# Patient Record
Sex: FEMALE | Race: WHITE | NOT HISPANIC OR LATINO | Employment: OTHER | URBAN - METROPOLITAN AREA
[De-identification: names, ages, dates, MRNs, and addresses within clinical notes are randomized per-mention and may not be internally consistent; named-entity substitution may affect disease eponyms.]

---

## 2023-03-23 ENCOUNTER — NURSING HOME VISIT (OUTPATIENT)
Dept: POST ACUTE CARE | Facility: EXTERNAL LOCATION | Age: 78
End: 2023-03-23
Payer: MEDICARE

## 2023-03-23 DIAGNOSIS — K51.919 ULCERATIVE COLITIS WITH COMPLICATION, UNSPECIFIED LOCATION (MULTI): ICD-10-CM

## 2023-03-23 DIAGNOSIS — J44.9 CHRONIC OBSTRUCTIVE PULMONARY DISEASE, UNSPECIFIED COPD TYPE (MULTI): ICD-10-CM

## 2023-03-23 DIAGNOSIS — I48.91 ATRIAL FIBRILLATION, UNSPECIFIED TYPE (MULTI): Primary | ICD-10-CM

## 2023-03-23 DIAGNOSIS — I63.9 CEREBROVASCULAR ACCIDENT (CVA), UNSPECIFIED MECHANISM (MULTI): ICD-10-CM

## 2023-03-23 PROCEDURE — 99348 HOME/RES VST EST LOW MDM 30: CPT | Performed by: EMERGENCY MEDICINE

## 2023-03-23 NOTE — LETTER
Patient: Janice Ko  : 1945    Encounter Date: 2023    Provider Impression    77-year-old     [facility]    CVA-minimal deficits. On aspirin and Plavix    Ulcerative colitis-on a steroid taper now    Hypertension-controlled with 50 mg of losartan and 5 mg of amlodipine    GERD-continue Nexium at 40 mg    Anemia she is on oral iron. Her hemoglobin has recently come up    Insomnia-continue melatonin 5 mg    Patient will follow-up with her specialist at [facility]    Provide safe environment for the patient  Continue current medication regimen  Fall prevention  OT PT and speech therapy  Monitor and treat blood pressure  Skin care and aggressive decubitus ulcer prevention.  Bowel and bladder care  Optimal nutrition and supplementation  Monitor and treat blood glucose  GI and DVT prophylaxis  Symptom control with as needed medications  Periodic lab work  Will follow       History of Present Illness    77-year-old    [facility]      Patient is unable to give detailed history and therefore history is obtained from the chart    History from hospitalization-      Patient is unable to give detailed history and therefore history is obtained from the chart    No acute complaints or concerns raised by nursing    Patient had a stroke. Decline tPA. She was admitted to [facility] neurological consultation was obtained. Patient was treated with aspirin Plavix eventually stabilized and transferred to rehab.  Patient had some blood in the stool and was eventually seen by GI and was given a high-dose prednisone taper which helped her symptoms  She was discharged to [facility] on a steroid taper    Patient  Patient is a able to give some details but has complicated past history history taken from chart includes   venous thromboembolism is   hyperlipidemia   primary hypertension   cerebral infarction recently about 2 months ago for which he was treated at [facility] and was in  rehab  COPD and   ulcerative colitis      Review of Systems  Comprehensive review of symptoms was not positive for any symptoms other than HPI       Active Problems  Problems    · Acute bacterial sinusitis (461.9) (J01.90,B96.89)   · Acute deep vein thrombosis (DVT) of calf muscle vein of left lower extremity (453.42)  (I82.462)   · A-fib (427.31) (I48.91)   · Anemia (285.9) (D64.9)   · Benign essential hypertension (401.1) (I10)   · Cat bite, initial encounter (879.8,E906.3) (W55.01XA)   · Chest pain, atypical (786.59) (R07.89)   · Chronic obstructive pulmonary disease, unspecified COPD type (496) (J44.9)   · Colitis (558.9) (K52.9)   · COVID-19 (079.89) (U07.1)   · Cystocele with rectocele (618.01,618.04) (N81.10,N81.6)   · Hematuria (599.70) (R31.9)   · Hyperlipidemia, unspecified hyperlipidemia type (272.4) (E78.5)   · Mitral regurgitation (424.0) (I34.0)   · Palpitations (785.1) (R00.2)   · Pessary maintenance (V53.99) (Z46.89)   · Right wrist pain (719.43) (M25.531)   · Seasonal allergic rhinitis (477.9) (J30.2)   · Situational anxiety (300.09) (F41.8)   · Throat pain (784.1) (R07.0)   · Tick bite (919.4,E906.4) (W57.XXXA)   · Ulcerative colitis (556.9) (K51.90)   · Urinary frequency (788.41) (R35.0)   · Vaginal bleeding (623.8) (N93.9)   · Venous insufficiency, peripheral (459.81) (I87.2)    Past Medical History  Problems    · History of chest pain (V13.89) (Z87.898)   · History of mitral valve prolapse (V12.59) (Z86.79)   · History of Varicose veins of lower extremities with inflammation (454.1) (I83.10)    Surgical History  Problems    · History of Biopsy Breast Open   · History of Cataract Surgery    Family History  Mother    · Family history of congestive heart failure (V17.49) (Z82.49)   · Family history of hypertension (V17.49) (Z82.49)   · Family history of malignant neoplasm of breast (V16.3) (Z80.3)  Father    · Family history of hypertension (V17.49) (Z82.49)   · Family history of myocardial  infarction (V17.3) (Z82.49)   · Family history of stroke-in-evolution syndrome (V17.1) (Z82.3)  Brother    · Family history of hypertension (V17.49) (Z82.49)    Social History  Problems    · Caffeine use (V49.89) (Z78.9)   · Former smoker (V15.82) (Z87.891)   · Used from 7231-7091 1/4 per day   · Occasional alcohol use   · Glass of wine or larger beer occassionally; apporx 4 per week    Allergies  Medication    · TETANUS   Allergy; Swelling; Updated By: Araseli Shin; 7/9/2018 2:45:51 PM   · EPINEPHrine Base AERS   Recorded By: Roselia Lopez; 4/28/2017 11:19:49 AM    Physical Exam    Vital signs as per nursing/MA documentation were reviewd  General appearance: Alert and in no acute distress  HEENT: Normal Inspection  Neck - Normal Inspectiopn  Respiratory : No respiratory distress.   Lungs have reduced breath sounds   Cardiovascular: S1 S2 No gallop  Back - normal inspection  Skin inspection:Warm  Musculoskeletal : No deformities  Neuro : Limited exam. Baseline  Psychiatric : Cooperative      Electronically Signed By: Carlo Oh MD   4/13/23  1:42 PM

## 2023-04-03 PROBLEM — J30.2 SEASONAL ALLERGIC RHINITIS: Status: ACTIVE | Noted: 2023-04-03

## 2023-04-03 PROBLEM — N81.6 CYSTOCELE WITH RECTOCELE: Status: ACTIVE | Noted: 2023-04-03

## 2023-04-03 PROBLEM — I48.91 A-FIB (MULTI): Status: ACTIVE | Noted: 2023-04-03

## 2023-04-03 PROBLEM — R07.89 CHEST PAIN, ATYPICAL: Status: ACTIVE | Noted: 2023-04-03

## 2023-04-03 PROBLEM — R35.0 URINARY FREQUENCY: Status: ACTIVE | Noted: 2023-04-03
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
## 2023-04-03 PROBLEM — D64.9 ANEMIA: Status: ACTIVE | Noted: 2023-04-03

## 2023-04-03 PROBLEM — I10 BENIGN ESSENTIAL HYPERTENSION: Status: ACTIVE | Noted: 2023-04-03

## 2023-04-03 PROBLEM — I82.462 ACUTE DEEP VEIN THROMBOSIS (DVT) OF CALF MUSCLE VEIN OF LEFT LOWER EXTREMITY (MULTI): Status: ACTIVE | Noted: 2023-04-03

## 2023-04-03 PROBLEM — U07.1 COVID-19: Status: ACTIVE | Noted: 2023-04-03

## 2023-04-03 PROBLEM — K52.9 COLITIS: Status: ACTIVE | Noted: 2023-04-03

## 2023-04-03 PROBLEM — K51.90 ULCERATIVE COLITIS (MULTI): Status: ACTIVE | Noted: 2023-04-03

## 2023-04-03 PROBLEM — N89.8 DISCHARGE OF VAGINA: Status: ACTIVE | Noted: 2023-04-03

## 2023-04-03 PROBLEM — B96.89 ACUTE BACTERIAL SINUSITIS: Status: ACTIVE | Noted: 2023-04-03

## 2023-04-03 PROBLEM — R00.2 PALPITATIONS: Status: ACTIVE | Noted: 2023-04-03

## 2023-04-03 PROBLEM — M25.531 RIGHT WRIST PAIN: Status: ACTIVE | Noted: 2023-04-03
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
## 2023-04-03 PROBLEM — N93.9 VAGINAL BLEEDING: Status: ACTIVE | Noted: 2023-04-03

## 2023-04-03 PROBLEM — J01.90 ACUTE BACTERIAL SINUSITIS: Status: ACTIVE | Noted: 2023-04-03

## 2023-04-03 PROBLEM — R07.0 THROAT PAIN: Status: ACTIVE | Noted: 2023-04-03

## 2023-04-03 PROBLEM — I63.9 CEREBROVASCULAR ACCIDENT (MULTI): Status: ACTIVE | Noted: 2023-04-03

## 2023-04-03 PROBLEM — J44.9 CHRONIC OBSTRUCTIVE PULMONARY DISEASE (MULTI): Status: ACTIVE | Noted: 2023-04-03

## 2023-04-03 PROBLEM — F41.8 SITUATIONAL ANXIETY: Status: ACTIVE | Noted: 2023-04-03

## 2023-04-03 PROBLEM — N81.10 CYSTOCELE WITH RECTOCELE: Status: ACTIVE | Noted: 2023-04-03

## 2023-04-03 PROBLEM — R31.9 HEMATURIA: Status: ACTIVE | Noted: 2023-04-03

## 2023-04-03 PROBLEM — I34.0 MITRAL REGURGITATION: Status: ACTIVE | Noted: 2023-04-03

## 2023-04-03 PROBLEM — E78.5 HYPERLIPIDEMIA: Status: ACTIVE | Noted: 2023-04-03

## 2023-04-03 PROBLEM — I87.2 VENOUS INSUFFICIENCY, PERIPHERAL: Status: ACTIVE | Noted: 2023-04-03

## 2023-04-03 RX ORDER — MESALAMINE 500 MG/1
1000 CAPSULE, EXTENDED RELEASE ORAL 4 TIMES DAILY
COMMUNITY
Start: 2023-03-24 | End: 2024-01-16 | Stop reason: WASHOUT

## 2023-04-03 RX ORDER — CLOPIDOGREL BISULFATE 75 MG/1
TABLET ORAL
COMMUNITY
Start: 2022-12-30 | End: 2023-04-13 | Stop reason: ALTCHOICE

## 2023-04-03 RX ORDER — AMLODIPINE BESYLATE 2.5 MG/1
1 TABLET ORAL DAILY
COMMUNITY
Start: 2020-01-23 | End: 2023-04-13 | Stop reason: SDUPTHER

## 2023-04-03 RX ORDER — ASCORBIC ACID 500 MG
1 TABLET ORAL DAILY
COMMUNITY
End: 2024-01-16 | Stop reason: WASHOUT

## 2023-04-03 RX ORDER — MULTIVITAMIN
TABLET ORAL
COMMUNITY
Start: 2023-03-02 | End: 2023-10-16 | Stop reason: SDUPTHER

## 2023-04-03 RX ORDER — CHOLECALCIFEROL (VITAMIN D3) 25 MCG
1 TABLET ORAL DAILY
COMMUNITY
End: 2023-10-16 | Stop reason: SDUPTHER

## 2023-04-03 RX ORDER — MULTIVIT-MIN/IRON FUM/FOLIC AC 7.5 MG-4
1 TABLET ORAL DAILY
COMMUNITY
End: 2023-10-16 | Stop reason: SDUPTHER

## 2023-04-03 RX ORDER — ESOMEPRAZOLE STRONTIUM 49.3 MG/1
CAPSULE, DELAYED RELEASE ORAL 2 TIMES DAILY
COMMUNITY
End: 2023-04-13 | Stop reason: ALTCHOICE

## 2023-04-03 RX ORDER — AMLODIPINE BESYLATE 5 MG/1
TABLET ORAL
COMMUNITY
Start: 2023-03-30 | End: 2023-04-13 | Stop reason: SDUPTHER

## 2023-04-03 RX ORDER — ZINC SULFATE 50(220)MG
CAPSULE ORAL
COMMUNITY
Start: 2023-03-15 | End: 2023-10-16 | Stop reason: SDUPTHER

## 2023-04-03 RX ORDER — ACETAMINOPHEN 500 MG
1 TABLET ORAL NIGHTLY
COMMUNITY
End: 2024-01-16 | Stop reason: WASHOUT

## 2023-04-03 RX ORDER — OMEPRAZOLE 20 MG/1
CAPSULE, DELAYED RELEASE ORAL
COMMUNITY
Start: 2023-03-30 | End: 2023-04-13 | Stop reason: WASHOUT

## 2023-04-03 RX ORDER — NICOTINE POLACRILEX 4 MG
1 GUM BUCCAL DAILY
COMMUNITY
End: 2024-01-16 | Stop reason: WASHOUT

## 2023-04-03 RX ORDER — FERROUS SULFATE 325(65) MG
1 TABLET ORAL EVERY OTHER DAY
COMMUNITY
End: 2024-01-16 | Stop reason: WASHOUT

## 2023-04-03 RX ORDER — LOSARTAN POTASSIUM 50 MG/1
TABLET ORAL
COMMUNITY
End: 2024-04-23 | Stop reason: SDUPTHER

## 2023-04-03 RX ORDER — ASPIRIN 81 MG/1
1 TABLET ORAL DAILY
COMMUNITY
Start: 2023-01-19 | End: 2024-02-27 | Stop reason: HOSPADM

## 2023-04-03 RX ORDER — ATORVASTATIN CALCIUM 40 MG/1
1 TABLET, FILM COATED ORAL DAILY
COMMUNITY
Start: 2022-12-30 | End: 2023-04-13 | Stop reason: ALTCHOICE

## 2023-04-03 RX ORDER — CHOLECALCIFEROL (VITAMIN D3) 50 MCG
1 TABLET ORAL DAILY
COMMUNITY
End: 2023-10-16 | Stop reason: SDUPTHER

## 2023-04-03 RX ORDER — L. ACIDOPHILUS/L.BULGARICUS 100MM CELL
GRANULES IN PACKET (EA) ORAL
COMMUNITY
End: 2024-01-16 | Stop reason: WASHOUT

## 2023-04-03 RX ORDER — CALCIUM CARBONATE/VITAMIN D3 600MG-5MCG
1 TABLET ORAL 3 TIMES DAILY
COMMUNITY
End: 2023-10-16 | Stop reason: SDUPTHER

## 2023-04-03 RX ORDER — NAPROXEN SODIUM 220 MG/1
1 TABLET, FILM COATED ORAL DAILY
COMMUNITY
Start: 2022-12-30 | End: 2023-04-13 | Stop reason: SDUPTHER

## 2023-04-03 RX ORDER — MESALAMINE 0.38 G/1
CAPSULE, EXTENDED RELEASE ORAL
COMMUNITY
Start: 2023-03-16 | End: 2023-10-16 | Stop reason: SDUPTHER

## 2023-04-03 RX ORDER — LOSARTAN POTASSIUM 25 MG/1
TABLET ORAL
COMMUNITY
End: 2023-04-13 | Stop reason: SDUPTHER

## 2023-04-03 RX ORDER — AMLODIPINE BESYLATE 10 MG/1
0.5 TABLET ORAL DAILY
COMMUNITY
End: 2024-01-16 | Stop reason: WASHOUT

## 2023-04-03 RX ORDER — FAMOTIDINE 20 MG/1
TABLET, FILM COATED ORAL
COMMUNITY
Start: 2023-03-16 | End: 2023-07-13 | Stop reason: ALTCHOICE

## 2023-04-03 RX ORDER — EPINEPHRINE 0.22MG
AEROSOL WITH ADAPTER (ML) INHALATION
COMMUNITY
End: 2024-01-16 | Stop reason: WASHOUT

## 2023-04-03 RX ORDER — ESOMEPRAZOLE MAGNESIUM 40 MG/1
1 CAPSULE, DELAYED RELEASE ORAL DAILY PRN
COMMUNITY
End: 2023-04-13 | Stop reason: ALTCHOICE

## 2023-04-03 RX ORDER — ACETAMINOPHEN 160 MG/5ML
SUSPENSION, ORAL (FINAL DOSE FORM) ORAL
COMMUNITY
End: 2023-10-16 | Stop reason: SDUPTHER

## 2023-04-03 RX ORDER — ACETAMINOPHEN 500 MG
1 TABLET ORAL DAILY
COMMUNITY
End: 2023-10-16 | Stop reason: SDUPTHER

## 2023-04-13 ENCOUNTER — OFFICE VISIT (OUTPATIENT)
Dept: PRIMARY CARE | Facility: CLINIC | Age: 78
End: 2023-04-13
Payer: MEDICARE

## 2023-04-13 VITALS
DIASTOLIC BLOOD PRESSURE: 62 MMHG | BODY MASS INDEX: 19.08 KG/M2 | SYSTOLIC BLOOD PRESSURE: 126 MMHG | HEART RATE: 97 BPM | HEIGHT: 60 IN | TEMPERATURE: 97.4 F | OXYGEN SATURATION: 97 % | WEIGHT: 97.2 LBS

## 2023-04-13 DIAGNOSIS — I10 BENIGN ESSENTIAL HYPERTENSION: ICD-10-CM

## 2023-04-13 DIAGNOSIS — I63.139 CEREBROVASCULAR ACCIDENT (CVA) DUE TO EMBOLISM OF CAROTID ARTERY, UNSPECIFIED BLOOD VESSEL LATERALITY (MULTI): Primary | ICD-10-CM

## 2023-04-13 DIAGNOSIS — K51.919 ULCERATIVE COLITIS WITH COMPLICATION, UNSPECIFIED LOCATION (MULTI): ICD-10-CM

## 2023-04-13 DIAGNOSIS — G81.94 LEFT HEMIPARESIS (MULTI): ICD-10-CM

## 2023-04-13 DIAGNOSIS — E78.5 HYPERLIPIDEMIA, UNSPECIFIED HYPERLIPIDEMIA TYPE: ICD-10-CM

## 2023-04-13 PROBLEM — I48.91 A-FIB (MULTI): Status: RESOLVED | Noted: 2023-04-03 | Resolved: 2023-04-13

## 2023-04-13 PROCEDURE — 1159F MED LIST DOCD IN RCRD: CPT | Performed by: FAMILY MEDICINE

## 2023-04-13 PROCEDURE — 3074F SYST BP LT 130 MM HG: CPT | Performed by: FAMILY MEDICINE

## 2023-04-13 PROCEDURE — 1036F TOBACCO NON-USER: CPT | Performed by: FAMILY MEDICINE

## 2023-04-13 PROCEDURE — 99214 OFFICE O/P EST MOD 30 MIN: CPT | Performed by: FAMILY MEDICINE

## 2023-04-13 PROCEDURE — 3078F DIAST BP <80 MM HG: CPT | Performed by: FAMILY MEDICINE

## 2023-04-13 RX ORDER — VALSARTAN AND HYDROCHLOROTHIAZIDE 160; 12.5 MG/1; MG/1
TABLET, FILM COATED ORAL
COMMUNITY
Start: 2014-04-29 | End: 2023-04-13 | Stop reason: ALTCHOICE

## 2023-04-13 RX ORDER — CHOLECALCIFEROL (VITAMIN D3) 25 MCG
1000 TABLET ORAL
COMMUNITY
Start: 2023-02-17 | End: 2024-01-16 | Stop reason: WASHOUT

## 2023-04-13 RX ORDER — ASCORBIC ACID 500 MG
500 TABLET,CHEWABLE ORAL
COMMUNITY
Start: 2023-02-17 | End: 2023-10-16 | Stop reason: SDUPTHER

## 2023-04-13 RX ORDER — ROSUVASTATIN CALCIUM 20 MG/1
1 TABLET, COATED ORAL DAILY
COMMUNITY
Start: 2023-04-10 | End: 2024-01-16 | Stop reason: WASHOUT

## 2023-04-13 SDOH — ECONOMIC STABILITY: INCOME INSECURITY: IN THE LAST 12 MONTHS, WAS THERE A TIME WHEN YOU WERE NOT ABLE TO PAY THE MORTGAGE OR RENT ON TIME?: NO

## 2023-04-13 SDOH — ECONOMIC STABILITY: TRANSPORTATION INSECURITY
IN THE PAST 12 MONTHS, HAS LACK OF TRANSPORTATION KEPT YOU FROM MEETINGS, WORK, OR FROM GETTING THINGS NEEDED FOR DAILY LIVING?: NO

## 2023-04-13 SDOH — ECONOMIC STABILITY: FOOD INSECURITY: WITHIN THE PAST 12 MONTHS, THE FOOD YOU BOUGHT JUST DIDN'T LAST AND YOU DIDN'T HAVE MONEY TO GET MORE.: NEVER TRUE

## 2023-04-13 SDOH — ECONOMIC STABILITY: TRANSPORTATION INSECURITY
IN THE PAST 12 MONTHS, HAS THE LACK OF TRANSPORTATION KEPT YOU FROM MEDICAL APPOINTMENTS OR FROM GETTING MEDICATIONS?: NO

## 2023-04-13 SDOH — ECONOMIC STABILITY: FOOD INSECURITY: WITHIN THE PAST 12 MONTHS, YOU WORRIED THAT YOUR FOOD WOULD RUN OUT BEFORE YOU GOT MONEY TO BUY MORE.: NEVER TRUE

## 2023-04-13 SDOH — ECONOMIC STABILITY: HOUSING INSECURITY
IN THE LAST 12 MONTHS, WAS THERE A TIME WHEN YOU DID NOT HAVE A STEADY PLACE TO SLEEP OR SLEPT IN A SHELTER (INCLUDING NOW)?: NO

## 2023-04-13 ASSESSMENT — SOCIAL DETERMINANTS OF HEALTH (SDOH)
HOW HARD IS IT FOR YOU TO PAY FOR THE VERY BASICS LIKE FOOD, HOUSING, MEDICAL CARE, AND HEATING?: NOT HARD AT ALL
WITHIN THE LAST YEAR, HAVE TO BEEN RAPED OR FORCED TO HAVE ANY KIND OF SEXUAL ACTIVITY BY YOUR PARTNER OR EX-PARTNER?: NO
WITHIN THE LAST YEAR, HAVE YOU BEEN KICKED, HIT, SLAPPED, OR OTHERWISE PHYSICALLY HURT BY YOUR PARTNER OR EX-PARTNER?: NO
WITHIN THE LAST YEAR, HAVE YOU BEEN HUMILIATED OR EMOTIONALLY ABUSED IN OTHER WAYS BY YOUR PARTNER OR EX-PARTNER?: NO
WITHIN THE LAST YEAR, HAVE YOU BEEN AFRAID OF YOUR PARTNER OR EX-PARTNER?: NO

## 2023-04-13 ASSESSMENT — ENCOUNTER SYMPTOMS
PSYCHIATRIC NEGATIVE: 1
LOSS OF SENSATION IN FEET: 0
OCCASIONAL FEELINGS OF UNSTEADINESS: 0
FATIGUE: 1
DEPRESSION: 0

## 2023-04-13 ASSESSMENT — LIFESTYLE VARIABLES
AUDIT-C TOTAL SCORE: 0
HOW OFTEN DO YOU HAVE A DRINK CONTAINING ALCOHOL: NEVER
HOW OFTEN DO YOU HAVE SIX OR MORE DRINKS ON ONE OCCASION: NEVER
SKIP TO QUESTIONS 9-10: 1
HOW MANY STANDARD DRINKS CONTAINING ALCOHOL DO YOU HAVE ON A TYPICAL DAY: PATIENT DOES NOT DRINK

## 2023-04-13 ASSESSMENT — PATIENT HEALTH QUESTIONNAIRE - PHQ9
SUM OF ALL RESPONSES TO PHQ9 QUESTIONS 1 & 2: 0
2. FEELING DOWN, DEPRESSED OR HOPELESS: NOT AT ALL
1. LITTLE INTEREST OR PLEASURE IN DOING THINGS: NOT AT ALL

## 2023-04-13 ASSESSMENT — PAIN SCALES - GENERAL: PAINLEVEL: 0-NO PAIN

## 2023-04-13 NOTE — PROGRESS NOTES
Subjective   Patient ID: Janice Ko is a 77 y.o. female who presents for Follow-up ([facility]- stroke ).    HPI     Review of Systems   Constitutional:  Positive for fatigue.   Cardiovascular:         Dr Perez cardiac   Gastrointestinal:         History of UC she is getting an iron transfusion   Neurological:         CVA 12/2022 [facility] left hemiparesis   Hematological:         Anemia   Psychiatric/Behavioral: Negative.         Objective   /62 (BP Location: Left arm)   Pulse 97   Temp 36.3 °C (97.4 °F) (Temporal)   Ht 1.524 m (5')   Wt (!) 44.1 kg (97 lb 3.2 oz)   SpO2 97%   BMI 18.98 kg/m²     Physical Exam  Vitals and nursing note reviewed.   Cardiovascular:      Rate and Rhythm: Normal rate and regular rhythm.      Pulses: Normal pulses.      Heart sounds: Normal heart sounds.   Pulmonary:      Breath sounds: Normal breath sounds.   Abdominal:      Palpations: Abdomen is soft.   Neurological:      Mental Status: She is alert.      Comments: Left hemiparesis with an AFO brace   Psychiatric:         Mood and Affect: Mood normal.         Assessment/Plan   Problem List Items Addressed This Visit          Nervous    Cerebrovascular accident (CMS/HCC) - Primary       Circulatory    Benign essential hypertension       Digestive    Ulcerative colitis (CMS/HCC)       Other    Hyperlipidemia     Other Visit Diagnoses       Left hemiparesis (CMS/HCC)

## 2023-04-13 NOTE — PROGRESS NOTES
Provider Impression    77-year-old     [facility]    CVA-minimal deficits. On aspirin and Plavix    Ulcerative colitis-on a steroid taper now    Hypertension-controlled with 50 mg of losartan and 5 mg of amlodipine    GERD-continue Nexium at 40 mg    Anemia she is on oral iron. Her hemoglobin has recently come up    Insomnia-continue melatonin 5 mg    Patient will follow-up with her specialist at [facility]    Provide safe environment for the patient  Continue current medication regimen  Fall prevention  OT PT and speech therapy  Monitor and treat blood pressure  Skin care and aggressive decubitus ulcer prevention.  Bowel and bladder care  Optimal nutrition and supplementation  Monitor and treat blood glucose  GI and DVT prophylaxis  Symptom control with as needed medications  Periodic lab work  Will follow       History of Present Illness    77-year-old    [facility]      Patient is unable to give detailed history and therefore history is obtained from the chart    History from hospitalization-      Patient is unable to give detailed history and therefore history is obtained from the chart    No acute complaints or concerns raised by nursing    Patient had a stroke. Decline tPA. She was admitted to [facility] neurological consultation was obtained. Patient was treated with aspirin Plavix eventually stabilized and transferred to rehab.  Patient had some blood in the stool and was eventually seen by GI and was given a high-dose prednisone taper which helped her symptoms  She was discharged to [facility] on a steroid taper    Patient  Patient is a able to give some details but has complicated past history history taken from chart includes   venous thromboembolism is   hyperlipidemia   primary hypertension   cerebral infarction recently about 2 months ago for which he was treated at [facility] and was in rehab  COPD and   ulcerative colitis      Review of Systems  Comprehensive  review of symptoms was not positive for any symptoms other than HPI       Active Problems  Problems    · Acute bacterial sinusitis (461.9) (J01.90,B96.89)   · Acute deep vein thrombosis (DVT) of calf muscle vein of left lower extremity (453.42)  (I82.462)   · A-fib (427.31) (I48.91)   · Anemia (285.9) (D64.9)   · Benign essential hypertension (401.1) (I10)   · Cat bite, initial encounter (879.8,E906.3) (W55.01XA)   · Chest pain, atypical (786.59) (R07.89)   · Chronic obstructive pulmonary disease, unspecified COPD type (496) (J44.9)   · Colitis (558.9) (K52.9)   · COVID-19 (079.89) (U07.1)   · Cystocele with rectocele (618.01,618.04) (N81.10,N81.6)   · Hematuria (599.70) (R31.9)   · Hyperlipidemia, unspecified hyperlipidemia type (272.4) (E78.5)   · Mitral regurgitation (424.0) (I34.0)   · Palpitations (785.1) (R00.2)   · Pessary maintenance (V53.99) (Z46.89)   · Right wrist pain (719.43) (M25.531)   · Seasonal allergic rhinitis (477.9) (J30.2)   · Situational anxiety (300.09) (F41.8)   · Throat pain (784.1) (R07.0)   · Tick bite (919.4,E906.4) (W57.XXXA)   · Ulcerative colitis (556.9) (K51.90)   · Urinary frequency (788.41) (R35.0)   · Vaginal bleeding (623.8) (N93.9)   · Venous insufficiency, peripheral (459.81) (I87.2)    Past Medical History  Problems    · History of chest pain (V13.89) (Z87.898)   · History of mitral valve prolapse (V12.59) (Z86.79)   · History of Varicose veins of lower extremities with inflammation (454.1) (I83.10)    Surgical History  Problems    · History of Biopsy Breast Open   · History of Cataract Surgery    Family History  Mother    · Family history of congestive heart failure (V17.49) (Z82.49)   · Family history of hypertension (V17.49) (Z82.49)   · Family history of malignant neoplasm of breast (V16.3) (Z80.3)  Father    · Family history of hypertension (V17.49) (Z82.49)   · Family history of myocardial infarction (V17.3) (Z82.49)   · Family history of stroke-in-evolution syndrome  (V17.1) (Z82.3)  Brother    · Family history of hypertension (V17.49) (Z82.49)    Social History  Problems    · Caffeine use (V49.89) (Z78.9)   · Former smoker (V15.82) (Z87.891)   · Used from 9783-9391 1/4 per day   · Occasional alcohol use   · Glass of wine or larger beer occassionally; apporx 4 per week    Allergies  Medication    · TETANUS   Allergy; Swelling; Updated By: Araseli Shin; 7/9/2018 2:45:51 PM   · EPINEPHrine Base AERS   Recorded By: Roselia Lopez; 4/28/2017 11:19:49 AM    Physical Exam    Vital signs as per nursing/MA documentation were reviewd  General appearance: Alert and in no acute distress  HEENT: Normal Inspection  Neck - Normal Inspectiopn  Respiratory : No respiratory distress.   Lungs have reduced breath sounds   Cardiovascular: S1 S2 No gallop  Back - normal inspection  Skin inspection:Warm  Musculoskeletal : No deformities  Neuro : Limited exam. Baseline  Psychiatric : Cooperative

## 2023-04-20 ENCOUNTER — HOME VISIT (OUTPATIENT)
Dept: POST ACUTE CARE | Facility: EXTERNAL LOCATION | Age: 78
End: 2023-04-20
Payer: MEDICARE

## 2023-04-20 DIAGNOSIS — I82.462 ACUTE DEEP VEIN THROMBOSIS (DVT) OF CALF MUSCLE VEIN OF LEFT LOWER EXTREMITY (MULTI): Primary | ICD-10-CM

## 2023-04-20 DIAGNOSIS — K51.919 ULCERATIVE COLITIS WITH COMPLICATION, UNSPECIFIED LOCATION (MULTI): ICD-10-CM

## 2023-04-20 DIAGNOSIS — J44.9 CHRONIC OBSTRUCTIVE PULMONARY DISEASE, UNSPECIFIED COPD TYPE (MULTI): ICD-10-CM

## 2023-04-20 PROCEDURE — 99349 HOME/RES VST EST MOD MDM 40: CPT | Performed by: EMERGENCY MEDICINE

## 2023-04-25 NOTE — PROGRESS NOTES
Provider Impression    77-year-old     [facility]    CVA-minimal deficits. On aspirin and Plavix    Ulcerative colitis-on a steroid taper now    Hypertension-controlled with 50 mg of losartan and 5 mg of amlodipine    GERD-continue Nexium at 40 mg    Anemia she is on oral iron. Her hemoglobin has recently come up    Insomnia-continue melatonin 5 mg    Patient will follow-up with her specialist at [facility]    -Fall prevention    -Cognitive engagement     -Monitor and treat blood pressure     -Aggressive decubitus ulcer prevention.     -Bowel and bladder care     -Optimal nutrition and supplementation as needed     -GI  and DVT prophylaxis     -Symptom control     -Ambulation as tolerated     -Will follow         History of Present Illness    77-year-old    [facility]      Patient is unable to give detailed history and therefore history is obtained from the chart    History from hospitalization-      Patient is unable to give detailed history and therefore history is obtained from the chart    No acute complaints or concerns raised by nursing    Patient had a stroke. Decline tPA. She was admitted to [facility] neurological consultation was obtained. Patient was treated with aspirin Plavix eventually stabilized and transferred to rehab.  Patient had some blood in the stool and was eventually seen by GI and was given a high-dose prednisone taper which helped her symptoms  She was discharged to [facility] assisted living on a steroid taper    Patient  Patient is a able to give some details but has complicated past history history taken from chart includes   venous thromboembolism is   hyperlipidemia   primary hypertension   cerebral infarction recently about 2 months ago for which he was treated at [facility] and was in rehab  COPD and   ulcerative colitis      Review of Systems  Comprehensive review of symptoms was not positive for any symptoms other than HPI       Active Problems  Problems     · Acute bacterial sinusitis (461.9) (J01.90,B96.89)   · Acute deep vein thrombosis (DVT) of calf muscle vein of left lower extremity (453.42)  (I82.462)   · A-fib (427.31) (I48.91)   · Anemia (285.9) (D64.9)   · Benign essential hypertension (401.1) (I10)   · Cat bite, initial encounter (879.8,E906.3) (W55.01XA)   · Chest pain, atypical (786.59) (R07.89)   · Chronic obstructive pulmonary disease, unspecified COPD type (496) (J44.9)   · Colitis (558.9) (K52.9)   · COVID-19 (079.89) (U07.1)   · Cystocele with rectocele (618.01,618.04) (N81.10,N81.6)   · Hematuria (599.70) (R31.9)   · Hyperlipidemia, unspecified hyperlipidemia type (272.4) (E78.5)   · Mitral regurgitation (424.0) (I34.0)   · Palpitations (785.1) (R00.2)   · Pessary maintenance (V53.99) (Z46.89)   · Right wrist pain (719.43) (M25.531)   · Seasonal allergic rhinitis (477.9) (J30.2)   · Situational anxiety (300.09) (F41.8)   · Throat pain (784.1) (R07.0)   · Tick bite (919.4,E906.4) (W57.XXXA)   · Ulcerative colitis (556.9) (K51.90)   · Urinary frequency (788.41) (R35.0)   · Vaginal bleeding (623.8) (N93.9)   · Venous insufficiency, peripheral (459.81) (I87.2)    Past Medical History  Problems    · History of chest pain (V13.89) (Z87.898)   · History of mitral valve prolapse (V12.59) (Z86.79)   · History of Varicose veins of lower extremities with inflammation (454.1) (I83.10)    Surgical History  Problems    · History of Biopsy Breast Open   · History of Cataract Surgery    Family History  Mother    · Family history of congestive heart failure (V17.49) (Z82.49)   · Family history of hypertension (V17.49) (Z82.49)   · Family history of malignant neoplasm of breast (V16.3) (Z80.3)  Father    · Family history of hypertension (V17.49) (Z82.49)   · Family history of myocardial infarction (V17.3) (Z82.49)   · Family history of stroke-in-evolution syndrome (V17.1) (Z82.3)  Brother    · Family history of hypertension (V17.49) (Z82.49)    Social History  Problems     · Caffeine use (V49.89) (Z78.9)   · Former smoker (V15.82) (Z87.891)   · Used from 7431-6185 1/4 per day   · Occasional alcohol use   · Glass of wine or larger beer occassionally; apporx 4 per week    Allergies  Medication    · TETANUS   Allergy; Swelling; Updated By: Araseli Shin; 7/9/2018 2:45:51 PM   · EPINEPHrine Base AERS   Recorded By: Roselia Lopez; 4/28/2017 11:19:49 AM    Physical Exam    Vital signs as per nursing/MA documentation were reviewd  General appearance: Alert and in no acute distress  HEENT: Normal Inspection  Neck - Normal Inspectiopn  Respiratory : No respiratory distress.   Lungs have reduced breath sounds   Cardiovascular: S1 S2 No gallop  Back - normal inspection  Skin inspection:Warm  Musculoskeletal : No deformities  Neuro : Limited exam. Baseline  Psychiatric : Cooperative

## 2023-04-26 ENCOUNTER — TELEPHONE (OUTPATIENT)
Dept: PRIMARY CARE | Facility: CLINIC | Age: 78
End: 2023-04-26
Payer: MEDICARE

## 2023-04-26 DIAGNOSIS — K51.919 ULCERATIVE COLITIS WITH COMPLICATION, UNSPECIFIED LOCATION (MULTI): Primary | ICD-10-CM

## 2023-04-26 NOTE — TELEPHONE ENCOUNTER
Pt called and would like to know if you could order a CBC due to getting an injection done in may. Pt wants to see what her levels are. Pt states that her cardio doctor cut here crestor down and she wants to know if she is making the right decision.

## 2023-05-18 ENCOUNTER — HOME VISIT (OUTPATIENT)
Dept: POST ACUTE CARE | Facility: EXTERNAL LOCATION | Age: 78
End: 2023-05-18
Payer: MEDICARE

## 2023-05-18 DIAGNOSIS — I82.462 ACUTE DEEP VEIN THROMBOSIS (DVT) OF CALF MUSCLE VEIN OF LEFT LOWER EXTREMITY (MULTI): ICD-10-CM

## 2023-05-18 DIAGNOSIS — J44.9 CHRONIC OBSTRUCTIVE PULMONARY DISEASE, UNSPECIFIED COPD TYPE (MULTI): Primary | ICD-10-CM

## 2023-05-18 DIAGNOSIS — I63.139 CEREBROVASCULAR ACCIDENT (CVA) DUE TO EMBOLISM OF CAROTID ARTERY, UNSPECIFIED BLOOD VESSEL LATERALITY (MULTI): ICD-10-CM

## 2023-05-18 DIAGNOSIS — K51.919 ULCERATIVE COLITIS WITH COMPLICATION, UNSPECIFIED LOCATION (MULTI): ICD-10-CM

## 2023-05-18 PROCEDURE — 99349 HOME/RES VST EST MOD MDM 40: CPT | Performed by: EMERGENCY MEDICINE

## 2023-05-25 NOTE — PROGRESS NOTES
Provider Impression    77-year-old     [facility]    CVA-minimal deficits. On aspirin and Plavix    Ulcerative colitis-on a steroid taper now    Hypertension-controlled with 50 mg of losartan and 5 mg of amlodipine    GERD-continue Nexium at 40 mg    Anemia she is on oral iron. Her hemoglobin has recently come up    Insomnia-continue melatonin 5 mg    Patient will follow-up with her specialist at [facility]    Rx list reviewed.   PT and OT evaluation is in the process.   Routine safety measures, fall precautions, risk modification and alarm placement if needed for prevention of falls.   Skin care precautions, prevention of pressures sores at pressure points assessed.   Pt needs to be monitored frequently by nursing staff particularly at night time.   Any confusion, agitation or behavioural disturbance needs to be attended, as per home policy   rapid covid Ag assay need to be done, notify if positive.   If needed appropriate measures to be taken for alarm placements and assisted devices, pt was told not to get up and ambulate at night unless help and assist available at bedside,   labs will be done as per our routine protocol.   PO intake need to be monitored if consuming po.       Charting is done using voice recognition software and may contain errors which have not been completely corrected           History of Present Illness    77-year-old    [facility]      Patient is unable to give detailed history and therefore history is obtained from the chart    History from hospitalization-      Patient is unable to give detailed history and therefore history is obtained from the chart    No acute complaints or concerns raised by nursing    Patient had a stroke. Decline tPA. She was admitted to [facility] neurological consultation was obtained. Patient was treated with aspirin Plavix eventually stabilized and transferred to rehab.  Patient had some blood in the stool and was eventually  seen by GI and was given a high-dose prednisone taper which helped her symptoms  She was discharged to [facility] assisted living on a steroid taper    Patient  Patient is a able to give some details but has complicated past history history taken from chart includes   venous thromboembolism is   hyperlipidemia   primary hypertension   cerebral infarction recently about 2 months ago for which he was treated at [facility] and was in rehab  COPD and   ulcerative colitis      Review of Systems  Comprehensive review of symptoms was not positive for any symptoms other than HPI       Active Problems  Problems    · Acute bacterial sinusitis (461.9) (J01.90,B96.89)   · Acute deep vein thrombosis (DVT) of calf muscle vein of left lower extremity (453.42)  (I82.462)   · A-fib (427.31) (I48.91)   · Anemia (285.9) (D64.9)   · Benign essential hypertension (401.1) (I10)   · Cat bite, initial encounter (879.8,E906.3) (W55.01XA)   · Chest pain, atypical (786.59) (R07.89)   · Chronic obstructive pulmonary disease, unspecified COPD type (496) (J44.9)   · Colitis (558.9) (K52.9)   · COVID-19 (079.89) (U07.1)   · Cystocele with rectocele (618.01,618.04) (N81.10,N81.6)   · Hematuria (599.70) (R31.9)   · Hyperlipidemia, unspecified hyperlipidemia type (272.4) (E78.5)   · Mitral regurgitation (424.0) (I34.0)   · Palpitations (785.1) (R00.2)   · Pessary maintenance (V53.99) (Z46.89)   · Right wrist pain (719.43) (M25.531)   · Seasonal allergic rhinitis (477.9) (J30.2)   · Situational anxiety (300.09) (F41.8)   · Throat pain (784.1) (R07.0)   · Tick bite (919.4,E906.4) (W57.XXXA)   · Ulcerative colitis (556.9) (K51.90)   · Urinary frequency (788.41) (R35.0)   · Vaginal bleeding (623.8) (N93.9)   · Venous insufficiency, peripheral (459.81) (I87.2)    Past Medical History  Problems    · History of chest pain (V13.89) (Z87.898)   · History of mitral valve prolapse (V12.59) (Z86.79)   · History of Varicose veins of lower extremities with inflammation  (454.1) (I83.10)    Surgical History  Problems    · History of Biopsy Breast Open   · History of Cataract Surgery    Family History  Mother    · Family history of congestive heart failure (V17.49) (Z82.49)   · Family history of hypertension (V17.49) (Z82.49)   · Family history of malignant neoplasm of breast (V16.3) (Z80.3)  Father    · Family history of hypertension (V17.49) (Z82.49)   · Family history of myocardial infarction (V17.3) (Z82.49)   · Family history of stroke-in-evolution syndrome (V17.1) (Z82.3)  Brother    · Family history of hypertension (V17.49) (Z82.49)    Social History  Problems    · Caffeine use (V49.89) (Z78.9)   · Former smoker (V15.82) (Z87.891)   · Used from 7565-8198 1/4 per day   · Occasional alcohol use   · Glass of wine or larger beer occassionally; apporx 4 per week    Allergies  Medication    · TETANUS   Allergy; Swelling; Updated By: Araseli Shin; 7/9/2018 2:45:51 PM   · EPINEPHrine Base AERS   Recorded By: Roselia Lopez; 4/28/2017 11:19:49 AM    Physical Exam    Vital signs as per nursing/MA documentation were reviewd  General appearance: Alert and in no acute distress  HEENT: Normal Inspection  Neck - Normal Inspectiopn  Respiratory : No respiratory distress.   Lungs have reduced breath sounds   Cardiovascular: S1 S2 No gallop  Back - normal inspection  Skin inspection:Warm  Musculoskeletal : No deformities  Neuro : Limited exam. Baseline  Psychiatric : Cooperative

## 2023-06-27 ENCOUNTER — HOME VISIT (OUTPATIENT)
Dept: POST ACUTE CARE | Facility: EXTERNAL LOCATION | Age: 78
End: 2023-06-27
Payer: MEDICARE

## 2023-06-27 DIAGNOSIS — I63.139 CEREBROVASCULAR ACCIDENT (CVA) DUE TO EMBOLISM OF CAROTID ARTERY, UNSPECIFIED BLOOD VESSEL LATERALITY (MULTI): ICD-10-CM

## 2023-06-27 DIAGNOSIS — I82.462 ACUTE DEEP VEIN THROMBOSIS (DVT) OF CALF MUSCLE VEIN OF LEFT LOWER EXTREMITY (MULTI): ICD-10-CM

## 2023-06-27 DIAGNOSIS — J44.9 CHRONIC OBSTRUCTIVE PULMONARY DISEASE, UNSPECIFIED COPD TYPE (MULTI): ICD-10-CM

## 2023-06-27 DIAGNOSIS — K51.919 ULCERATIVE COLITIS WITH COMPLICATION, UNSPECIFIED LOCATION (MULTI): Primary | ICD-10-CM

## 2023-06-27 PROCEDURE — 99348 HOME/RES VST EST LOW MDM 30: CPT | Performed by: EMERGENCY MEDICINE

## 2023-07-01 NOTE — PROGRESS NOTES
Provider Impression      [facility]    CVA-minimal deficits. On aspirin and Plavix    Ulcerative colitis-on a steroid taper now    Hypertension-controlled with 50 mg of losartan and 5 mg of amlodipine    GERD-continue Nexium at 40 mg    Anemia she is on oral iron. Her hemoglobin has recently come up    Insomnia-continue melatonin 5 mg    Patient will follow-up with her specialist at [facility]    1. medications are reviewed      2. Continue with rehabilitative, supportive, and or restorative care as ordered and as the patient tolerates     3. Laboratory evaluations will be monitored on an ongoing as needed basis     4. Medications have been cross-referenced with the patient's diagnoses list, and medications reductions have been considered and/or implemented.     5. Pharmacy recommendations are addressed on an ongoing as needed basis.     6. Controlled substances have been electronically scripted every 60 days for opiates and others of similar schedule, and every 6 months for sedative/hypnotics and others of similar schedule.     7. Nursing has been queried about any potential adverse events that need to be reported to me.    Salient information and adjustment of care plan pertaining to this individual patient interaction today are the following:      A. We will continue with restorative and supportive care as the patient tolerates    B. Laboratory examinations will continue to be drawn on an ongoing as-needed basis. The patient's weight needs to be monitored and if needed we may need to institute appetite stimulating medication    C. The patient's long term prognosis is guarded    Charting is done using voice recognition software and may contain errors which may not have been completely corrected             History of Present Illness      [facility]      Patient is unable to give detailed history and therefore history is obtained from the chart    History from  hospitalization-      Patient is unable to give detailed history and therefore history is obtained from the chart    No acute complaints or concerns raised by nursing    Patient had a stroke. Decline tPA. She was admitted to [facility] neurological consultation was obtained. Patient was treated with aspirin Plavix eventually stabilized and transferred to rehab.  Patient had some blood in the stool and was eventually seen by GI and was given a high-dose prednisone taper which helped her symptoms  She was discharged to [facility] assisted living on a steroid taper    Patient  Patient is a able to give some details but has complicated past history history taken from chart includes   venous thromboembolism is   hyperlipidemia   primary hypertension   cerebral infarction recently about 2 months ago for which he was treated at [facility] and was in rehab  COPD and   ulcerative colitis      Review of Systems  Comprehensive review of symptoms was not positive for any symptoms other than HPI       Active Problems  Problems    · Acute bacterial sinusitis (461.9) (J01.90,B96.89)   · Acute deep vein thrombosis (DVT) of calf muscle vein of left lower extremity (453.42)  (I82.462)   · A-fib (427.31) (I48.91)   · Anemia (285.9) (D64.9)   · Benign essential hypertension (401.1) (I10)   · Cat bite, initial encounter (879.8,E906.3) (W55.01XA)   · Chest pain, atypical (786.59) (R07.89)   · Chronic obstructive pulmonary disease, unspecified COPD type (496) (J44.9)   · Colitis (558.9) (K52.9)   · COVID-19 (079.89) (U07.1)   · Cystocele with rectocele (618.01,618.04) (N81.10,N81.6)   · Hematuria (599.70) (R31.9)   · Hyperlipidemia, unspecified hyperlipidemia type (272.4) (E78.5)   · Mitral regurgitation (424.0) (I34.0)   · Palpitations (785.1) (R00.2)   · Pessary maintenance (V53.99) (Z46.89)   · Right wrist pain (719.43) (M25.531)   · Seasonal allergic rhinitis (477.9) (J30.2)   · Situational anxiety (300.09) (F41.8)   · Throat pain (784.1)  (R07.0)   · Tick bite (919.4,E906.4) (W57.XXXA)   · Ulcerative colitis (556.9) (K51.90)   · Urinary frequency (788.41) (R35.0)   · Vaginal bleeding (623.8) (N93.9)   · Venous insufficiency, peripheral (459.81) (I87.2)    Past Medical History  Problems    · History of chest pain (V13.89) (Z87.898)   · History of mitral valve prolapse (V12.59) (Z86.79)   · History of Varicose veins of lower extremities with inflammation (454.1) (I83.10)    Surgical History  Problems    · History of Biopsy Breast Open   · History of Cataract Surgery    Family History  Mother    · Family history of congestive heart failure (V17.49) (Z82.49)   · Family history of hypertension (V17.49) (Z82.49)   · Family history of malignant neoplasm of breast (V16.3) (Z80.3)  Father    · Family history of hypertension (V17.49) (Z82.49)   · Family history of myocardial infarction (V17.3) (Z82.49)   · Family history of stroke-in-evolution syndrome (V17.1) (Z82.3)  Brother    · Family history of hypertension (V17.49) (Z82.49)    Social History  Problems    · Caffeine use (V49.89) (Z78.9)   · Former smoker (V15.82) (Z87.891)   · Used from 5875-4174 1/4 per day   · Occasional alcohol use   · Glass of wine or larger beer occassionally; apporx 4 per week    Allergies  Medication    · TETANUS   Allergy; Swelling; Updated By: Araseli Shin; 7/9/2018 2:45:51 PM   · EPINEPHrine Base AERS   Recorded By: Roselia Lopez; 4/28/2017 11:19:49 AM    Physical Exam    Vital signs as per nursing/MA documentation were reviewd  General appearance: Alert and in no acute distress  HEENT: Normal Inspection  Neck - Normal Inspectiopn  Respiratory : No respiratory distress.   Lungs have reduced breath sounds   Cardiovascular: S1 S2 No gallop  Back - normal inspection  Skin inspection:Warm  Musculoskeletal : No deformities  Neuro : Limited exam. Baseline  Psychiatric : Cooperative

## 2023-07-11 PROBLEM — M19.90 ARTHRITIS: Status: ACTIVE | Noted: 2022-09-22

## 2023-07-11 PROBLEM — L65.9 HAIR LOSS: Status: ACTIVE | Noted: 2018-10-09

## 2023-07-11 PROBLEM — R26.2 DIFFICULTY WALKING: Status: ACTIVE | Noted: 2023-07-11

## 2023-07-11 PROBLEM — R10.32 LLQ PAIN: Status: ACTIVE | Noted: 2020-09-17

## 2023-07-11 PROBLEM — R39.11 URINARY HESITANCY: Status: ACTIVE | Noted: 2020-10-09

## 2023-07-11 PROBLEM — Z86.2 HISTORY OF ANEMIA: Status: ACTIVE | Noted: 2023-07-11

## 2023-07-11 PROBLEM — I69.359 HEMIPARESIS AS LATE EFFECT OF CEREBROVASCULAR ACCIDENT (CVA) (MULTI): Status: ACTIVE | Noted: 2023-03-01

## 2023-07-11 PROBLEM — E55.9 VITAMIN D DEFICIENCY: Status: ACTIVE | Noted: 2023-07-11

## 2023-07-11 PROBLEM — K21.9 GASTROESOPHAGEAL REFLUX DISEASE: Status: ACTIVE | Noted: 2023-03-16

## 2023-07-11 PROBLEM — I10 HYPERTENSION: Status: ACTIVE | Noted: 2022-09-22

## 2023-07-11 PROBLEM — R94.31 ELECTROCARDIOGRAM ABNORMAL: Status: ACTIVE | Noted: 2023-07-11

## 2023-07-11 PROBLEM — D50.9 IRON DEFICIENCY ANEMIA: Status: ACTIVE | Noted: 2023-07-11

## 2023-07-11 PROBLEM — R10.32 LEFT LOWER QUADRANT PAIN: Status: ACTIVE | Noted: 2020-09-17

## 2023-07-11 PROBLEM — D72.829 LEUKOCYTOSIS: Status: ACTIVE | Noted: 2023-07-11

## 2023-07-11 RX ORDER — POLYETHYLENE GLYCOL 3350 17 G/17G
17 POWDER, FOR SOLUTION ORAL AS NEEDED
COMMUNITY
Start: 2023-04-20 | End: 2024-01-16 | Stop reason: WASHOUT

## 2023-07-11 RX ORDER — ATORVASTATIN CALCIUM 10 MG/1
1 TABLET, FILM COATED ORAL DAILY
COMMUNITY
End: 2023-07-13

## 2023-07-13 ENCOUNTER — OFFICE VISIT (OUTPATIENT)
Dept: PRIMARY CARE | Facility: CLINIC | Age: 78
End: 2023-07-13
Payer: MEDICARE

## 2023-07-13 VITALS
SYSTOLIC BLOOD PRESSURE: 116 MMHG | TEMPERATURE: 97.7 F | DIASTOLIC BLOOD PRESSURE: 74 MMHG | BODY MASS INDEX: 17.15 KG/M2 | WEIGHT: 96.8 LBS | HEART RATE: 96 BPM | OXYGEN SATURATION: 96 % | HEIGHT: 63 IN

## 2023-07-13 DIAGNOSIS — E78.5 HYPERLIPIDEMIA, UNSPECIFIED HYPERLIPIDEMIA TYPE: ICD-10-CM

## 2023-07-13 DIAGNOSIS — I63.139 CEREBROVASCULAR ACCIDENT (CVA) DUE TO EMBOLISM OF CAROTID ARTERY, UNSPECIFIED BLOOD VESSEL LATERALITY (MULTI): ICD-10-CM

## 2023-07-13 DIAGNOSIS — I10 BENIGN ESSENTIAL HYPERTENSION: Primary | ICD-10-CM

## 2023-07-13 DIAGNOSIS — I69.359 HEMIPARESIS AS LATE EFFECT OF CEREBROVASCULAR ACCIDENT (CVA) (MULTI): ICD-10-CM

## 2023-07-13 PROCEDURE — 99214 OFFICE O/P EST MOD 30 MIN: CPT | Performed by: FAMILY MEDICINE

## 2023-07-13 PROCEDURE — 1126F AMNT PAIN NOTED NONE PRSNT: CPT | Performed by: FAMILY MEDICINE

## 2023-07-13 PROCEDURE — 1159F MED LIST DOCD IN RCRD: CPT | Performed by: FAMILY MEDICINE

## 2023-07-13 PROCEDURE — 3078F DIAST BP <80 MM HG: CPT | Performed by: FAMILY MEDICINE

## 2023-07-13 PROCEDURE — 1036F TOBACCO NON-USER: CPT | Performed by: FAMILY MEDICINE

## 2023-07-13 PROCEDURE — 3074F SYST BP LT 130 MM HG: CPT | Performed by: FAMILY MEDICINE

## 2023-07-13 ASSESSMENT — PAIN SCALES - GENERAL: PAINLEVEL: 0-NO PAIN

## 2023-07-13 ASSESSMENT — ENCOUNTER SYMPTOMS
ARTHRALGIAS: 1
CONSTITUTIONAL NEGATIVE: 1

## 2023-07-13 NOTE — PROGRESS NOTES
"Subjective   Patient ID: Janice Ko is a 77 y.o. female who presents for misc (Check left hand).    HPI     Review of Systems   Constitutional: Negative.    Cardiovascular:         Dr Perez   Musculoskeletal:  Positive for arthralgias.   Neurological:         CVA right arm hand       Objective   /74 (BP Location: Right arm)   Pulse 96   Temp 36.5 °C (97.7 °F) (Temporal)   Ht 1.6 m (5' 3\")   Wt (!) 43.9 kg (96 lb 12.8 oz)   SpO2 96%   BMI 17.15 kg/m²     Physical Exam  Neurological:      Comments: Left upper extremity hemiparesis   Psychiatric:         Mood and Affect: Mood normal.         Assessment/Plan   Problem List Items Addressed This Visit       Benign essential hypertension - Primary    Cerebrovascular accident (CMS/HCC)    Hyperlipidemia    Hemiparesis as late effect of cerebrovascular accident (CVA) (CMS/HCC)          "

## 2023-07-20 ENCOUNTER — HOME VISIT (OUTPATIENT)
Dept: POST ACUTE CARE | Facility: EXTERNAL LOCATION | Age: 78
End: 2023-07-20
Payer: MEDICARE

## 2023-07-20 DIAGNOSIS — K51.919 ULCERATIVE COLITIS WITH COMPLICATION, UNSPECIFIED LOCATION (MULTI): ICD-10-CM

## 2023-07-20 DIAGNOSIS — Z86.2 HISTORY OF ANEMIA: ICD-10-CM

## 2023-07-20 DIAGNOSIS — Z00.00 ENCOUNTER FOR MEDICARE ANNUAL WELLNESS EXAM: Primary | ICD-10-CM

## 2023-07-20 DIAGNOSIS — J44.9 CHRONIC OBSTRUCTIVE PULMONARY DISEASE, UNSPECIFIED COPD TYPE (MULTI): ICD-10-CM

## 2023-07-20 DIAGNOSIS — I69.359 HEMIPARESIS AS LATE EFFECT OF CEREBROVASCULAR ACCIDENT (CVA) (MULTI): ICD-10-CM

## 2023-07-20 PROCEDURE — 99397 PER PM REEVAL EST PAT 65+ YR: CPT | Performed by: EMERGENCY MEDICINE

## 2023-07-20 PROCEDURE — 99497 ADVNCD CARE PLAN 30 MIN: CPT | Performed by: EMERGENCY MEDICINE

## 2023-07-20 PROCEDURE — G0439 PPPS, SUBSEQ VISIT: HCPCS | Performed by: EMERGENCY MEDICINE

## 2023-07-20 PROCEDURE — 99348 HOME/RES VST EST LOW MDM 30: CPT | Performed by: EMERGENCY MEDICINE

## 2023-08-08 ENCOUNTER — TELEPHONE (OUTPATIENT)
Dept: PRIMARY CARE | Facility: CLINIC | Age: 78
End: 2023-08-08
Payer: MEDICARE

## 2023-08-08 DIAGNOSIS — I69.359 HEMIPARESIS AS LATE EFFECT OF CEREBROVASCULAR ACCIDENT (CVA) (MULTI): Primary | ICD-10-CM

## 2023-08-08 NOTE — TELEPHONE ENCOUNTER
Pt. Was here few weeks ago & is moving to senior living apts.  She was wondering  If she could get a handicaped placard.  She will be parking farther away from building  Also has a brace on leg.  Please advise  Ty [phone]

## 2023-08-23 ASSESSMENT — ACTIVITIES OF DAILY LIVING (ADL)
GROCERY_SHOPPING: NEEDS ASSISTANCE
MANAGING_FINANCES: NEEDS ASSISTANCE
BATHING: NEEDS ASSISTANCE
DRESSING: NEEDS ASSISTANCE
DOING_HOUSEWORK: NEEDS ASSISTANCE
TAKING_MEDICATION: NEEDS ASSISTANCE

## 2023-08-23 ASSESSMENT — PATIENT HEALTH QUESTIONNAIRE - PHQ9
1. LITTLE INTEREST OR PLEASURE IN DOING THINGS: SEVERAL DAYS
10. IF YOU CHECKED OFF ANY PROBLEMS, HOW DIFFICULT HAVE THESE PROBLEMS MADE IT FOR YOU TO DO YOUR WORK, TAKE CARE OF THINGS AT HOME, OR GET ALONG WITH OTHER PEOPLE: NOT DIFFICULT AT ALL
SUM OF ALL RESPONSES TO PHQ9 QUESTIONS 1 AND 2: 1
2. FEELING DOWN, DEPRESSED OR HOPELESS: NOT AT ALL

## 2023-08-23 NOTE — PROGRESS NOTES
Provider Impression    MEDICARE WELLNESS      [facility]    CVA-minimal deficits. On aspirin and Plavix    Ulcerative colitis-on a steroid taper now    Hypertension-controlled with 50 mg of losartan and 5 mg of amlodipine    GERD-continue Nexium at 40 mg    Anemia she is on oral iron. Her hemoglobin has recently come up    Insomnia-continue melatonin 5 mg    Patient will follow-up with her specialist at [facility]    -Fall prevention    -Cognitive engagement     -Monitor and treat blood pressure     -Aggressive decubitus ulcer prevention.     -Bowel and bladder care     -Optimal nutrition and supplementation as needed     -GI  and DVT prophylaxis     -Symptom control     -Ambulation as tolerated     -Will follow    PH Q-9 depression screening was completed by authorized employee of the practice and answer of the questionnaire were explained and discussed with the patient.    Face-to-face with discussion completed with this individual regarding their cardiovascular risk and behavioral therapies of nutritional choices, exercise and elimination of habits contradicting to the risk. We agreed on how they may be able to reduce their current cardiovascular risk.     Screening for alcohol use completed.      I discussed advanced care planning including the explanation and discussion of advanced directives. If patient does not have current up to date documents, examples and information provided on how to create both living will and power of . Patient was encouraged to work on completing these documents.  Information and advise was also provided on DO NOT RESUSCITATE and patient encouraged to consider this  Patient code status is filed with facility     Charting is done using voice recognition software and may contain errors which have not been completely corrected            History of Present Illness    MEDICARE Infratel      [facility]      Patient is unable to give detailed history and  therefore history is obtained from the chart    History from hospitalization-      Patient is unable to give detailed history and therefore history is obtained from the chart    No acute complaints or concerns raised by nursing    Patient had a stroke. Decline tPA. She was admitted to [facility] neurological consultation was obtained. Patient was treated with aspirin Plavix eventually stabilized and transferred to rehab.  Patient had some blood in the stool and was eventually seen by GI and was given a high-dose prednisone taper which helped her symptoms  She was discharged to [facility] assisted living on a steroid taper    Patient  Patient is a able to give some details but has complicated past history history taken from chart includes   venous thromboembolism is   hyperlipidemia   primary hypertension   cerebral infarction recently about 2 months ago for which he was treated at [facility] and was in rehab  COPD and   ulcerative colitis      Review of Systems  Comprehensive review of symptoms was not positive for any symptoms other than HPI       Active Problems  Problems    · Acute bacterial sinusitis (461.9) (J01.90,B96.89)   · Acute deep vein thrombosis (DVT) of calf muscle vein of left lower extremity (453.42)  (I82.462)   · A-fib (427.31) (I48.91)   · Anemia (285.9) (D64.9)   · Benign essential hypertension (401.1) (I10)   · Cat bite, initial encounter (879.8,E906.3) (W55.01XA)   · Chest pain, atypical (786.59) (R07.89)   · Chronic obstructive pulmonary disease, unspecified COPD type (496) (J44.9)   · Colitis (558.9) (K52.9)   · COVID-19 (079.89) (U07.1)   · Cystocele with rectocele (618.01,618.04) (N81.10,N81.6)   · Hematuria (599.70) (R31.9)   · Hyperlipidemia, unspecified hyperlipidemia type (272.4) (E78.5)   · Mitral regurgitation (424.0) (I34.0)   · Palpitations (785.1) (R00.2)   · Pessary maintenance (V53.99) (Z46.89)   · Right wrist pain (719.43) (M25.531)   · Seasonal allergic rhinitis (477.9) (J30.2)   ·  Situational anxiety (300.09) (F41.8)   · Throat pain (784.1) (R07.0)   · Tick bite (919.4,E906.4) (W57.XXXA)   · Ulcerative colitis (556.9) (K51.90)   · Urinary frequency (788.41) (R35.0)   · Vaginal bleeding (623.8) (N93.9)   · Venous insufficiency, peripheral (459.81) (I87.2)    Past Medical History  Problems    · History of chest pain (V13.89) (Z87.898)   · History of mitral valve prolapse (V12.59) (Z86.79)   · History of Varicose veins of lower extremities with inflammation (454.1) (I83.10)    Surgical History  Problems    · History of Biopsy Breast Open   · History of Cataract Surgery    Family History  Mother    · Family history of congestive heart failure (V17.49) (Z82.49)   · Family history of hypertension (V17.49) (Z82.49)   · Family history of malignant neoplasm of breast (V16.3) (Z80.3)  Father    · Family history of hypertension (V17.49) (Z82.49)   · Family history of myocardial infarction (V17.3) (Z82.49)   · Family history of stroke-in-evolution syndrome (V17.1) (Z82.3)  Brother    · Family history of hypertension (V17.49) (Z82.49)    Social History  Problems    · Caffeine use (V49.89) (Z78.9)   · Former smoker (V15.82) (Z87.891)   · Used from 1827-4575 1/4 per day   · Occasional alcohol use   · Glass of wine or larger beer occassionally; apporx 4 per week    Allergies  Medication    · TETANUS   Allergy; Swelling; Updated By: Araseli Shin; 7/9/2018 2:45:51 PM   · EPINEPHrine Base AERS   Recorded By: Roselia Lopez; 4/28/2017 11:19:49 AM    Physical Exam    Vital signs as per nursing/MA documentation were reviewd  General appearance: Alert and in no acute distress  HEENT: Normal Inspection  Neck - Normal Inspectiopn  Respiratory : No respiratory distress.   Lungs have reduced breath sounds   Cardiovascular: S1 S2 No gallop  Back - normal inspection  Skin inspection:Warm  Musculoskeletal : No deformities  Neuro : Limited exam. Baseline  Psychiatric : Cooperative

## 2023-08-29 ENCOUNTER — NURSING HOME VISIT (OUTPATIENT)
Dept: POST ACUTE CARE | Facility: EXTERNAL LOCATION | Age: 78
End: 2023-08-29
Payer: MEDICARE

## 2023-08-29 DIAGNOSIS — S72.002D CLOSED FRACTURE OF LEFT HIP WITH ROUTINE HEALING, SUBSEQUENT ENCOUNTER: Primary | ICD-10-CM

## 2023-08-29 DIAGNOSIS — K51.919 ULCERATIVE COLITIS WITH COMPLICATION, UNSPECIFIED LOCATION (MULTI): ICD-10-CM

## 2023-08-29 DIAGNOSIS — I69.359 HEMIPARESIS AS LATE EFFECT OF CEREBROVASCULAR ACCIDENT (CVA) (MULTI): ICD-10-CM

## 2023-08-29 DIAGNOSIS — J44.9 CHRONIC OBSTRUCTIVE PULMONARY DISEASE, UNSPECIFIED COPD TYPE (MULTI): ICD-10-CM

## 2023-08-29 DIAGNOSIS — S52.509D CLOSED FRACTURE OF DISTAL END OF RADIUS WITH ROUTINE HEALING, UNSPECIFIED FRACTURE MORPHOLOGY, UNSPECIFIED LATERALITY, SUBSEQUENT ENCOUNTER: ICD-10-CM

## 2023-08-29 PROCEDURE — 99306 1ST NF CARE HIGH MDM 50: CPT | Performed by: EMERGENCY MEDICINE

## 2023-08-29 PROCEDURE — 99497 ADVNCD CARE PLAN 30 MIN: CPT | Performed by: EMERGENCY MEDICINE

## 2023-08-29 NOTE — LETTER
Patient: Janice Ko  : 1945    Encounter Date: 2023    Janice Ko   77 y.o.  female  @[email or web address]@            Assessment and Plan:  History and physical  77-year-old    Accidental fall  Communicated fracture of distal radius  Ulnar styloid fracture  Left hip intertrochanteric fracture  S/p CVA with baseline weakness  Hypertension  Ulcerative colitis      s/p L hip Cephallomedullary nail for intertrochanteric femur fracture  Pain control  Patient was transfused for low hemoglobin-  H&H is stable since then  Blood pressure is low-hold antihypertensives  Wrist has been splinted  Continue baseline meds  GI due to prophylaxis  OT PT eval after likely surgery  Patient's acute rehabilitation was declined  Patient to be discharged to [facility] for [facility]    ascorbic acid 500 mg oral tablet 500 mg = 1 tabs, ORAL, DAILY  aspirin 81 mg oral delayed release tablet 81 mg = 1 tabs, ORAL, DAILY  CoQ10 100 mg oral capsule 200 mg = 2 caps, ORAL, DAILY  Crestor 20 mg oral tablet 20 mg = 1 tabs, ORAL, DAILY  ferrous sulfate 325 mg (65 mg elemental iron) oral tablet 325 mg = 1 tabs, ORAL, EVERY OTHER DAY  lactobacillus acidophilus = Floranex, Florajen, Lactinex Granules 1 packets, ORAL, DAILY  losartan 50 mg oral tablet 50 mg = 1 tabs, ORAL, BID  multivitamin 1 tabs, ORAL, DAILY  Norvasc 10 mg oral tablet 5 mg = 0.5 tabs, ORAL, DAILY  Pentasa 500 mg oral capsule, extended release 1,000 mg = 2 caps, ORAL, TID  Ultram 50 mg oral tablet 100 mg = 2 tabs, PRN, ORAL, [id number]  Vitamin D3 25 mcg (1000 intl units) oral capsule 25 mcg = 1 caps, ORAL, DAILY      I discussed advanced care planning including the explanation and discussion of advanced directives. If patient does not have current up to date documents, examples and information provided on how to create both living will and power of . Patient was encouraged to work on completing these documents.  Information and  advise was also provided on DO NOT RESUSCITATE and patient encouraged to consider this  Patient is not sure about DNR at this time    Source of history: Nurse, Medical personnel, Medical record, Patient.  History limitation: None.    HPI:  History and physical    Patient is unable to give any detailed history and therefore history is obtained from the chart  No acute complaints voiced by the patient or acute concerns raised by nursing    History of hospitalization-    77-year-old who resides at a assisted living facility with past history of hypertension, s/p CVA, ulcerative colitis had accidental fall at the facility patient complained of left wrist pain and left hip pain  Denied any loss of consciousness or neck pain  She is baseline left-sided weakness secondary to CVA  In the ER she was found to have left hip fracture, left distal radial fracture and ulnar styloid process fracture was admitted for further care      Histories   Past Medical History: Past Medical HistoryNo qualifying data available.      Family History: Son: Ulcerative colitis..      Procedure history: Colonoscopy.: 2018  Cataract: 2014  History of Biopsy Breast Open  x 2      Social History        Social & Psychosocial History  Social History  Alcohol    Current, Beer, Wine, 3-5 times per week    Exercise  Regular exercise    Other      Comment: G4 SVDx4 (09/23/2020 11:34 - Diane Mullins)    Sexual  No Sexual Activity    Substance Abuse  Denies Substance Abuse    Tobacco  Denies Tobacco Use  Never (less than 100 in lifetime) Tobacco Use:.    Psychosocial History   No active psychosocial history has been recorded  .        Health Status      Allergies (4) Active Reaction  EPINEPHrine None Documented  erythromycin None Documented  sulfa drugs None Documented  tetanus toxoid temperature, swelling    Active Problems (46)  Acute deep venous thrombosis of calf..   Anemia   Anemia   Anxiety   Anxiety   Arthritis   Arthritis   At risk for falls    Atypical chest pain   Benign essential hypertension   Blood Products Refused   Blood Products Refused   Cerebrovascular accident   COVID-19   Cystocele   Decreased body mass index   Delay when starting to pass urine   Difficulty walking   Electrocardiogram abnormal   Finding of functional performance and activity   Gastroesophageal reflux disease   Hemiparesis as late effect of cerebrovascular accident   History of anemia   History of chest pain   Hyperlipidemia   Hypertension   Hypertensive disorder   Increased frequency of urination   Iron deficiency anemia   Left lower quadrant pain   Leukocytosis   Loss of hair   Mitral valve regurgitation   Palpitations   Paroxysmal supraventricular tachycardia   Patient encounter status   Seasonal allergic rhinitis   Tick bite   Ulcerative colitis   Ulcerative colitis   Urinary hesitancy   Uterine prolapse   Uterine prolapse   Vaginal bleeding.   Varicose vein of lower limb with phlebitis   Vitamin D deficiency          Current Outpatient Medications   Medication Sig Dispense Refill   • amLODIPine (Norvasc) 10 mg tablet Take 0.5 tablets (5 mg) by mouth once daily.     • ascorbic acid (Vitamin C) 500 mg chewable tablet Chew 1 tablet (500 mg) once daily.     • ascorbic acid (Vitamin C) 500 mg tablet Take 1 tablet (500 mg) by mouth once daily.     • aspirin 81 mg EC tablet Take 1 tablet (81 mg) by mouth once daily.     • CALCIUM 26-VIT D3-MAGNESIUM 15 ORAL      • calcium carbonate-vitamin D3 (Calcium 600 + D,3,) 600 mg-5 mcg (200 unit) tablet Take 1 tablet by mouth in the morning and 1 tablet in the evening and 1 tablet before bedtime.     • calcium carbonate-vitamin D3 600 mg-20 mcg (800 unit) tablet Take 1 tablet by mouth once daily.     • cholecalciferol (Thera-D) 50 MCG (2000 UT) tablet Take 1 tablet (50 mcg) by mouth once daily.     • cholecalciferol (Vitamin D-3) 25 MCG (1000 UT) tablet Take 1 tablet (25 mcg) by mouth once daily.     • cholecalciferol (Vitamin D-3) 25 MCG  (1000 UT) tablet Take 1 tablet (1,000 Units) by mouth once daily.     • coenzyme Q-10 100 mg capsule Take by mouth.     • coenzyme Q-10 200 mg capsule Take by mouth.     • COQ10, LIPOSOMAL UBIQUINOL, ORAL      • ferrous sulfate 325 (65 Fe) MG tablet Take 1 tablet (325 mg) by mouth every other day.     • lactobacillus (Lactinex) 25 million cell granule partial packet Take by mouth.     • lactobacillus acidophilus (Lactinex) 100 million cell packet Take by mouth.     • losartan (Cozaar) 50 mg tablet Take by mouth.     • melatonin 5 mg tablet Take 1 tablet (5 mg) by mouth once daily at bedtime.     • mesalamine (Pentasa) 500 mg ER capsule Take 2 capsules (1,000 mg) by mouth in the morning and 2 capsules (1,000 mg) at noon and 2 capsules (1,000 mg) in the evening and 2 capsules (1,000 mg) before bedtime.     • mesalamine ER (Apriso) 0.375 gram 24 hr capsule      • multivit with minerals/lutein (MULTIVITAMIN 50 PLUS ORAL) 1 tabs, ORAL, DAILY, # 100 tabs, Refill(s) 0, Date: 10/09/2018 16:20:00 EDT     • multivitamin tablet      • multivitamin with iron (pediatric multivitamin-iron) tablet chewable split tablet Take 1 half tablet by mouth once daily.     • multivitamin with minerals (multivit-min-iron fum-folic ac) tablet Take 1 tablet by mouth once daily.     • pedi multivit no.17 w-fluoride (Poly-Vi-Mariel) 0.5 mg tablet,chewable Chew 1 tablet once daily.     • polyethylene glycol (Glycolax, Miralax) 17 gram/dose powder Take 17 g by mouth if needed.     • rosuvastatin (Crestor) 20 mg tablet Take 1 tablet (20 mg) by mouth once daily.     • zinc amino acid chelate 50 mg tablet Take 1 tablet (50 mg) by mouth once daily.     • zinc sulfate (Zincate) 220 (50 Zn) MG capsule        No current facility-administered medications for this visit.       Physical Exam:  Vital signs as per nursing/MA documentation were reviewed  General appearance: Alert and in no acute distress  HEENT: Normal Inspection  Neck - Normal  Inspection  Respiratory : No respiratory distress. Lungs are clear   Cardiovascular: heart rate normal. No gallop  Back - normal inspection  Skin inspection:Warm  Musculoskeletal : No deformities  Neuro : Limited exam. Baseline    ROS: Review of symptoms is negative except for what is mentioned in HPI    Results/Data  Records including but not limited to electronic medical records, relevant lab work and imaging from patient's health care facility encounter were reviewed and independently verified      Charting was completed using voice recognition technology and may include unintended errors.    Discussed with patient/family, RN, and NP.      Electronically Signed By: Carlo Oh MD   8/31/23  1:32 PM

## 2023-08-31 PROBLEM — S52.509D: Status: ACTIVE | Noted: 2023-08-31

## 2023-08-31 PROBLEM — S72.002D CLOSED FRACTURE OF LEFT HIP WITH ROUTINE HEALING: Status: ACTIVE | Noted: 2023-08-31

## 2023-08-31 NOTE — PROGRESS NOTES
Janice Ko   77 y.o.  female  @location@            Assessment and Plan:  History and physical  77-year-old    Accidental fall  Communicated fracture of distal radius  Ulnar styloid fracture  Left hip intertrochanteric fracture  S/p CVA with baseline weakness  Hypertension  Ulcerative colitis      s/p L hip Cephallomedullary nail for intertrochanteric femur fracture  Pain control  Patient was transfused for low hemoglobin-  H&H is stable since then  Blood pressure is low-hold antihypertensives  Wrist has been splinted  Continue baseline meds  GI due to prophylaxis  OT PT eval after likely surgery  Patient's acute rehabilitation was declined  Patient to be discharged to [facility] for rehabilitation    ascorbic acid 500 mg oral tablet 500 mg = 1 tabs, ORAL, DAILY  aspirin 81 mg oral delayed release tablet 81 mg = 1 tabs, ORAL, DAILY  CoQ10 100 mg oral capsule 200 mg = 2 caps, ORAL, DAILY  Crestor 20 mg oral tablet 20 mg = 1 tabs, ORAL, DAILY  ferrous sulfate 325 mg (65 mg elemental iron) oral tablet 325 mg = 1 tabs, ORAL, EVERY OTHER DAY  lactobacillus acidophilus = Floranex, Florajen, Lactinex Granules 1 packets, ORAL, DAILY  losartan 50 mg oral tablet 50 mg = 1 tabs, ORAL, BID  multivitamin 1 tabs, ORAL, DAILY  Norvasc 10 mg oral tablet 5 mg = 0.5 tabs, ORAL, DAILY  Pentasa 500 mg oral capsule, extended release 1,000 mg = 2 caps, ORAL, TID  Ultram 50 mg oral tablet 100 mg = 2 tabs, PRN, ORAL, [id number]  Vitamin D3 25 mcg (1000 intl units) oral capsule 25 mcg = 1 caps, ORAL, DAILY      I discussed advanced care planning including the explanation and discussion of advanced directives. If patient does not have current up to date documents, examples and information provided on how to create both living will and power of . Patient was encouraged to work on completing these documents.  Information and advise was also provided on DO NOT RESUSCITATE and patient encouraged to  consider this  Patient is not sure about DNR at this time    Source of history: Nurse, Medical personnel, Medical record, Patient.  History limitation: None.    HPI:  History and physical    Patient is unable to give any detailed history and therefore history is obtained from the chart  No acute complaints voiced by the patient or acute concerns raised by nursing    History of hospitalization-    77-year-old who resides at a assisted living facility with past history of hypertension, s/p CVA, ulcerative colitis had accidental fall at the facility patient complained of left wrist pain and left hip pain  Denied any loss of consciousness or neck pain  She is baseline left-sided weakness secondary to CVA  In the ER she was found to have left hip fracture, left distal radial fracture and ulnar styloid process fracture was admitted for further care      Histories   Past Medical History: Past Medical HistoryNo qualifying data available.      Family History: Son: Ulcerative colitis..      Procedure history: Colonoscopy.: 2018  Cataract: 2014  History of Biopsy Breast Open  x 2      Social History        Social & Psychosocial History  Social History  Alcohol    Current, Beer, Wine, 3-5 times per week    Exercise  Regular exercise    Other      Comment: G4 SVDx4 (09/23/2020 11:34 - Diane Mullins)    Sexual  No Sexual Activity    Substance Abuse  Denies Substance Abuse    Tobacco  Denies Tobacco Use  Never (less than 100 in lifetime) Tobacco Use:.    Psychosocial History   No active psychosocial history has been recorded  .        Health Status      Allergies (4) Active Reaction  EPINEPHrine None Documented  erythromycin None Documented  sulfa drugs None Documented  tetanus toxoid temperature, swelling    Active Problems (46)  Acute deep venous thrombosis of calf..   Anemia   Anemia   Anxiety   Anxiety   Arthritis   Arthritis   At risk for falls   Atypical chest pain   Benign essential hypertension   Blood Products  Refused   Blood Products Refused   Cerebrovascular accident   COVID-19   Cystocele   Decreased body mass index   Delay when starting to pass urine   Difficulty walking   Electrocardiogram abnormal   Finding of functional performance and activity   Gastroesophageal reflux disease   Hemiparesis as late effect of cerebrovascular accident   History of anemia   History of chest pain   Hyperlipidemia   Hypertension   Hypertensive disorder   Increased frequency of urination   Iron deficiency anemia   Left lower quadrant pain   Leukocytosis   Loss of hair   Mitral valve regurgitation   Palpitations   Paroxysmal supraventricular tachycardia   Patient encounter status   Seasonal allergic rhinitis   Tick bite   Ulcerative colitis   Ulcerative colitis   Urinary hesitancy   Uterine prolapse   Uterine prolapse   Vaginal bleeding.   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      ascorbic acid (Vitamin C) 500 mg chewable tablet Chew 1 tablet (500 mg) once daily.      ascorbic acid (Vitamin C) 500 mg tablet Take 1 tablet (500 mg) by mouth once daily.      aspirin 81 mg EC tablet Take 1 tablet (81 mg) by mouth once daily.      CALCIUM 26-VIT D3-MAGNESIUM 15 ORAL       calcium carbonate-vitamin D3 (Calcium 600 + D,3,) 600 mg-5 mcg (200 unit) tablet Take 1 tablet by mouth in the morning and 1 tablet in the evening and 1 tablet before bedtime.      calcium carbonate-vitamin D3 600 mg-20 mcg (800 unit) tablet Take 1 tablet by mouth once daily.      cholecalciferol (Thera-D) 50 MCG (2000 UT) tablet Take 1 tablet (50 mcg) by mouth once daily.      cholecalciferol (Vitamin D-3) 25 MCG (1000 UT) tablet Take 1 tablet (25 mcg) by mouth once daily.      cholecalciferol (Vitamin D-3) 25 MCG (1000 UT) tablet Take 1 tablet (1,000 Units) by mouth once daily.      coenzyme  Q-10 100 mg capsule Take by mouth.      coenzyme Q-10 200 mg capsule Take by mouth.      COQ10, LIPOSOMAL UBIQUINOL, ORAL       ferrous sulfate 325 (65 Fe) MG tablet Take 1 tablet (325 mg) by mouth every other day.      lactobacillus (Lactinex) 25 million cell granule partial packet Take by mouth.      lactobacillus acidophilus (Lactinex) 100 million cell packet Take by mouth.      losartan (Cozaar) 50 mg tablet Take by mouth.      melatonin 5 mg tablet Take 1 tablet (5 mg) by mouth once daily at bedtime.      mesalamine (Pentasa) 500 mg ER capsule Take 2 capsules (1,000 mg) by mouth in the morning and 2 capsules (1,000 mg) at noon and 2 capsules (1,000 mg) in the evening and 2 capsules (1,000 mg) before bedtime.      mesalamine ER (Apriso) 0.375 gram 24 hr capsule       multivit with minerals/lutein (MULTIVITAMIN 50 PLUS ORAL) 1 tabs, ORAL, DAILY, # 100 tabs, Refill(s) 0, Date: 10/09/2018 16:20:00 EDT      multivitamin tablet       multivitamin with iron (pediatric multivitamin-iron) tablet chewable split tablet Take 1 half tablet by mouth once daily.      multivitamin with minerals (multivit-min-iron fum-folic ac) tablet Take 1 tablet by mouth once daily.      pedi multivit no.17 w-fluoride (Poly-Vi-Mariel) 0.5 mg tablet,chewable Chew 1 tablet once daily.      polyethylene glycol (Glycolax, Miralax) 17 gram/dose powder Take 17 g by mouth if needed.      rosuvastatin (Crestor) 20 mg tablet Take 1 tablet (20 mg) by mouth once daily.      zinc amino acid chelate 50 mg tablet Take 1 tablet (50 mg) by mouth once daily.      zinc sulfate (Zincate) 220 (50 Zn) MG capsule        No current facility-administered medications for this visit.       Physical Exam:  Vital signs as per nursing/MA documentation were reviewed  General appearance: Alert and in no acute distress  HEENT: Normal Inspection  Neck - Normal Inspection  Respiratory : No respiratory distress. Lungs are clear   Cardiovascular: heart rate normal. No  gallop  Back - normal inspection  Skin inspection:Warm  Musculoskeletal : No deformities  Neuro : Limited exam. Baseline    ROS: Review of symptoms is negative except for what is mentioned in HPI    Results/Data  Records including but not limited to electronic medical records, relevant lab work and imaging from patient's health care facility encounter were reviewed and independently verified      Charting was completed using voice recognition technology and may include unintended errors.    Discussed with patient/family, RN, and NP.

## 2023-09-14 LAB
CLUE CELLS: ABNORMAL
NUGENT SCORE: 4
VAGINITIS-BV + YEAST INTERPRETATION: ABNORMAL
YEAST: ABNORMAL

## 2023-09-21 ENCOUNTER — HOME VISIT (OUTPATIENT)
Dept: POST ACUTE CARE | Facility: EXTERNAL LOCATION | Age: 78
End: 2023-09-21
Payer: MEDICARE

## 2023-09-21 DIAGNOSIS — S72.002D CLOSED FRACTURE OF LEFT HIP WITH ROUTINE HEALING: ICD-10-CM

## 2023-09-21 DIAGNOSIS — Z76.89 ENCOUNTER FOR SUPPORT AND COORDINATION OF TRANSITION OF CARE: Primary | ICD-10-CM

## 2023-09-21 DIAGNOSIS — K51.919 ULCERATIVE COLITIS WITH COMPLICATION, UNSPECIFIED LOCATION (MULTI): ICD-10-CM

## 2023-09-21 DIAGNOSIS — I11.9 CARDIOMYOPATHY, HYPERTENSIVE, BENIGN, WITHOUT HEART FAILURE (MULTI): ICD-10-CM

## 2023-09-21 DIAGNOSIS — I43 CARDIOMYOPATHY, HYPERTENSIVE, BENIGN, WITHOUT HEART FAILURE (MULTI): ICD-10-CM

## 2023-09-21 DIAGNOSIS — S52.509D CLOSED FRACTURE OF DISTAL END OF RADIUS WITH ROUTINE HEALING, UNSPECIFIED FRACTURE MORPHOLOGY, UNSPECIFIED LATERALITY, SUBSEQUENT ENCOUNTER: ICD-10-CM

## 2023-09-21 DIAGNOSIS — I63.9 CEREBROVASCULAR ACCIDENT (CVA), UNSPECIFIED MECHANISM (MULTI): ICD-10-CM

## 2023-09-21 PROCEDURE — 99496 TRANSJ CARE MGMT HIGH F2F 7D: CPT | Performed by: EMERGENCY MEDICINE

## 2023-10-10 NOTE — PROGRESS NOTES
Janice Ko   77 y.o.  female  @location@            Assessment and Plan:  Transitional care management    Patient is a long-term resident of [facility] living    77-year-old    Accidental fall  Communicated fracture of distal radius  Ulnar styloid fracture  Left hip intertrochanteric fracture  S/p CVA with baseline weakness  Hypertension  Ulcerative colitis      s/p L hip Cephallomedullary nail for intertrochanteric femur fracture  Pain control  Patient was transfused for low hemoglobin-  H&H is stable since then  Blood pressure is low-hold antihypertensives  Wrist has been splinted  Continue baseline meds  GI due to prophylaxis  OT PT eval after likely surgery    ascorbic acid 500 mg oral tablet 500 mg = 1 tabs, ORAL, DAILY  aspirin 81 mg oral delayed release tablet 81 mg = 1 tabs, ORAL, DAILY  CoQ10 100 mg oral capsule 200 mg = 2 caps, ORAL, DAILY  Crestor 20 mg oral tablet 20 mg = 1 tabs, ORAL, DAILY  ferrous sulfate 325 mg (65 mg elemental iron) oral tablet 325 mg = 1 tabs, ORAL, EVERY OTHER DAY  lactobacillus acidophilus = Floranex, Florajen, Lactinex Granules 1 packets, ORAL, DAILY  losartan 50 mg oral tablet 50 mg = 1 tabs, ORAL, BID  multivitamin 1 tabs, ORAL, DAILY  Norvasc 10 mg oral tablet 5 mg = 0.5 tabs, ORAL, DAILY  Pentasa 500 mg oral capsule, extended release 1,000 mg = 2 caps, ORAL, TID  Ultram 50 mg oral tablet 100 mg = 2 tabs, PRN, ORAL, [id number]  Vitamin D3 25 mcg (1000 intl units) oral capsule 25 mcg = 1 caps, ORAL, DAILY          Source of history: Nurse, Medical personnel, Medical record, Patient.  History limitation: None.    HPI:  Patient was discharged from [facility] for rehabilitation on September 15_ and there was interactive contact by patient/family or facility staff on behalf of patient with me/office within 2 working days regarding patient's transition    Prior to her stay at rehabilitation, patient was admitted to [facility]  hospital    Patient was recently admitted to the hospital.  Patient's history regarding the reason for hospital admission and the course in the hospital was reviewed with patinet and/or family.  Patient's medical records including lab work, radiology and other medical notes were reviewed.  His medication list prior to admission and discharge medication list are compared and updated.    Patient encounter was done face-to-face    Patient is unable to give detailed history and therefore history is obtained from the chart    History from hospitalization-    77-year-old who resides at a assisted living facility with past history of hypertension, s/p CVA, ulcerative colitis had accidental fall at the facility patient complained of left wrist pain and left hip pain  Denied any loss of consciousness or neck pain  She is baseline left-sided weakness secondary to CVA  In the ER she was found to have left hip fracture, left distal radial fracture and ulnar styloid process fracture was admitted for further care      Histories   Past Medical History: Past Medical HistoryNo qualifying data available.      Family History: Son: Ulcerative colitis..      Procedure history: Colonoscopy.: 2018  Cataract: 2014  History of Biopsy Breast Open  x 2      Social History        Social & Psychosocial History  Social History  Alcohol    Current, Beer, Wine, 3-5 times per week    Exercise  Regular exercise    Other      Comment: G4 SVDx4 (09/23/2020 11:34 - Diane Mullins)    Sexual  No Sexual Activity    Substance Abuse  Denies Substance Abuse    Tobacco  Denies Tobacco Use  Never (less than 100 in lifetime) Tobacco Use:.    Psychosocial History   No active psychosocial history has been recorded  .        Health Status      Allergies (4) Active Reaction  EPINEPHrine None Documented  erythromycin None Documented  sulfa drugs None Documented  tetanus toxoid temperature, swelling    Active Problems (46)  Acute deep venous thrombosis of  calf..   Anemia   Anemia   Anxiety   Anxiety   Arthritis   Arthritis   At risk for falls   Atypical chest pain   Benign essential hypertension   Blood Products Refused   Blood Products Refused   Cerebrovascular accident   COVID-19   Cystocele   Decreased body mass index   Delay when starting to pass urine   Difficulty walking   Electrocardiogram abnormal   Finding of functional performance and activity   Gastroesophageal reflux disease   Hemiparesis as late effect of cerebrovascular accident   History of anemia   History of chest pain   Hyperlipidemia   Hypertension   Hypertensive disorder   Increased frequency of urination   Iron deficiency anemia   Left lower quadrant pain   Leukocytosis   Loss of hair   Mitral valve regurgitation   Palpitations   Paroxysmal supraventricular tachycardia   Patient encounter status   Seasonal allergic rhinitis   Tick bite   Ulcerative colitis   Ulcerative colitis   Urinary hesitancy   Uterine prolapse   Uterine prolapse   Vaginal bleeding.   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      ascorbic acid (Vitamin C) 500 mg chewable tablet Chew 1 tablet (500 mg) once daily.      ascorbic acid (Vitamin C) 500 mg tablet Take 1 tablet (500 mg) by mouth once daily.      aspirin 81 mg EC tablet Take 1 tablet (81 mg) by mouth once daily.      CALCIUM 26-VIT D3-MAGNESIUM 15 ORAL       calcium carbonate-vitamin D3 (Calcium 600 + D,3,) 600 mg-5 mcg (200 unit) tablet Take 1 tablet by mouth in the morning and 1 tablet in the evening and 1 tablet before bedtime.      calcium carbonate-vitamin D3 600 mg-20 mcg (800 unit) tablet Take 1 tablet by mouth once daily.      cholecalciferol (Thera-D) 50 MCG (2000 UT) tablet Take 1 tablet (50 mcg) by mouth once daily.      cholecalciferol (Vitamin D-3) 25 MCG (1000 UT) tablet Take 1  tablet (25 mcg) by mouth once daily.      cholecalciferol (Vitamin D-3) 25 MCG (1000 UT) tablet Take 1 tablet (1,000 Units) by mouth once daily.      coenzyme Q-10 100 mg capsule Take by mouth.      coenzyme Q-10 200 mg capsule Take by mouth.      COQ10, LIPOSOMAL UBIQUINOL, ORAL       ferrous sulfate 325 (65 Fe) MG tablet Take 1 tablet (325 mg) by mouth every other day.      lactobacillus (Lactinex) 25 million cell granule partial packet Take by mouth.      lactobacillus acidophilus (Lactinex) 100 million cell packet Take by mouth.      losartan (Cozaar) 50 mg tablet Take by mouth.      melatonin 5 mg tablet Take 1 tablet (5 mg) by mouth once daily at bedtime.      mesalamine (Pentasa) 500 mg ER capsule Take 2 capsules (1,000 mg) by mouth in the morning and 2 capsules (1,000 mg) at noon and 2 capsules (1,000 mg) in the evening and 2 capsules (1,000 mg) before bedtime.      mesalamine ER (Apriso) 0.375 gram 24 hr capsule       multivit with minerals/lutein (MULTIVITAMIN 50 PLUS ORAL) 1 tabs, ORAL, DAILY, # 100 tabs, Refill(s) 0, Date: 10/09/2018 16:20:00 EDT      multivitamin tablet       multivitamin with iron (pediatric multivitamin-iron) tablet chewable split tablet Take 1 half tablet by mouth once daily.      multivitamin with minerals (multivit-min-iron fum-folic ac) tablet Take 1 tablet by mouth once daily.      pedi multivit no.17 w-fluoride (Poly-Vi-Mariel) 0.5 mg tablet,chewable Chew 1 tablet once daily.      polyethylene glycol (Glycolax, Miralax) 17 gram/dose powder Take 17 g by mouth if needed.      rosuvastatin (Crestor) 20 mg tablet Take 1 tablet (20 mg) by mouth once daily.      zinc amino acid chelate 50 mg tablet Take 1 tablet (50 mg) by mouth once daily.      zinc sulfate (Zincate) 220 (50 Zn) MG capsule        No current facility-administered medications for this visit.       Physical Exam:  Vital signs as per nursing/MA documentation were reviewed  General appearance: Alert and in no acute  distress  HEENT: Normal Inspection  Neck - Normal Inspection  Respiratory : No respiratory distress. Lungs are clear   Cardiovascular: heart rate normal. No gallop  Back - normal inspection  Skin inspection:Warm  Musculoskeletal : No deformities  Neuro : Limited exam. Baseline    ROS: Review of symptoms is negative except for what is mentioned in HPI    Results/Data  Records including but not limited to electronic medical records, relevant lab work and imaging from patient's health care facility encounter were reviewed and independently verified      Charting was completed using voice recognition technology and may include unintended errors.    Discussed with patient/family, RN, and NP.

## 2023-10-16 RX ORDER — L.ACID,FERM,PLA,RHA/B.BIF,LONG 126 MG
1 TABLET, DELAYED AND EXTENDED RELEASE ORAL DAILY
COMMUNITY
Start: 2023-09-11 | End: 2024-01-16 | Stop reason: WASHOUT

## 2023-10-16 RX ORDER — FOLIC ACID 1 MG/1
1 TABLET ORAL DAILY
COMMUNITY
Start: 2023-09-11 | End: 2024-01-16 | Stop reason: WASHOUT

## 2023-10-20 PROBLEM — Z86.718 PERSONAL HISTORY OF OTHER VENOUS THROMBOSIS AND EMBOLISM: Status: ACTIVE | Noted: 2022-12-30

## 2023-10-20 PROBLEM — I65.23 OCCLUSION AND STENOSIS OF BILATERAL CAROTID ARTERIES: Status: ACTIVE | Noted: 2022-12-30

## 2023-10-20 PROBLEM — M62.81 MUSCLE WEAKNESS (GENERALIZED): Status: ACTIVE | Noted: 2022-12-30

## 2023-10-20 PROBLEM — E78.00 PURE HYPERCHOLESTEROLEMIA: Status: ACTIVE | Noted: 2023-05-17

## 2023-10-20 PROBLEM — R13.12 DYSPHAGIA, OROPHARYNGEAL PHASE: Status: ACTIVE | Noted: 2022-12-30

## 2023-10-20 PROBLEM — I69.354 HEMIPLEGIA AND HEMIPARESIS FOLLOWING CEREBRAL INFARCTION AFFECTING LEFT NON-DOMINANT SIDE (MULTI): Status: ACTIVE | Noted: 2022-12-30

## 2023-10-20 PROBLEM — R47.1 DYSARTHRIA AND ANARTHRIA: Status: ACTIVE | Noted: 2022-12-30

## 2023-10-20 PROBLEM — R48.8 OTHER SYMBOLIC DYSFUNCTIONS: Status: ACTIVE | Noted: 2022-12-30

## 2023-10-20 RX ORDER — ACETAMINOPHEN 325 MG/1
650 TABLET ORAL EVERY 4 HOURS PRN
COMMUNITY
Start: 2023-09-11 | End: 2024-01-16 | Stop reason: ALTCHOICE

## 2023-10-20 RX ORDER — BISMUTH SUBSALICYLATE 262 MG
1 TABLET,CHEWABLE ORAL DAILY
COMMUNITY

## 2023-10-20 RX ORDER — AMLODIPINE BESYLATE 5 MG/1
5 TABLET ORAL DAILY
COMMUNITY
Start: 2023-10-14 | End: 2024-01-16 | Stop reason: WASHOUT

## 2023-10-20 RX ORDER — ENOXAPARIN SODIUM 100 MG/ML
INJECTION SUBCUTANEOUS
COMMUNITY
Start: 2023-09-13 | End: 2024-01-16 | Stop reason: WASHOUT

## 2023-10-20 RX ORDER — MENTHOL/SORBITOL
LOZENGE MUCOUS MEMBRANE
COMMUNITY
Start: 2023-09-16 | End: 2024-01-16 | Stop reason: WASHOUT

## 2023-10-20 RX ORDER — CALCIUM CARBONATE 600 MG
TABLET ORAL
COMMUNITY
Start: 2023-09-16 | End: 2024-01-16 | Stop reason: WASHOUT

## 2023-10-20 RX ORDER — FENOFIBRATE 120 MG/1
1 TABLET ORAL DAILY
COMMUNITY
Start: 2023-09-11 | End: 2024-01-16 | Stop reason: WASHOUT

## 2023-10-23 ENCOUNTER — APPOINTMENT (OUTPATIENT)
Dept: CARDIOLOGY | Facility: CLINIC | Age: 78
End: 2023-10-23
Payer: MEDICARE

## 2023-10-31 ENCOUNTER — HOME VISIT (OUTPATIENT)
Dept: POST ACUTE CARE | Facility: EXTERNAL LOCATION | Age: 78
End: 2023-10-31
Payer: MEDICARE

## 2023-10-31 DIAGNOSIS — I82.462 ACUTE DEEP VEIN THROMBOSIS (DVT) OF CALF MUSCLE VEIN OF LEFT LOWER EXTREMITY (MULTI): ICD-10-CM

## 2023-10-31 DIAGNOSIS — K51.919 ULCERATIVE COLITIS WITH COMPLICATION, UNSPECIFIED LOCATION (MULTI): Primary | ICD-10-CM

## 2023-10-31 DIAGNOSIS — I69.359 HEMIPARESIS AS LATE EFFECT OF CEREBROVASCULAR ACCIDENT (CVA) (MULTI): ICD-10-CM

## 2023-10-31 DIAGNOSIS — J44.9 CHRONIC OBSTRUCTIVE PULMONARY DISEASE, UNSPECIFIED COPD TYPE (MULTI): ICD-10-CM

## 2023-10-31 PROCEDURE — 99348 HOME/RES VST EST LOW MDM 30: CPT | Performed by: EMERGENCY MEDICINE

## 2023-10-31 NOTE — PROGRESS NOTES
Janice Ko   77 y.o.  female  @location@            Assessment and Plan:  Patient is a long-term resident of [facility] living    77-year-old    Accidental fall  Communicated fracture of distal radius  Ulnar styloid fracture  Left hip intertrochanteric fracture  S/p CVA with baseline weakness  Hypertension  Ulcerative colitis      s/p L hip Cephallomedullary nail for intertrochanteric femur fracture  Pain control  Patient was transfused for low hemoglobin-  H&H is stable since then  Blood pressure is low-hold antihypertensives  Wrist has been splinted  Continue baseline meds  GI due to prophylaxis  OT PT eval after likely surgery    ascorbic acid 500 mg oral tablet 500 mg = 1 tabs, ORAL, DAILY  aspirin 81 mg oral delayed release tablet 81 mg = 1 tabs, ORAL, DAILY  CoQ10 100 mg oral capsule 200 mg = 2 caps, ORAL, DAILY  Crestor 20 mg oral tablet 20 mg = 1 tabs, ORAL, DAILY  ferrous sulfate 325 mg (65 mg elemental iron) oral tablet 325 mg = 1 tabs, ORAL, EVERY OTHER DAY  lactobacillus acidophilus = Floranex, Florajen, Lactinex Granules 1 packets, ORAL, DAILY  losartan 50 mg oral tablet 50 mg = 1 tabs, ORAL, BID  multivitamin 1 tabs, ORAL, DAILY  Norvasc 10 mg oral tablet 5 mg = 0.5 tabs, ORAL, DAILY  Pentasa 500 mg oral capsule, extended release 1,000 mg = 2 caps, ORAL, TID  Ultram 50 mg oral tablet 100 mg = 2 tabs, PRN, ORAL, [id number]  Vitamin D3 25 mcg (1000 intl units) oral capsule 25 mcg = 1 caps, ORAL, DAILY    -Fall prevention    -Cognitive engagement     -Monitor and treat blood pressure     -Aggressive decubitus ulcer prevention.     -Bowel and bladder care     -Optimal nutrition and supplementation as needed     -GI  and DVT prophylaxis     -Symptom control     -Ambulation as tolerated     -Will follow    Charting is done using voice recognition software and may contain errors which have not been completely corrected        Source of history: Nurse, Medical personnel, Medical record,  Patient.  History limitation: None.    HPI:    Patient is unable to give detailed history and therefore history is obtained from the chart    History from prior hospitalization-    77-year-old who resides at a assisted living facility with past history of hypertension, s/p CVA, ulcerative colitis had accidental fall at the facility patient complained of left wrist pain and left hip pain  Denied any loss of consciousness or neck pain  She is baseline left-sided weakness secondary to CVA  In the ER she was found to have left hip fracture, left distal radial fracture and ulnar styloid process fracture was admitted for further care      Histories   Past Medical History: Past Medical HistoryNo qualifying data available.      Family History: Son: Ulcerative colitis..      Procedure history: Colonoscopy.: 2018  Cataract: 2014  History of Biopsy Breast Open  x 2      Social History        Social & Psychosocial History  Social History  Alcohol    Current, Beer, Wine, 3-5 times per week    Exercise  Regular exercise    Other      Comment: G4 SVDx4 (09/23/2020 11:34 - Diane Mullins)    Sexual  No Sexual Activity    Substance Abuse  Denies Substance Abuse    Tobacco  Denies Tobacco Use  Never (less than 100 in lifetime) Tobacco Use:.    Psychosocial History   No active psychosocial history has been recorded  .        Health Status      Allergies (4) Active Reaction  EPINEPHrine None Documented  erythromycin None Documented  sulfa drugs None Documented  tetanus toxoid temperature, swelling    Active Problems (46)  Acute deep venous thrombosis of calf..   Anemia   Anemia   Anxiety   Anxiety   Arthritis   Arthritis   At risk for falls   Atypical chest pain   Benign essential hypertension   Blood Products Refused   Blood Products Refused   Cerebrovascular accident   COVID-19   Cystocele   Decreased body mass index   Delay when starting to pass urine   Difficulty walking   Electrocardiogram abnormal   Finding of functional  performance and activity   Gastroesophageal reflux disease   Hemiparesis as late effect of cerebrovascular accident   History of anemia   History of chest pain   Hyperlipidemia   Hypertension   Hypertensive disorder   Increased frequency of urination   Iron deficiency anemia   Left lower quadrant pain   Leukocytosis   Loss of hair   Mitral valve regurgitation   Palpitations   Paroxysmal supraventricular tachycardia   Patient encounter status   Seasonal allergic rhinitis   Tick bite   Ulcerative colitis   Ulcerative colitis   Urinary hesitancy   Uterine prolapse   Uterine prolapse   Vaginal bleeding.   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 do not exceed 4,000mg a day      amLODIPine (Norvasc) 10 mg tablet Take 0.5 tablets (5 mg) by mouth once daily.      amLODIPine (Norvasc) 5 mg tablet       ascorbic acid (Vitamin C) 500 mg tablet Take 1 tablet (500 mg) by mouth once daily.      aspirin 81 mg EC tablet Take 1 tablet (81 mg) by mouth once daily.      CALCIUM 26-VIT D3-MAGNESIUM 15 ORAL       calcium carbonate 600 mg calcium (1,500 mg) tablet       cholecalciferol (Vitamin D-3) 25 MCG (1000 UT) tablet Take 1 tablet (1,000 Units) by mouth once daily.      coenzyme Q-10 100 mg capsule Take by mouth.      COQ10, LIPOSOMAL UBIQUINOL, ORAL       enoxaparin (Lovenox) 30 mg/0.3 mL syringe       fenofibrate (Fenoglide) 120 mg tablet Take 1 tablet (120 mg) by mouth once daily.      ferrous sulfate 325 (65 Fe) MG tablet Take 1 tablet (325 mg) by mouth every other day.      folic acid (Folvite) 1 mg tablet Take 1 tablet (1 mg) by mouth once daily.      L.acid,ferm,ricardo,rha-B.bif,long (Controlled Delivery Probiotic) 126 mg (2 billion cell) tablet,delayed and ext.release Take 1 tablet by mouth once daily.       lactobacillus acidophilus (Lactinex) 100 million cell packet Take by mouth.      LACTOBACILLUS ACIDOPHILUS ORAL Take 1 packet by mouth twice a day.      losartan (Cozaar) 50 mg tablet Take by mouth.      melatonin 5 mg tablet Take 1 tablet (5 mg) by mouth once daily at bedtime.      mesalamine (Pentasa) 500 mg ER capsule Take 2 capsules (1,000 mg) by mouth in the morning and 2 capsules (1,000 mg) at noon and 2 capsules (1,000 mg) in the evening and 2 capsules (1,000 mg) before bedtime.      multivitamin tablet Take 1 tablet by mouth once daily.      pedi multivit no.17 w-fluoride (Poly-Vi-Mariel) 0.5 mg tablet,chewable Chew 1 tablet once daily.      polyethylene glycol (Glycolax, Miralax) 17 gram/dose powder Take 17 g by mouth if needed.      Probiotic, S.boulardii, 250 mg capsule       rosuvastatin (Crestor) 20 mg tablet Take 1 tablet (20 mg) by mouth once daily.      zinc amino acid chelate 50 mg tablet Take 1 tablet (50 mg) by mouth once daily.       No current facility-administered medications for this visit.       Physical Exam:  Vital signs as per nursing/MA documentation were reviewed  General appearance: Alert and in no acute distress  HEENT: Normal Inspection  Neck - Normal Inspection  Respiratory : No respiratory distress. Lungs are clear   Cardiovascular: heart rate normal. No gallop  Back - normal inspection  Skin inspection:Warm  Musculoskeletal : No deformities  Neuro : Limited exam. Baseline    ROS: Review of symptoms is negative except for what is mentioned in HPI    Results/Data  Records including but not limited to electronic medical records, relevant lab work and imaging from patient's health care facility encounter were reviewed and independently verified      Charting was completed using voice recognition technology and may include unintended errors.    Discussed with patient/family, RN, and NP.

## 2023-11-07 ENCOUNTER — APPOINTMENT (OUTPATIENT)
Dept: PRIMARY CARE | Facility: CLINIC | Age: 78
End: 2023-11-07
Payer: MEDICARE

## 2023-11-16 ENCOUNTER — TELEPHONE (OUTPATIENT)
Dept: PRIMARY CARE | Facility: CLINIC | Age: 78
End: 2023-11-16
Payer: MEDICARE

## 2023-11-16 NOTE — TELEPHONE ENCOUNTER
Pt called and states that she is not doing well from the C-Diff. Pt states that no doctors will call her back due to she has some questions. Pt would like to know if you could give her a call when you have a few. Best Number to reach her at is [phone].

## 2023-11-17 ENCOUNTER — APPOINTMENT (OUTPATIENT)
Dept: PRIMARY CARE | Facility: CLINIC | Age: 78
End: 2023-11-17
Payer: MEDICARE

## 2023-11-30 ENCOUNTER — HOME VISIT (OUTPATIENT)
Dept: POST ACUTE CARE | Facility: EXTERNAL LOCATION | Age: 78
End: 2023-11-30
Payer: MEDICARE

## 2023-11-30 ENCOUNTER — TELEPHONE (OUTPATIENT)
Dept: PRIMARY CARE | Facility: CLINIC | Age: 78
End: 2023-11-30

## 2023-11-30 DIAGNOSIS — I82.462 ACUTE DEEP VEIN THROMBOSIS (DVT) OF CALF MUSCLE VEIN OF LEFT LOWER EXTREMITY (MULTI): Primary | ICD-10-CM

## 2023-11-30 DIAGNOSIS — I43 CARDIOMYOPATHY, HYPERTENSIVE, BENIGN, WITHOUT HEART FAILURE (MULTI): ICD-10-CM

## 2023-11-30 DIAGNOSIS — K51.919 ULCERATIVE COLITIS WITH COMPLICATION, UNSPECIFIED LOCATION (MULTI): ICD-10-CM

## 2023-11-30 DIAGNOSIS — I69.354 HEMIPLEGIA AND HEMIPARESIS FOLLOWING CEREBRAL INFARCTION AFFECTING LEFT NON-DOMINANT SIDE (MULTI): ICD-10-CM

## 2023-11-30 DIAGNOSIS — I63.139 CEREBROVASCULAR ACCIDENT (CVA) DUE TO EMBOLISM OF CAROTID ARTERY, UNSPECIFIED BLOOD VESSEL LATERALITY (MULTI): ICD-10-CM

## 2023-11-30 DIAGNOSIS — I11.9 CARDIOMYOPATHY, HYPERTENSIVE, BENIGN, WITHOUT HEART FAILURE (MULTI): ICD-10-CM

## 2023-11-30 PROCEDURE — 99349 HOME/RES VST EST MOD MDM 40: CPT | Performed by: EMERGENCY MEDICINE

## 2023-11-30 NOTE — TELEPHONE ENCOUNTER
Pt called and states that she has c-diff again. Pt states that her ankles are swelling and that the medication that she is taking is going right through her. Pt would like to know what she should do.

## 2023-12-03 NOTE — PROGRESS NOTES
Janice Ko   78 y.o.  female  @location@            Assessment and Plan:  Patient is a long-term resident of [facility] living    77-year-old    Accidental fall  Communicated fracture of distal radius  Ulnar styloid fracture  Left hip intertrochanteric fracture  S/p CVA with baseline weakness  Hypertension  Ulcerative colitis      s/p L hip Cephallomedullary nail for intertrochanteric femur fracture  Pain control  Patient was transfused for low hemoglobin-  H&H is stable since then  Blood pressure is low-hold antihypertensives  Wrist has been splinted  Continue baseline meds  GI due to prophylaxis  OT PT eval after likely surgery    ascorbic acid 500 mg oral tablet 500 mg = 1 tabs, ORAL, DAILY  aspirin 81 mg oral delayed release tablet 81 mg = 1 tabs, ORAL, DAILY  CoQ10 100 mg oral capsule 200 mg = 2 caps, ORAL, DAILY  Crestor 20 mg oral tablet 20 mg = 1 tabs, ORAL, DAILY  ferrous sulfate 325 mg (65 mg elemental iron) oral tablet 325 mg = 1 tabs, ORAL, EVERY OTHER DAY  lactobacillus acidophilus = Floranex, Florajen, Lactinex Granules 1 packets, ORAL, DAILY  losartan 50 mg oral tablet 50 mg = 1 tabs, ORAL, BID  multivitamin 1 tabs, ORAL, DAILY  Norvasc 10 mg oral tablet 5 mg = 0.5 tabs, ORAL, DAILY  Pentasa 500 mg oral capsule, extended release 1,000 mg = 2 caps, ORAL, TID  Ultram 50 mg oral tablet 100 mg = 2 tabs, PRN, ORAL, [id number]  Vitamin D3 25 mcg (1000 intl units) oral capsule 25 mcg = 1 caps, ORAL, DAILY    Provide safe environment for the patient     Continue current medication regimen     OT PT and speech therapy     Bowel and bladder,skin care     Nutritional support     monitor and treat blood glucose     GI  and DVT prophylaxis     PRN medications     Periodic lab work    Regular Follow up    Charting is done using voice recognition software and may contain errors which have not been completely corrected          Source of history: Nurse, Medical personnel, Medical record, Patient.  History  limitation: None.    HPI:    Patient is unable to give detailed history and therefore history is obtained from the chart    History from prior hospitalization-    77-year-old who resides at a assisted living facility with past history of hypertension, s/p CVA, ulcerative colitis had accidental fall at the facility patient complained of left wrist pain and left hip pain  Denied any loss of consciousness or neck pain  She is baseline left-sided weakness secondary to CVA  In the ER she was found to have left hip fracture, left distal radial fracture and ulnar styloid process fracture was admitted for further care      Histories   Past Medical History: Past Medical HistoryNo qualifying data available.      Family History: Son: Ulcerative colitis..      Procedure history: Colonoscopy.: 2018  Cataract: 2014  History of Biopsy Breast Open  x 2      Social History        Social & Psychosocial History  Social History  Alcohol    Current, Beer, Wine, 3-5 times per week    Exercise  Regular exercise    Other      Comment: G4 SVDx4 (09/23/2020 11:34 - Diane Mullins)    Sexual  No Sexual Activity    Substance Abuse  Denies Substance Abuse    Tobacco  Denies Tobacco Use  Never (less than 100 in lifetime) Tobacco Use:.    Psychosocial History   No active psychosocial history has been recorded  .        Health Status      Allergies (4) Active Reaction  EPINEPHrine None Documented  erythromycin None Documented  sulfa drugs None Documented  tetanus toxoid temperature, swelling    Active Problems (46)  Acute deep venous thrombosis of calf..   Anemia   Anemia   Anxiety   Anxiety   Arthritis   Arthritis   At risk for falls   Atypical chest pain   Benign essential hypertension   Blood Products Refused   Blood Products Refused   Cerebrovascular accident   COVID-19   Cystocele   Decreased body mass index   Delay when starting to pass urine   Difficulty walking   Electrocardiogram abnormal   Finding of functional performance and  activity   Gastroesophageal reflux disease   Hemiparesis as late effect of cerebrovascular accident   History of anemia   History of chest pain   Hyperlipidemia   Hypertension   Hypertensive disorder   Increased frequency of urination   Iron deficiency anemia   Left lower quadrant pain   Leukocytosis   Loss of hair   Mitral valve regurgitation   Palpitations   Paroxysmal supraventricular tachycardia   Patient encounter status   Seasonal allergic rhinitis   Tick bite   Ulcerative colitis   Ulcerative colitis   Urinary hesitancy   Uterine prolapse   Uterine prolapse   Vaginal bleeding.   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 do not exceed 4,000mg a day      amLODIPine (Norvasc) 10 mg tablet Take 0.5 tablets (5 mg) by mouth once daily.      amLODIPine (Norvasc) 5 mg tablet       ascorbic acid (Vitamin C) 500 mg tablet Take 1 tablet (500 mg) by mouth once daily.      aspirin 81 mg EC tablet Take 1 tablet (81 mg) by mouth once daily.      CALCIUM 26-VIT D3-MAGNESIUM 15 ORAL       calcium carbonate 600 mg calcium (1,500 mg) tablet       cholecalciferol (Vitamin D-3) 25 MCG (1000 UT) tablet Take 1 tablet (1,000 Units) by mouth once daily.      coenzyme Q-10 100 mg capsule Take by mouth.      COQ10, LIPOSOMAL UBIQUINOL, ORAL       enoxaparin (Lovenox) 30 mg/0.3 mL syringe       fenofibrate (Fenoglide) 120 mg tablet Take 1 tablet (120 mg) by mouth once daily.      ferrous sulfate 325 (65 Fe) MG tablet Take 1 tablet (325 mg) by mouth every other day.      folic acid (Folvite) 1 mg tablet Take 1 tablet (1 mg) by mouth once daily.      L.acid,ferm,ricardo,rha-B.bif,long (Controlled Delivery Probiotic) 126 mg (2 billion cell) tablet,delayed and ext.release Take 1 tablet by mouth once daily.      lactobacillus  acidophilus (Lactinex) 100 million cell packet Take by mouth.      LACTOBACILLUS ACIDOPHILUS ORAL Take 1 packet by mouth twice a day.      losartan (Cozaar) 50 mg tablet Take by mouth.      melatonin 5 mg tablet Take 1 tablet (5 mg) by mouth once daily at bedtime.      mesalamine (Pentasa) 500 mg ER capsule Take 2 capsules (1,000 mg) by mouth in the morning and 2 capsules (1,000 mg) at noon and 2 capsules (1,000 mg) in the evening and 2 capsules (1,000 mg) before bedtime.      multivitamin tablet Take 1 tablet by mouth once daily.      pedi multivit no.17 w-fluoride (Poly-Vi-Mariel) 0.5 mg tablet,chewable Chew 1 tablet once daily.      polyethylene glycol (Glycolax, Miralax) 17 gram/dose powder Take 17 g by mouth if needed.      Probiotic, S.boulardii, 250 mg capsule       rosuvastatin (Crestor) 20 mg tablet Take 1 tablet (20 mg) by mouth once daily.      zinc amino acid chelate 50 mg tablet Take 1 tablet (50 mg) by mouth once daily.       No current facility-administered medications for this visit.       Physical Exam:  Vital signs as per nursing/MA documentation were reviewed  General appearance: Alert and in no acute distress  HEENT: Normal Inspection  Neck - Normal Inspection  Respiratory : No respiratory distress. Lungs are clear   Cardiovascular: heart rate normal. No gallop  Back - normal inspection  Skin inspection:Warm  Musculoskeletal : No deformities  Neuro : Limited exam. Baseline    ROS: Review of symptoms is negative except for what is mentioned in HPI    Results/Data  Records including but not limited to electronic medical records, relevant lab work and imaging from patient's health care facility encounter were reviewed and independently verified      Charting was completed using voice recognition technology and may include unintended errors.    Discussed with patient/family, RN, and NP.

## 2023-12-11 ENCOUNTER — PATIENT OUTREACH (OUTPATIENT)
Dept: CARE COORDINATION | Facility: CLINIC | Age: 78
End: 2023-12-11
Payer: MEDICARE

## 2023-12-11 RX ORDER — VANCOMYCIN HYDROCHLORIDE 25 MG/ML
125 KIT ORAL 4 TIMES DAILY
COMMUNITY
End: 2024-01-16 | Stop reason: ALTCHOICE

## 2023-12-11 RX ORDER — PREDNISONE 5 MG/1
5 TABLET ORAL DAILY
COMMUNITY
End: 2024-02-27 | Stop reason: HOSPADM

## 2023-12-11 NOTE — PROGRESS NOTES
Discharge Facility:[facility]  Discharge Diagnosis:Ulcerative Colitis  Admission Date:12/1/2023  Discharge Date: 12/9/2023    PCP Appointment Date: 12/15/2023  Specialist Appointment Date: 12/29/2023 Dr Guevara/Colorectal Surgeon  Hospital Encounter and Summary:  not available at this time   See discharge assessment below for further details  Engagement  Call Start Time: 1515 (12/11/2023  3:18 PM)    Medications  Medications reviewed with patient/caregiver?: Yes (Culturelle qd, Prednisone taper, Vancomycin 25 mg/ml 5 ml qid x 19 doses) (12/11/2023  3:18 PM)  Is the patient having any side effects they believe may be caused by any medication additions or changes?: No (12/11/2023  3:18 PM)  Does the patient have all medications ordered at discharge?: Yes (12/11/2023  3:18 PM)  Care Management Interventions: No intervention needed (12/11/2023  3:18 PM)  Is the patient taking all medications as directed (includes completed medication regime)?: Yes (12/11/2023  3:18 PM)    Appointments  Does the patient have a primary care provider?: Yes (12/11/2023  3:18 PM)  Care Management Interventions: Verified appointment date/time/provider (12/15/2023) (12/11/2023  3:18 PM)  Has the patient kept scheduled appointments due by today?: Yes (12/11/2023  3:18 PM)    Patient Teaching  Does the patient have access to their discharge instructions?: Yes (12/11/2023  3:18 PM)  What is the patient's perception of their health status since discharge?: Improving (12/11/2023  3:18 PM)  Is the patient/caregiver able to teach back the hierarchy of who to call/visit for symptoms/problems? PCP, Specialist, Home Health nurse, Urgent Care, ED, 911: Yes (12/11/2023  3:18 PM)    Wrap Up  Wrap Up Additional Comments: -- (Spoke to Janice's son. She lives at [facility] in [place]. He states her symptoms had been going on for long while. She is improving slowly. Her appetite has improved. She will fu with PCP and GI.) (12/11/2023  3:18 PM)

## 2023-12-15 ENCOUNTER — TELEPHONE (OUTPATIENT)
Dept: PRIMARY CARE | Facility: CLINIC | Age: 78
End: 2023-12-15

## 2023-12-15 ENCOUNTER — APPOINTMENT (OUTPATIENT)
Dept: PRIMARY CARE | Facility: CLINIC | Age: 78
End: 2023-12-15
Payer: MEDICARE

## 2023-12-15 PROBLEM — K51.919 ULCERATIVE COLITIS WITH COMPLICATION (MULTI): Status: ACTIVE | Noted: 2023-12-01

## 2023-12-15 PROBLEM — J44.9 CHRONIC OBSTRUCTIVE LUNG DISEASE (MULTI): Status: ACTIVE | Noted: 2023-04-03

## 2023-12-15 PROBLEM — E87.6 HYPOKALEMIA: Status: ACTIVE | Noted: 2023-12-02

## 2023-12-15 PROBLEM — Z86.19 HISTORY OF CLOSTRIDIUM DIFFICILE INFECTION: Status: ACTIVE | Noted: 2023-12-08

## 2023-12-15 PROBLEM — N81.10 FEMALE CYSTOCELE: Status: ACTIVE | Noted: 2020-08-19

## 2023-12-15 PROBLEM — R19.7 ACUTE DIARRHEA: Status: ACTIVE | Noted: 2023-11-19

## 2023-12-15 PROBLEM — I83.10 VARICOSE VEINS OF LOWER EXTREMITY WITH INFLAMMATION: Status: ACTIVE | Noted: 2023-12-15

## 2023-12-15 PROBLEM — A04.72 C. DIFFICILE COLITIS: Status: ACTIVE | Noted: 2023-12-03

## 2023-12-15 PROBLEM — R62.7 FAILURE TO THRIVE IN ADULT: Status: ACTIVE | Noted: 2023-12-01

## 2023-12-15 PROBLEM — R63.4 WEIGHT LOSS, UNINTENTIONAL: Status: ACTIVE | Noted: 2023-12-01

## 2023-12-15 PROBLEM — K92.1 BLOODY STOOLS: Status: ACTIVE | Noted: 2023-11-19

## 2023-12-15 PROBLEM — R19.7 DIARRHEA OF PRESUMED INFECTIOUS ORIGIN: Status: ACTIVE | Noted: 2023-12-01

## 2023-12-15 PROBLEM — E43 SEVERE PROTEIN-CALORIE MALNUTRITION (MULTI): Status: ACTIVE | Noted: 2023-12-02

## 2023-12-15 RX ORDER — VANCOMYCIN HYDROCHLORIDE 125 MG/1
CAPSULE ORAL
COMMUNITY
Start: 2023-10-19 | End: 2024-01-16 | Stop reason: ALTCHOICE

## 2023-12-15 RX ORDER — VANCOMYCIN HYDROCHLORIDE 1 G/20ML
INJECTION, POWDER, LYOPHILIZED, FOR SOLUTION INTRAVENOUS
COMMUNITY
Start: 2023-12-09 | End: 2024-01-16 | Stop reason: ALTCHOICE

## 2023-12-15 RX ORDER — HYDROCODONE BITARTRATE AND ACETAMINOPHEN 5; 325 MG/1; MG/1
TABLET ORAL
COMMUNITY
Start: 2023-08-22 | End: 2024-01-16 | Stop reason: WASHOUT

## 2023-12-15 NOTE — TELEPHONE ENCOUNTER
Pt was in [facility] for 6 days & needed hospital follow up.  Anything before  Jan?  She also needs BW done for cholesterol -Dr. Perez mentioned.   Rehab changed her cholesterol med to femofibride from crestor.  Please advise.  Ty  [phone]  [facility]

## 2023-12-18 ENCOUNTER — PROCEDURE VISIT (OUTPATIENT)
Dept: OBSTETRICS AND GYNECOLOGY | Facility: CLINIC | Age: 78
End: 2023-12-18
Payer: MEDICARE

## 2023-12-18 VITALS
WEIGHT: 85.3 LBS | HEIGHT: 63 IN | DIASTOLIC BLOOD PRESSURE: 62 MMHG | SYSTOLIC BLOOD PRESSURE: 130 MMHG | BODY MASS INDEX: 15.11 KG/M2

## 2023-12-18 DIAGNOSIS — N81.6 CYSTOCELE WITH RECTOCELE: Primary | ICD-10-CM

## 2023-12-18 DIAGNOSIS — N81.10 CYSTOCELE WITH RECTOCELE: Primary | ICD-10-CM

## 2023-12-18 PROCEDURE — 99213 OFFICE O/P EST LOW 20 MIN: CPT | Performed by: OBSTETRICS & GYNECOLOGY

## 2023-12-18 NOTE — PROGRESS NOTES
"Patient presents for pessary check.    77-year-old  4 para 4 with known history of cystorectocele with ring with pessary in place.  Patient recently hospitalized for a severe bout of colitis.  Currently doing better.  States occasionally notices a little bit of discharge and odor.  Denies any pelvic pain.    /62 (BP Location: Right arm, Patient Position: Sitting)   Ht 1.6 m (5' 3\")   Wt (!) 38.7 kg (85 lb 4.8 oz)   BMI 15.11 kg/m²       Pelvic: External genitalia atrophic, vagina pessary removed and cleaned.  Speculum placed in the vagina no vaginal lesions noted.  Pessary replaced.    Assessment and plan: Cystorectocele, normal pessary check, patient will follow-up in 3 months.  "

## 2023-12-28 ENCOUNTER — APPOINTMENT (OUTPATIENT)
Dept: PRIMARY CARE | Facility: CLINIC | Age: 78
End: 2023-12-28
Payer: MEDICARE

## 2024-01-10 ENCOUNTER — PATIENT OUTREACH (OUTPATIENT)
Dept: PRIMARY CARE | Facility: CLINIC | Age: 79
End: 2024-01-10
Payer: MEDICARE

## 2024-01-10 NOTE — PROGRESS NOTES
Discharge Facility:[facility]  Discharge Diagnosis:Ulcerative colitis, Clostridium difficile colitis (recurrent), volume fluid overload    Admission Date:12/26/2023  Discharge Date: 1/9/2024    PCP Appointment Date:1/16/2024  Specialist Appointment Date: 1/24/2024 [facility]  3/4/2024 /[facility] Encounter and Summary:  not available at this time   See discharge assessment below for further details  Engagement  Call Start Time: 1614 (1/10/2024  4:23 PM)    Medications  Medications reviewed with patient/caregiver?: Yes (1/10/2024  4:23 PM)  Is the patient having any side effects they believe may be caused by any medication additions or changes?: No (1/10/2024  4:23 PM)  Does the patient have all medications ordered at discharge?: Yes (1/10/2024  4:23 PM)  Care Management Interventions: No intervention needed (1/10/2024  4:23 PM)  Prescription Comments: -- (New:Prednisone 5mg 5 tabs x 7 days, 3 tabs x 7 days, 1 tab x 7 days. Rifampin was dc d/t increase in liver enzymes. Awaiting for when to start on Entyvio) (1/10/2024  4:23 PM)  Is the patient taking all medications as directed (includes completed medication regime)?: Yes (1/10/2024  4:23 PM)    Appointments  Does the patient have a primary care provider?: Yes (1/10/2024  4:23 PM)  Care Management Interventions: Verified appointment date/time/provider (1/16/2024) (1/10/2024  4:23 PM)  Has the patient kept scheduled appointments due by today?: Yes (1/10/2024  4:23 PM)    Self Management  What is the home health agency?: -- (N/A Resides at [facility]) (1/10/2024  4:23 PM)  What Durable Medical Equipment (DME) was ordered?: -- (N/A) (1/10/2024  4:23 PM)    Patient Teaching  Does the patient have access to their discharge instructions?: Yes (1/10/2024  4:23 PM)  What is the patient's perception of their health status since discharge?: Improving (1/10/2024  4:23 PM)  Is the patient/caregiver able to teach back the hierarchy of who to call/visit for symptoms/problems?  Pt called to schedule colonoscopy, no order as of yet. She will see Dr Wong on Wednesday and she will have the office fax an order.   PCP, Specialist, Home Health nurse, Urgent Care, ED, 911: Yes (1/10/2024  4:23 PM)    Wrap Up  Wrap Up Additional Comments: -- (Spoke with daughter Chely. She states Janice has been hungry, which is good effect of steroid. She has her fu appointments with specialists. She will contact provider if any concerns before appointments.) (1/10/2024  4:23 PM)

## 2024-01-16 ENCOUNTER — PATIENT OUTREACH (OUTPATIENT)
Dept: PRIMARY CARE | Facility: CLINIC | Age: 79
End: 2024-01-16

## 2024-01-16 ENCOUNTER — OFFICE VISIT (OUTPATIENT)
Dept: PRIMARY CARE | Facility: CLINIC | Age: 79
End: 2024-01-16
Payer: MEDICARE

## 2024-01-16 VITALS
OXYGEN SATURATION: 98 % | HEART RATE: 110 BPM | SYSTOLIC BLOOD PRESSURE: 134 MMHG | WEIGHT: 87.8 LBS | TEMPERATURE: 97.7 F | BODY MASS INDEX: 17.24 KG/M2 | DIASTOLIC BLOOD PRESSURE: 82 MMHG | HEIGHT: 60 IN

## 2024-01-16 DIAGNOSIS — K52.9 COLITIS: ICD-10-CM

## 2024-01-16 DIAGNOSIS — E78.00 PURE HYPERCHOLESTEROLEMIA: ICD-10-CM

## 2024-01-16 DIAGNOSIS — I10 BENIGN ESSENTIAL HYPERTENSION: Primary | ICD-10-CM

## 2024-01-16 DIAGNOSIS — D50.9 IRON DEFICIENCY ANEMIA, UNSPECIFIED IRON DEFICIENCY ANEMIA TYPE: ICD-10-CM

## 2024-01-16 DIAGNOSIS — E43 SEVERE PROTEIN-CALORIE MALNUTRITION (MULTI): ICD-10-CM

## 2024-01-16 PROCEDURE — 99496 TRANSJ CARE MGMT HIGH F2F 7D: CPT | Performed by: FAMILY MEDICINE

## 2024-01-16 PROCEDURE — 3075F SYST BP GE 130 - 139MM HG: CPT | Performed by: FAMILY MEDICINE

## 2024-01-16 PROCEDURE — 1036F TOBACCO NON-USER: CPT | Performed by: FAMILY MEDICINE

## 2024-01-16 PROCEDURE — 90677 PCV20 VACCINE IM: CPT | Performed by: FAMILY MEDICINE

## 2024-01-16 PROCEDURE — 1126F AMNT PAIN NOTED NONE PRSNT: CPT | Performed by: FAMILY MEDICINE

## 2024-01-16 PROCEDURE — G0009 ADMIN PNEUMOCOCCAL VACCINE: HCPCS | Performed by: FAMILY MEDICINE

## 2024-01-16 PROCEDURE — 1159F MED LIST DOCD IN RCRD: CPT | Performed by: FAMILY MEDICINE

## 2024-01-16 PROCEDURE — 3079F DIAST BP 80-89 MM HG: CPT | Performed by: FAMILY MEDICINE

## 2024-01-16 RX ORDER — FERROUS SULFATE 325(65) MG
325 TABLET ORAL
Qty: 90 TABLET | Refills: 3 | Status: SHIPPED | OUTPATIENT
Start: 2024-01-16 | End: 2025-01-15

## 2024-01-16 RX ORDER — VEDOLIZUMAB 300 MG/5ML
INJECTION, POWDER, LYOPHILIZED, FOR SOLUTION INTRAVENOUS
COMMUNITY
Start: 2024-01-02 | End: 2024-01-16

## 2024-01-16 RX ORDER — LACTOBACILLUS RHAMNOSUS GG 10B CELL
CAPSULE ORAL
COMMUNITY
Start: 2024-01-13

## 2024-01-16 RX ORDER — PANTOPRAZOLE SODIUM 40 MG/1
40 TABLET, DELAYED RELEASE ORAL
COMMUNITY
Start: 2024-01-16 | End: 2024-01-16 | Stop reason: WASHOUT

## 2024-01-16 ASSESSMENT — ENCOUNTER SYMPTOMS
PSYCHIATRIC NEGATIVE: 1
CARDIOVASCULAR NEGATIVE: 1
ROS GI COMMENTS: COLITIS
LOSS OF SENSATION IN FEET: 0
NEUROLOGICAL NEGATIVE: 1
ARTHRALGIAS: 1
DEPRESSION: 0
RESPIRATORY NEGATIVE: 1
OCCASIONAL FEELINGS OF UNSTEADINESS: 0
GASTROINTESTINAL NEGATIVE: 1
FATIGUE: 1

## 2024-01-16 ASSESSMENT — PAIN SCALES - GENERAL: PAINLEVEL: 0-NO PAIN

## 2024-01-16 NOTE — PROGRESS NOTES
Subjective   Patient ID: Janice Ko is a 78 y.o. female who presents for Hospital Follow-up ([facility] er- c diff , loss of weight , hga1c- 7.3 ).    HPI     Review of Systems   Constitutional:  Positive for fatigue.   Respiratory: Negative.     Cardiovascular: Negative.    Gastrointestinal: Negative.         Colitis   Genitourinary: Negative.    Musculoskeletal:  Positive for arthralgias.   Neurological: Negative.    Hematological:         Iron deficiency anemia   Psychiatric/Behavioral: Negative.         Objective   /82 (BP Location: Right arm)   Pulse 110   Temp 36.5 °C (97.7 °F) (Temporal)   Ht 1.524 m (5')   Wt (!) 39.8 kg (87 lb 12.8 oz)   SpO2 98%   BMI 17.15 kg/m²     Physical Exam  Vitals and nursing note reviewed.   Constitutional:       Appearance: Normal appearance.   Cardiovascular:      Rate and Rhythm: Regular rhythm.      Heart sounds: Normal heart sounds.   Pulmonary:      Breath sounds: Normal breath sounds.   Abdominal:      Palpations: Abdomen is soft.   Neurological:      General: No focal deficit present.      Mental Status: She is alert and oriented to person, place, and time.   Psychiatric:         Mood and Affect: Mood normal.         Behavior: Behavior normal.         Assessment/Plan patient seen here to follow-up after having been admitted with colitis.  We reviewed her medications pre and post admission.  She is feeling better at this time we are starting her back on her iron once a day.  I will see her back as needed.  Problem List Items Addressed This Visit             ICD-10-CM    Benign essential hypertension - Primary I10    Colitis K52.9    Relevant Orders    Disability Placard    Iron deficiency anemia D50.9    Relevant Medications    ferrous sulfate, 325 mg ferrous sulfate, (FerrouSuL) tablet    Pure hypercholesterolemia E78.00    Severe protein-calorie malnutrition (CMS/HCC) E43           No

## 2024-01-16 NOTE — PROGRESS NOTES
Discharge Facility:[facility]  Discharge Diagnosis:  Gastrointestinal hemorrhage, unspecified gastrointestinal hemorrhage type  Anemia, unspecified type  Other ulcerative colitis with complication   TB lung, latent    Admission Date:1/15/2024  Discharge Date: 1/15/2024    PCP Appointment Date:1/16/2024  Specialist Appointment Date: 3/4/2024 GI/[facility], 3/22/2024 Cardio/[facility]  Hospital Encounter and Summary:  not available at this time

## 2024-01-18 ENCOUNTER — TELEPHONE (OUTPATIENT)
Dept: CARDIOLOGY | Facility: CLINIC | Age: 79
End: 2024-01-18
Payer: MEDICARE

## 2024-01-18 NOTE — TELEPHONE ENCOUNTER
Patient's norvasc was discontinued and she is unsure who did that. She was recently in the hospital with colitis at [facility]. Should she remain on this Rx or discontinue? Please fax the okay to Andressa [phone]. Unsure if she gave the correct phone number she may call back to verify.

## 2024-01-18 NOTE — TELEPHONE ENCOUNTER
Spoke to patient. Patient is unsure of when she was last taking Norvasc. Patient has had multiple admissions since last OV in September, most recent at [facility]. Norvasc not on discharge summary. Advised patient to make follow up visit with Dr. Perez. Patient to call office back to schedule appt once she speaks to daughter.

## 2024-01-19 ENCOUNTER — PATIENT OUTREACH (OUTPATIENT)
Dept: PRIMARY CARE | Facility: CLINIC | Age: 79
End: 2024-01-19
Payer: MEDICARE

## 2024-01-19 NOTE — PROGRESS NOTES
Call regarding appt. with PCP on 1/16/2024 after hospitalization.  At time of outreach call the patient feels as if their condition has improved since last visit.  Reviewed the PCP appointment with the pt and addressed any questions or concerns.  Spoke to Chely. PCP visit went well, she has some questions regarding meds on dc's we reviewed. Sounds like meds are all good now. She will contact if any questions.

## 2024-01-22 ENCOUNTER — TELEPHONE (OUTPATIENT)
Dept: PRIMARY CARE | Facility: CLINIC | Age: 79
End: 2024-01-22
Payer: MEDICARE

## 2024-01-22 DIAGNOSIS — K52.9 COLITIS: Primary | ICD-10-CM

## 2024-01-22 NOTE — TELEPHONE ENCOUNTER
Pt daughter called and states that the pt is requesting an order for her hemoglobin to get tested again due to having dark stools.

## 2024-01-25 ENCOUNTER — HOME VISIT (OUTPATIENT)
Dept: POST ACUTE CARE | Facility: EXTERNAL LOCATION | Age: 79
End: 2024-01-25
Payer: MEDICARE

## 2024-01-25 DIAGNOSIS — K51.919 ULCERATIVE COLITIS WITH COMPLICATION, UNSPECIFIED LOCATION (MULTI): ICD-10-CM

## 2024-01-25 DIAGNOSIS — E43 SEVERE PROTEIN-CALORIE MALNUTRITION (MULTI): Primary | ICD-10-CM

## 2024-01-25 DIAGNOSIS — J44.9 CHRONIC OBSTRUCTIVE PULMONARY DISEASE, UNSPECIFIED COPD TYPE (MULTI): ICD-10-CM

## 2024-01-25 DIAGNOSIS — I82.462 ACUTE DEEP VEIN THROMBOSIS (DVT) OF CALF MUSCLE VEIN OF LEFT LOWER EXTREMITY (MULTI): ICD-10-CM

## 2024-01-25 PROCEDURE — 99349 HOME/RES VST EST MOD MDM 40: CPT | Performed by: EMERGENCY MEDICINE

## 2024-02-01 ENCOUNTER — TELEPHONE (OUTPATIENT)
Dept: PRIMARY CARE | Facility: CLINIC | Age: 79
End: 2024-02-01
Payer: MEDICARE

## 2024-02-01 NOTE — PROGRESS NOTES
Janice Ko   78 y.o.  female  @location@            Assessment and Plan:  Patient is a long-term resident of [facility] assisted living    77-year-old    Accidental fall  Communicated fracture of distal radius  Ulnar styloid fracture  Left hip intertrochanteric fracture  S/p CVA with baseline weakness  Hypertension  Ulcerative colitis      s/p L hip Cephallomedullary nail for intertrochanteric femur fracture  Pain control  Patient was transfused for low hemoglobin-  H&H is stable since then  Blood pressure is low-hold antihypertensives  Wrist has been splinted  Continue baseline meds  GI due to prophylaxis  OT PT eval after likely surgery    ascorbic acid 500 mg oral tablet 500 mg = 1 tabs, ORAL, DAILY  aspirin 81 mg oral delayed release tablet 81 mg = 1 tabs, ORAL, DAILY  CoQ10 100 mg oral capsule 200 mg = 2 caps, ORAL, DAILY  Crestor 20 mg oral tablet 20 mg = 1 tabs, ORAL, DAILY  ferrous sulfate 325 mg (65 mg elemental iron) oral tablet 325 mg = 1 tabs, ORAL, EVERY OTHER DAY  lactobacillus acidophilus = Floranex, Florajen, Lactinex Granules 1 packets, ORAL, DAILY  losartan 50 mg oral tablet 50 mg = 1 tabs, ORAL, BID  multivitamin 1 tabs, ORAL, DAILY  Norvasc 10 mg oral tablet 5 mg = 0.5 tabs, ORAL, DAILY  Pentasa 500 mg oral capsule, extended release 1,000 mg = 2 caps, ORAL, TID  Ultram 50 mg oral tablet 100 mg = 2 tabs, PRN, ORAL, [id number]  Vitamin D3 25 mcg (1000 intl units) oral capsule 25 mcg = 1 caps, ORAL, DAILY    Rx list reviewed.   PT and OT evaluation is in the process.   Routine safety measures, fall precautions, risk modification and alarm placement if needed for prevention of falls.   Skin care precautions, prevention of pressures sores at pressure points assessed.   Pt needs to be monitored frequently by nursing staff particularly at night time.   Any confusion, agitation or behavioural disturbance needs to be attended, as per home policy   rapid covid Ag assay need to be done, notify if positive.    If needed appropriate measures to be taken for alarm placements and assisted devices, pt was told not to get up and ambulate at night unless help and assist available at bedside,   labs will be done as per our routine protocol.   PO intake need to be monitored if consuming po.       Charting is done using voice recognition software and may contain errors which have not been completely corrected            Source of history: Nurse, Medical personnel, Medical record, Patient.  History limitation: None.    HPI:    Patient is unable to give detailed history and therefore history is obtained from the chart    History from prior hospitalization-    77-year-old who resides at a assisted living facility with past history of hypertension, s/p CVA, ulcerative colitis had accidental fall at the facility patient complained of left wrist pain and left hip pain  Denied any loss of consciousness or neck pain  She is baseline left-sided weakness secondary to CVA  In the ER she was found to have left hip fracture, left distal radial fracture and ulnar styloid process fracture was admitted for further care      Histories   Past Medical History: Past Medical HistoryNo qualifying data available.      Family History: Son: Ulcerative colitis..      Procedure history: Colonoscopy.: 2018  Cataract: 2014  History of Biopsy Breast Open  x 2      Social History        Social & Psychosocial History  Social History  Alcohol    Current, Beer, Wine, 3-5 times per week    Exercise  Regular exercise    Other      Comment: G4 SVDx4 (09/23/2020 11:34 - Diane Mlulins)    Sexual  No Sexual Activity    Substance Abuse  Denies Substance Abuse    Tobacco  Denies Tobacco Use  Never (less than 100 in lifetime) Tobacco Use:.    Psychosocial History   No active psychosocial history has been recorded  .        Health Status      Allergies (4) Active Reaction  EPINEPHrine None Documented  erythromycin None Documented  sulfa drugs None  Documented  tetanus toxoid temperature, swelling    Active Problems (46)  Acute deep venous thrombosis of calf..   Anemia   Anemia   Anxiety   Anxiety   Arthritis   Arthritis   At risk for falls   Atypical chest pain   Benign essential hypertension   Blood Products Refused   Blood Products Refused   Cerebrovascular accident   COVID-19   Cystocele   Decreased body mass index   Delay when starting to pass urine   Difficulty walking   Electrocardiogram abnormal   Finding of functional performance and activity   Gastroesophageal reflux disease   Hemiparesis as late effect of cerebrovascular accident   History of anemia   History of chest pain   Hyperlipidemia   Hypertension   Hypertensive disorder   Increased frequency of urination   Iron deficiency anemia   Left lower quadrant pain   Leukocytosis   Loss of hair   Mitral valve regurgitation   Palpitations   Paroxysmal supraventricular tachycardia   Patient encounter status   Seasonal allergic rhinitis   Tick bite   Ulcerative colitis   Ulcerative colitis   Urinary hesitancy   Uterine prolapse   Uterine prolapse   Vaginal bleeding.   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      [facility] 10 billion cell -200 mg capsule, sprinkle       ferrous sulfate, 325 mg ferrous sulfate, (FerrouSuL) tablet Take 1 tablet by mouth once daily with breakfast. 90 tablet 3    losartan (Cozaar) 50 mg tablet Take by mouth.      multivitamin tablet Take 1 tablet by mouth once daily.      predniSONE (Deltasone) 5 mg tablet Take 1 tablet (5 mg) by mouth once daily.       No current facility-administered medications for this visit.       Physical Exam:  Vital signs as per nursing/MA documentation were reviewed  General appearance: Alert and in no acute distress  HEENT: Normal Inspection  Neck - Normal  Inspection  Respiratory : No respiratory distress. Lungs are clear   Cardiovascular: heart rate normal. No gallop  Back - normal inspection  Skin inspection:Warm  Musculoskeletal : No deformities  Neuro : Limited exam. Baseline    ROS: Review of symptoms is negative except for what is mentioned in HPI    Results/Data  Records including but not limited to electronic medical records, relevant lab work and imaging from patient's health care facility encounter were reviewed and independently verified      Charting was completed using voice recognition technology and may include unintended errors.    Discussed with patient/family, RN, and NP.

## 2024-02-05 ENCOUNTER — OFFICE VISIT (OUTPATIENT)
Dept: CARDIOLOGY | Facility: CLINIC | Age: 79
End: 2024-02-05
Payer: MEDICARE

## 2024-02-05 VITALS
WEIGHT: 82.6 LBS | BODY MASS INDEX: 16.22 KG/M2 | DIASTOLIC BLOOD PRESSURE: 78 MMHG | HEART RATE: 77 BPM | OXYGEN SATURATION: 99 % | SYSTOLIC BLOOD PRESSURE: 130 MMHG | HEIGHT: 60 IN

## 2024-02-05 DIAGNOSIS — Z22.7 TB LUNG, LATENT: ICD-10-CM

## 2024-02-05 DIAGNOSIS — I34.0 NONRHEUMATIC MITRAL VALVE REGURGITATION: Primary | ICD-10-CM

## 2024-02-05 DIAGNOSIS — J44.9 CHRONIC OBSTRUCTIVE PULMONARY DISEASE, UNSPECIFIED COPD TYPE (MULTI): ICD-10-CM

## 2024-02-05 DIAGNOSIS — I63.139 CEREBROVASCULAR ACCIDENT (CVA) DUE TO EMBOLISM OF CAROTID ARTERY, UNSPECIFIED BLOOD VESSEL LATERALITY (MULTI): ICD-10-CM

## 2024-02-05 DIAGNOSIS — I10 BENIGN ESSENTIAL HYPERTENSION: ICD-10-CM

## 2024-02-05 DIAGNOSIS — E78.5 HYPERLIPIDEMIA, UNSPECIFIED HYPERLIPIDEMIA TYPE: ICD-10-CM

## 2024-02-05 DIAGNOSIS — I82.462 ACUTE DEEP VEIN THROMBOSIS (DVT) OF CALF MUSCLE VEIN OF LEFT LOWER EXTREMITY (MULTI): ICD-10-CM

## 2024-02-05 DIAGNOSIS — B37.81 ESOPHAGEAL CANDIDIASIS (MULTI): ICD-10-CM

## 2024-02-05 DIAGNOSIS — K51.919 ULCERATIVE COLITIS WITH COMPLICATION, UNSPECIFIED LOCATION (MULTI): ICD-10-CM

## 2024-02-05 DIAGNOSIS — F41.8 SITUATIONAL ANXIETY: ICD-10-CM

## 2024-02-05 PROBLEM — R19.7 DIARRHEA: Status: ACTIVE | Noted: 2023-12-26

## 2024-02-05 PROBLEM — R79.89 ELEVATED LFTS: Status: ACTIVE | Noted: 2024-01-08

## 2024-02-05 PROBLEM — G81.94 LEFT HEMIPARESIS (MULTI): Status: ACTIVE | Noted: 2024-02-05

## 2024-02-05 PROBLEM — Z86.73 HISTORY OF CEREBROVASCULAR ACCIDENT: Status: ACTIVE | Noted: 2024-02-05

## 2024-02-05 PROCEDURE — 1160F RVW MEDS BY RX/DR IN RCRD: CPT | Performed by: INTERNAL MEDICINE

## 2024-02-05 PROCEDURE — 1036F TOBACCO NON-USER: CPT | Performed by: INTERNAL MEDICINE

## 2024-02-05 PROCEDURE — 99214 OFFICE O/P EST MOD 30 MIN: CPT | Performed by: INTERNAL MEDICINE

## 2024-02-05 PROCEDURE — 1159F MED LIST DOCD IN RCRD: CPT | Performed by: INTERNAL MEDICINE

## 2024-02-05 PROCEDURE — 3075F SYST BP GE 130 - 139MM HG: CPT | Performed by: INTERNAL MEDICINE

## 2024-02-05 PROCEDURE — 3078F DIAST BP <80 MM HG: CPT | Performed by: INTERNAL MEDICINE

## 2024-02-05 PROCEDURE — 1126F AMNT PAIN NOTED NONE PRSNT: CPT | Performed by: INTERNAL MEDICINE

## 2024-02-05 RX ORDER — PANTOPRAZOLE SODIUM 40 MG/1
TABLET, DELAYED RELEASE ORAL
COMMUNITY
Start: 2024-01-16 | End: 2024-04-04 | Stop reason: WASHOUT

## 2024-02-05 RX ORDER — CALCIUM CARBONATE/VITAMIN D3 250-3.125
TABLET ORAL
COMMUNITY

## 2024-02-05 NOTE — PROGRESS NOTES
Subjective  Janice Ko  is a 78 y.o. year old female who presents for 1/15/24 [facility] admission for UGI bleed from esophageal candidiasis.  No chest pain, no palpitations, no dyspnea    Blood pressure 130/78, pulse 77, height 1.524 m (5'), weight (!) 37.5 kg (82 lb 9.6 oz), SpO2 99 %.   Epinephrine, Erythromycin, Sulfa (sulfonamide antibiotics), and Tetanus immune globulin  Past Medical History:   Diagnosis Date    Personal history of other diseases of the circulatory system     History of mitral valve prolapse    Personal history of other specified conditions     History of chest pain    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     Varicose veins of lower extremities with inflammation     Past Surgical History:   Procedure Laterality Date    BREAST BIOPSY  07/09/2018    Biopsy Breast Open    CATARACT EXTRACTION  07/09/2018    Cataract Surgery    MR HEAD ANGIO WO IV CONTRAST  12/27/2022    MR HEAD ANGIO WO IV CONTRAST 12/27/2022 DOCTOR OFFICE LEGACY    MR NECK ANGIO WO IV CONTRAST  12/27/2022    MR NECK ANGIO WO IV CONTRAST 12/27/2022 DOCTOR OFFICE LEGAC[facility]     Family History   Problem Relation Name Age of Onset    Heart failure Mother      Hypertension Mother      Other (malignant neoplasm of breast) Mother      Other (cva) Father      Hypertension Father      Other (myocardial infarction) Father      Other (stroke-in-evolution syndrome) Father      Multiple sclerosis Sister      Hypertension Brother      Alzheimer's disease Mother's Sister       @SOC    Current Outpatient Medications   Medication Sig Dispense Refill    pantoprazole (ProtoNix) 40 mg EC tablet       aspirin 81 mg EC tablet Take 1 tablet (81 mg) by mouth once daily.      calcium carbonate-vitamin D3 (Oyster Shell) 250 mg-3.125 mcg (125 unit) tablet Take by mouth 2 times a day with meals.      [facility] 10 billion cell -200 mg capsule, sprinkle       ferrous sulfate, 325 mg ferrous sulfate, (FerrouSuL) tablet  Take 1 tablet by mouth once daily with breakfast. 90 tablet 3    losartan (Cozaar) 50 mg tablet Take by mouth.      multivitamin tablet Take 1 tablet by mouth once daily.      predniSONE (Deltasone) 5 mg tablet Take 1 tablet (5 mg) by mouth once daily.       No current facility-administered medications for this visit.        ROS  Review of Systems   All other systems reviewed and are negative.      Physical Exam  Physical Exam  Constitutional:       Appearance: Normal appearance.   HENT:      Head: Normocephalic and atraumatic.   Eyes:      Extraocular Movements: Extraocular movements intact.      Pupils: Pupils are equal, round, and reactive to light.   Cardiovascular:      Rate and Rhythm: Normal rate and regular rhythm.      Heart sounds: S1 normal and S2 normal.   Pulmonary:      Effort: Pulmonary effort is normal.      Breath sounds: Normal breath sounds.   Abdominal:      General: Abdomen is flat.      Palpations: Abdomen is soft.   Musculoskeletal:      Right lower leg: No edema.      Left lower leg: No edema.   Skin:     General: Skin is warm and dry.   Neurological:      Mental Status: She is alert and oriented to person, place, and time.          EKG  No results found for this or any previous visit (from the past 4464 hour(s)).    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

## 2024-02-14 PROBLEM — B37.81 ESOPHAGEAL CANDIDIASIS (MULTI): Status: ACTIVE | Noted: 2024-02-14

## 2024-02-14 NOTE — ASSESSMENT & PLAN NOTE
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

## 2024-02-22 ENCOUNTER — APPOINTMENT (OUTPATIENT)
Dept: RADIOLOGY | Facility: HOSPITAL | Age: 79
DRG: 385 | End: 2024-02-22
Payer: MEDICARE

## 2024-02-22 ENCOUNTER — TELEPHONE (OUTPATIENT)
Dept: PRIMARY CARE | Facility: CLINIC | Age: 79
End: 2024-02-22

## 2024-02-22 ENCOUNTER — APPOINTMENT (OUTPATIENT)
Dept: CARDIOLOGY | Facility: HOSPITAL | Age: 79
DRG: 385 | End: 2024-02-22
Payer: MEDICARE

## 2024-02-22 ENCOUNTER — HOSPITAL ENCOUNTER (INPATIENT)
Facility: HOSPITAL | Age: 79
LOS: 5 days | Discharge: HOME | DRG: 385 | End: 2024-02-27
Attending: STUDENT IN AN ORGANIZED HEALTH CARE EDUCATION/TRAINING PROGRAM | Admitting: INTERNAL MEDICINE
Payer: MEDICARE

## 2024-02-22 DIAGNOSIS — K92.1 MELENA: ICD-10-CM

## 2024-02-22 DIAGNOSIS — S22.31XA CLOSED FRACTURE OF ONE RIB OF RIGHT SIDE, INITIAL ENCOUNTER: ICD-10-CM

## 2024-02-22 DIAGNOSIS — K92.1 GASTROINTESTINAL HEMORRHAGE WITH MELENA: Primary | ICD-10-CM

## 2024-02-22 DIAGNOSIS — A04.72 C. DIFFICILE COLITIS: ICD-10-CM

## 2024-02-22 DIAGNOSIS — I26.99 PE (PULMONARY THROMBOEMBOLISM) (MULTI): ICD-10-CM
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
## 2024-02-22 LAB
ABO GROUP (TYPE) IN BLOOD: NORMAL
ABO GROUP (TYPE) IN BLOOD: NORMAL
ALBUMIN SERPL BCP-MCNC: 2.9 G/DL (ref 3.4–5)
ALP SERPL-CCNC: 88 U/L (ref 33–136)
ALT SERPL W P-5'-P-CCNC: 11 U/L (ref 7–45)
ANION GAP SERPL CALC-SCNC: 11 MMOL/L (ref 10–20)
ANTIBODY SCREEN: NORMAL
AST SERPL W P-5'-P-CCNC: 19 U/L (ref 9–39)
BASOPHILS # BLD AUTO: 0.07 X10*3/UL (ref 0–0.1)
BASOPHILS NFR BLD AUTO: 0.8 %
BILIRUB SERPL-MCNC: 0.1 MG/DL (ref 0–1.2)
BUN SERPL-MCNC: 20 MG/DL (ref 6–23)
CALCIUM SERPL-MCNC: 8.2 MG/DL (ref 8.6–10.3)
CARDIAC TROPONIN I PNL SERPL HS: 7 NG/L (ref 0–13)
CARDIAC TROPONIN I PNL SERPL HS: 7 NG/L (ref 0–13)
CHLORIDE SERPL-SCNC: 106 MMOL/L (ref 98–107)
CO2 SERPL-SCNC: 24 MMOL/L (ref 21–32)
CREAT SERPL-MCNC: 0.58 MG/DL (ref 0.5–1.05)
EGFRCR SERPLBLD CKD-EPI 2021: >90 ML/MIN/1.73M*2
EOSINOPHIL # BLD AUTO: 0.09 X10*3/UL (ref 0–0.4)
EOSINOPHIL NFR BLD AUTO: 1 %
ERYTHROCYTE [DISTWIDTH] IN BLOOD BY AUTOMATED COUNT: 14.1 % (ref 11.5–14.5)
GLUCOSE SERPL-MCNC: 95 MG/DL (ref 74–99)
HCT VFR BLD AUTO: 23.7 % (ref 36–46)
HEMOCCULT SP1 STL QL: POSITIVE
HGB BLD-MCNC: 6.9 G/DL (ref 12–16)
HOLD SPECIMEN: NORMAL
HOLD SPECIMEN: NORMAL
IMM GRANULOCYTES # BLD AUTO: 0.1 X10*3/UL (ref 0–0.5)
IMM GRANULOCYTES NFR BLD AUTO: 1.2 % (ref 0–0.9)
LYMPHOCYTES # BLD AUTO: 1.68 X10*3/UL (ref 0.8–3)
LYMPHOCYTES NFR BLD AUTO: 19.4 %
MCH RBC QN AUTO: 29.1 PG (ref 26–34)
MCHC RBC AUTO-ENTMCNC: 29.1 G/DL (ref 32–36)
MCV RBC AUTO: 100 FL (ref 80–100)
MONOCYTES # BLD AUTO: 0.6 X10*3/UL (ref 0.05–0.8)
MONOCYTES NFR BLD AUTO: 6.9 %
NEUTROPHILS # BLD AUTO: 6.13 X10*3/UL (ref 1.6–5.5)
NEUTROPHILS NFR BLD AUTO: 70.7 %
NRBC BLD-RTO: 0 /100 WBCS (ref 0–0)
OVALOCYTES BLD QL SMEAR: NORMAL
PLATELET # BLD AUTO: 599 X10*3/UL (ref 150–450)
POLYCHROMASIA BLD QL SMEAR: NORMAL
POTASSIUM SERPL-SCNC: 3.8 MMOL/L (ref 3.5–5.3)
PROT SERPL-MCNC: 5.6 G/DL (ref 6.4–8.2)
RBC # BLD AUTO: 2.37 X10*6/UL (ref 4–5.2)
RBC MORPH BLD: NORMAL
RH FACTOR (ANTIGEN D): NORMAL
RH FACTOR (ANTIGEN D): NORMAL
SODIUM SERPL-SCNC: 137 MMOL/L (ref 136–145)
WBC # BLD AUTO: 8.7 X10*3/UL (ref 4.4–11.3)

## 2024-02-22 PROCEDURE — 2550000001 HC RX 255 CONTRASTS: Performed by: STUDENT IN AN ORGANIZED HEALTH CARE EDUCATION/TRAINING PROGRAM

## 2024-02-22 PROCEDURE — 86078 PHYS BLOOD BANK SERV REACTJ: CPT | Performed by: INTERNAL MEDICINE

## 2024-02-22 PROCEDURE — 86920 COMPATIBILITY TEST SPIN: CPT

## 2024-02-22 PROCEDURE — 85025 COMPLETE CBC W/AUTO DIFF WBC: CPT | Performed by: STUDENT IN AN ORGANIZED HEALTH CARE EDUCATION/TRAINING PROGRAM

## 2024-02-22 PROCEDURE — 36415 COLL VENOUS BLD VENIPUNCTURE: CPT | Performed by: STUDENT IN AN ORGANIZED HEALTH CARE EDUCATION/TRAINING PROGRAM

## 2024-02-22 PROCEDURE — C9113 INJ PANTOPRAZOLE SODIUM, VIA: HCPCS | Performed by: STUDENT IN AN ORGANIZED HEALTH CARE EDUCATION/TRAINING PROGRAM

## 2024-02-22 PROCEDURE — 93005 ELECTROCARDIOGRAM TRACING: CPT

## 2024-02-22 PROCEDURE — 2500000001 HC RX 250 WO HCPCS SELF ADMINISTERED DRUGS (ALT 637 FOR MEDICARE OP): Performed by: STUDENT IN AN ORGANIZED HEALTH CARE EDUCATION/TRAINING PROGRAM

## 2024-02-22 PROCEDURE — 99285 EMERGENCY DEPT VISIT HI MDM: CPT | Mod: 25

## 2024-02-22 PROCEDURE — 86140 C-REACTIVE PROTEIN: CPT | Performed by: NURSE PRACTITIONER

## 2024-02-22 PROCEDURE — 83880 ASSAY OF NATRIURETIC PEPTIDE: CPT | Performed by: INTERNAL MEDICINE

## 2024-02-22 PROCEDURE — 99223 1ST HOSP IP/OBS HIGH 75: CPT | Performed by: INTERNAL MEDICINE

## 2024-02-22 PROCEDURE — 71045 X-RAY EXAM CHEST 1 VIEW: CPT

## 2024-02-22 PROCEDURE — 84484 ASSAY OF TROPONIN QUANT: CPT | Performed by: STUDENT IN AN ORGANIZED HEALTH CARE EDUCATION/TRAINING PROGRAM

## 2024-02-22 PROCEDURE — 80053 COMPREHEN METABOLIC PANEL: CPT | Performed by: STUDENT IN AN ORGANIZED HEALTH CARE EDUCATION/TRAINING PROGRAM

## 2024-02-22 PROCEDURE — 71100 X-RAY EXAM RIBS UNI 2 VIEWS: CPT | Mod: RT

## 2024-02-22 PROCEDURE — 1210000001 HC SEMI-PRIVATE ROOM DAILY

## 2024-02-22 PROCEDURE — 86901 BLOOD TYPING SEROLOGIC RH(D): CPT | Performed by: STUDENT IN AN ORGANIZED HEALTH CARE EDUCATION/TRAINING PROGRAM

## 2024-02-22 PROCEDURE — 2500000004 HC RX 250 GENERAL PHARMACY W/ HCPCS (ALT 636 FOR OP/ED): Performed by: STUDENT IN AN ORGANIZED HEALTH CARE EDUCATION/TRAINING PROGRAM

## 2024-02-22 PROCEDURE — 96374 THER/PROPH/DIAG INJ IV PUSH: CPT

## 2024-02-22 PROCEDURE — 71275 CT ANGIOGRAPHY CHEST: CPT | Performed by: RADIOLOGY

## 2024-02-22 PROCEDURE — 71100 X-RAY EXAM RIBS UNI 2 VIEWS: CPT | Mod: RIGHT SIDE | Performed by: RADIOLOGY

## 2024-02-22 PROCEDURE — 71275 CT ANGIOGRAPHY CHEST: CPT

## 2024-02-22 PROCEDURE — 71045 X-RAY EXAM CHEST 1 VIEW: CPT | Performed by: RADIOLOGY

## 2024-02-22 PROCEDURE — 82270 OCCULT BLOOD FECES: CPT | Performed by: STUDENT IN AN ORGANIZED HEALTH CARE EDUCATION/TRAINING PROGRAM

## 2024-02-22 RX ORDER — LOSARTAN POTASSIUM 50 MG/1
50 TABLET ORAL 2 TIMES DAILY
Status: DISCONTINUED | OUTPATIENT
Start: 2024-02-22 | End: 2024-02-27 | Stop reason: HOSPADM

## 2024-02-22 RX ORDER — PANTOPRAZOLE SODIUM 40 MG/10ML
80 INJECTION, POWDER, LYOPHILIZED, FOR SOLUTION INTRAVENOUS ONCE
Status: COMPLETED | OUTPATIENT
Start: 2024-02-22 | End: 2024-02-22

## 2024-02-22 RX ADMIN — LOSARTAN POTASSIUM 50 MG: 50 TABLET, FILM COATED ORAL at 22:52

## 2024-02-22 RX ADMIN — PANTOPRAZOLE SODIUM 80 MG: 40 INJECTION, POWDER, FOR SOLUTION INTRAVENOUS at 22:45

## 2024-02-22 RX ADMIN — IOHEXOL 70 ML: 350 INJECTION, SOLUTION INTRAVENOUS at 22:15

## 2024-02-22 ASSESSMENT — COLUMBIA-SUICIDE SEVERITY RATING SCALE - C-SSRS
2. HAVE YOU ACTUALLY HAD ANY THOUGHTS OF KILLING YOURSELF?: NO
1. IN THE PAST MONTH, HAVE YOU WISHED YOU WERE DEAD OR WISHED YOU COULD GO TO SLEEP AND NOT WAKE UP?: NO
6. HAVE YOU EVER DONE ANYTHING, STARTED TO DO ANYTHING, OR PREPARED TO DO ANYTHING TO END YOUR LIFE?: NO

## 2024-02-22 ASSESSMENT — PAIN SCALES - GENERAL: PAINLEVEL_OUTOF10: 0 - NO PAIN

## 2024-02-22 ASSESSMENT — LIFESTYLE VARIABLES
EVER FELT BAD OR GUILTY ABOUT YOUR DRINKING: NO
HAVE YOU EVER FELT YOU SHOULD CUT DOWN ON YOUR DRINKING: NO
EVER HAD A DRINK FIRST THING IN THE MORNING TO STEADY YOUR NERVES TO GET RID OF A HANGOVER: NO
HAVE PEOPLE ANNOYED YOU BY CRITICIZING YOUR DRINKING: NO

## 2024-02-22 ASSESSMENT — PAIN - FUNCTIONAL ASSESSMENT: PAIN_FUNCTIONAL_ASSESSMENT: 0-10

## 2024-02-22 NOTE — TELEPHONE ENCOUNTER
Spoke with pt son and states that the pt is at the hospital for a blood transfusion due to her hemoglobin is low like 7.6.

## 2024-02-23 ENCOUNTER — ANESTHESIA EVENT (OUTPATIENT)
Dept: GASTROENTEROLOGY | Facility: HOSPITAL | Age: 79
DRG: 385 | End: 2024-02-23
Payer: MEDICARE

## 2024-02-23 ENCOUNTER — ANESTHESIA (OUTPATIENT)
Dept: GASTROENTEROLOGY | Facility: HOSPITAL | Age: 79
DRG: 385 | End: 2024-02-23
Payer: MEDICARE

## 2024-02-23 ENCOUNTER — APPOINTMENT (OUTPATIENT)
Dept: GASTROENTEROLOGY | Facility: HOSPITAL | Age: 79
DRG: 385 | End: 2024-02-23
Payer: MEDICARE

## 2024-02-23 LAB
ALBUMIN SERPL BCP-MCNC: 3 G/DL (ref 3.4–5)
ALP SERPL-CCNC: 99 U/L (ref 33–136)
ALT SERPL W P-5'-P-CCNC: 10 U/L (ref 7–45)
ANION GAP SERPL CALC-SCNC: 14 MMOL/L (ref 10–20)
APTT PPP: 29 SECONDS (ref 27–38)
AST SERPL W P-5'-P-CCNC: 14 U/L (ref 9–39)
BILIRUB SERPL-MCNC: 0.3 MG/DL (ref 0–1.2)
BLOOD EXPIRATION DATE: NORMAL
BNP SERPL-MCNC: 91 PG/ML (ref 0–99)
BUN SERPL-MCNC: 14 MG/DL (ref 6–23)
C DIF TOX TCDA+TCDB STL QL NAA+PROBE: DETECTED
C DIFF TOX A+B STL QL IA: POSITIVE
CALCIUM SERPL-MCNC: 8.1 MG/DL (ref 8.6–10.3)
CHLORIDE SERPL-SCNC: 105 MMOL/L (ref 98–107)
CO2 SERPL-SCNC: 23 MMOL/L (ref 21–32)
CREAT SERPL-MCNC: 0.59 MG/DL (ref 0.5–1.05)
CRP SERPL-MCNC: 1.24 MG/DL
DISPENSE STATUS: NORMAL
EGFRCR SERPLBLD CKD-EPI 2021: >90 ML/MIN/1.73M*2
ERYTHROCYTE [DISTWIDTH] IN BLOOD BY AUTOMATED COUNT: 13.8 % (ref 11.5–14.5)
ERYTHROCYTE [DISTWIDTH] IN BLOOD BY AUTOMATED COUNT: 13.8 % (ref 11.5–14.5)
ERYTHROCYTE [DISTWIDTH] IN BLOOD BY AUTOMATED COUNT: 14 % (ref 11.5–14.5)
GLUCOSE SERPL-MCNC: 92 MG/DL (ref 74–99)
HCT VFR BLD AUTO: 25.8 % (ref 36–46)
HCT VFR BLD AUTO: 27.4 % (ref 36–46)
HCT VFR BLD AUTO: 30 % (ref 36–46)
HGB BLD-MCNC: 8 G/DL (ref 12–16)
HGB BLD-MCNC: 8.7 G/DL (ref 12–16)
HGB BLD-MCNC: 9.5 G/DL (ref 12–16)
HOLD SPECIMEN: NORMAL
INR PPP: 1 (ref 0.9–1.1)
MAGNESIUM SERPL-MCNC: 1.88 MG/DL (ref 1.6–2.4)
MCH RBC QN AUTO: 30.4 PG (ref 26–34)
MCH RBC QN AUTO: 31 PG (ref 26–34)
MCH RBC QN AUTO: 31 PG (ref 26–34)
MCHC RBC AUTO-ENTMCNC: 31 G/DL (ref 32–36)
MCHC RBC AUTO-ENTMCNC: 31.7 G/DL (ref 32–36)
MCHC RBC AUTO-ENTMCNC: 31.8 G/DL (ref 32–36)
MCV RBC AUTO: 98 FL (ref 80–100)
NRBC BLD-RTO: 0 /100 WBCS (ref 0–0)
PLATELET # BLD AUTO: 527 X10*3/UL (ref 150–450)
PLATELET # BLD AUTO: 585 X10*3/UL (ref 150–450)
PLATELET # BLD AUTO: 593 X10*3/UL (ref 150–450)
POTASSIUM SERPL-SCNC: 3.8 MMOL/L (ref 3.5–5.3)
PRODUCT BLOOD TYPE: 600
PRODUCT CODE: NORMAL
PROT SERPL-MCNC: 5.1 G/DL (ref 6.4–8.2)
PROTHROMBIN TIME: 11 SECONDS (ref 9.8–12.8)
RBC # BLD AUTO: 2.63 X10*6/UL (ref 4–5.2)
RBC # BLD AUTO: 2.81 X10*6/UL (ref 4–5.2)
RBC # BLD AUTO: 3.06 X10*6/UL (ref 4–5.2)
SODIUM SERPL-SCNC: 138 MMOL/L (ref 136–145)
UFH PPP CHRO-ACNC: 0.8 IU/ML
UNIT ABO: NORMAL
UNIT NUMBER: NORMAL
UNIT RH: NORMAL
UNIT VOLUME: 350
WBC # BLD AUTO: 12 X10*3/UL (ref 4.4–11.3)
WBC # BLD AUTO: 14.2 X10*3/UL (ref 4.4–11.3)
WBC # BLD AUTO: 14.6 X10*3/UL (ref 4.4–11.3)
XM INTEP: NORMAL

## 2024-02-23 PROCEDURE — 85610 PROTHROMBIN TIME: CPT | Performed by: INTERNAL MEDICINE

## 2024-02-23 PROCEDURE — 97161 PT EVAL LOW COMPLEX 20 MIN: CPT | Mod: GP

## 2024-02-23 PROCEDURE — 87506 IADNA-DNA/RNA PROBE TQ 6-11: CPT | Mod: PARLAB | Performed by: NURSE PRACTITIONER

## 2024-02-23 PROCEDURE — 7100000010 HC PHASE TWO TIME - EACH INCREMENTAL 1 MINUTE

## 2024-02-23 PROCEDURE — 3700000002 HC GENERAL ANESTHESIA TIME - EACH INCREMENTAL 1 MINUTE

## 2024-02-23 PROCEDURE — 99233 SBSQ HOSP IP/OBS HIGH 50: CPT

## 2024-02-23 PROCEDURE — 2500000004 HC RX 250 GENERAL PHARMACY W/ HCPCS (ALT 636 FOR OP/ED)

## 2024-02-23 PROCEDURE — 2500000001 HC RX 250 WO HCPCS SELF ADMINISTERED DRUGS (ALT 637 FOR MEDICARE OP): Performed by: INTERNAL MEDICINE

## 2024-02-23 PROCEDURE — 7100000009 HC PHASE TWO TIME - INITIAL BASE CHARGE

## 2024-02-23 PROCEDURE — 36415 COLL VENOUS BLD VENIPUNCTURE: CPT | Performed by: INTERNAL MEDICINE

## 2024-02-23 PROCEDURE — 2500000004 HC RX 250 GENERAL PHARMACY W/ HCPCS (ALT 636 FOR OP/ED): Performed by: ANESTHESIOLOGIST ASSISTANT

## 2024-02-23 PROCEDURE — 36430 TRANSFUSION BLD/BLD COMPNT: CPT

## 2024-02-23 PROCEDURE — 43235 EGD DIAGNOSTIC BRUSH WASH: CPT | Performed by: STUDENT IN AN ORGANIZED HEALTH CARE EDUCATION/TRAINING PROGRAM

## 2024-02-23 PROCEDURE — 83735 ASSAY OF MAGNESIUM: CPT

## 2024-02-23 PROCEDURE — 1200000002 HC GENERAL ROOM WITH TELEMETRY DAILY

## 2024-02-23 PROCEDURE — 99100 ANES PT EXTEME AGE<1 YR&>70: CPT | Performed by: ANESTHESIOLOGY

## 2024-02-23 PROCEDURE — A43235 PR ESOPHAGOGASTRODUODENOSCOPY TRANSORAL DIAGNOSTIC: Performed by: ANESTHESIOLOGY

## 2024-02-23 PROCEDURE — A43235 PR ESOPHAGOGASTRODUODENOSCOPY TRANSORAL DIAGNOSTIC: Performed by: ANESTHESIOLOGIST ASSISTANT

## 2024-02-23 PROCEDURE — 85520 HEPARIN ASSAY: CPT | Performed by: INTERNAL MEDICINE

## 2024-02-23 PROCEDURE — 97530 THERAPEUTIC ACTIVITIES: CPT | Mod: GP

## 2024-02-23 PROCEDURE — 85027 COMPLETE CBC AUTOMATED: CPT | Performed by: INTERNAL MEDICINE

## 2024-02-23 PROCEDURE — 87324 CLOSTRIDIUM AG IA: CPT | Mod: PARLAB | Performed by: NURSE PRACTITIONER

## 2024-02-23 PROCEDURE — 43235 EGD DIAGNOSTIC BRUSH WASH: CPT

## 2024-02-23 PROCEDURE — 2500000005 HC RX 250 GENERAL PHARMACY W/O HCPCS: Performed by: ANESTHESIOLOGIST ASSISTANT

## 2024-02-23 PROCEDURE — 3700000001 HC GENERAL ANESTHESIA TIME - INITIAL BASE CHARGE

## 2024-02-23 PROCEDURE — 85730 THROMBOPLASTIN TIME PARTIAL: CPT | Performed by: INTERNAL MEDICINE

## 2024-02-23 PROCEDURE — 2500000001 HC RX 250 WO HCPCS SELF ADMINISTERED DRUGS (ALT 637 FOR MEDICARE OP)

## 2024-02-23 PROCEDURE — 97165 OT EVAL LOW COMPLEX 30 MIN: CPT | Mod: GO

## 2024-02-23 PROCEDURE — 36415 COLL VENOUS BLD VENIPUNCTURE: CPT

## 2024-02-23 PROCEDURE — 99222 1ST HOSP IP/OBS MODERATE 55: CPT | Performed by: NURSE PRACTITIONER

## 2024-02-23 PROCEDURE — P9016 RBC LEUKOCYTES REDUCED: HCPCS

## 2024-02-23 PROCEDURE — 83993 ASSAY FOR CALPROTECTIN FECAL: CPT | Performed by: NURSE PRACTITIONER

## 2024-02-23 PROCEDURE — 2500000004 HC RX 250 GENERAL PHARMACY W/ HCPCS (ALT 636 FOR OP/ED): Performed by: INTERNAL MEDICINE

## 2024-02-23 PROCEDURE — 87493 C DIFF AMPLIFIED PROBE: CPT | Mod: PARLAB | Performed by: NURSE PRACTITIONER

## 2024-02-23 PROCEDURE — 80053 COMPREHEN METABOLIC PANEL: CPT

## 2024-02-23 PROCEDURE — 0DJ08ZZ INSPECTION OF UPPER INTESTINAL TRACT, VIA NATURAL OR ARTIFICIAL OPENING ENDOSCOPIC: ICD-10-PCS | Performed by: STUDENT IN AN ORGANIZED HEALTH CARE EDUCATION/TRAINING PROGRAM

## 2024-02-23 PROCEDURE — 85027 COMPLETE CBC AUTOMATED: CPT

## 2024-02-23 RX ORDER — DIPHENHYDRAMINE HYDROCHLORIDE 50 MG/ML
INJECTION INTRAMUSCULAR; INTRAVENOUS
Status: COMPLETED
Start: 2024-02-23 | End: 2024-02-23

## 2024-02-23 RX ORDER — ACETAMINOPHEN 650 MG/1
650 SUPPOSITORY RECTAL EVERY 4 HOURS PRN
Status: DISCONTINUED | OUTPATIENT
Start: 2024-02-23 | End: 2024-02-27 | Stop reason: HOSPADM

## 2024-02-23 RX ORDER — DIPHENHYDRAMINE HYDROCHLORIDE 50 MG/ML
25 INJECTION INTRAMUSCULAR; INTRAVENOUS EVERY 6 HOURS PRN
Status: DISCONTINUED | OUTPATIENT
Start: 2024-02-23 | End: 2024-02-27 | Stop reason: HOSPADM

## 2024-02-23 RX ORDER — ACETAMINOPHEN 325 MG/1
650 TABLET ORAL EVERY 4 HOURS PRN
Status: DISCONTINUED | OUTPATIENT
Start: 2024-02-23 | End: 2024-02-27 | Stop reason: HOSPADM

## 2024-02-23 RX ORDER — HEPARIN SODIUM 5000 [USP'U]/ML
80 INJECTION, SOLUTION INTRAVENOUS; SUBCUTANEOUS ONCE
Status: COMPLETED | OUTPATIENT
Start: 2024-02-23 | End: 2024-02-23

## 2024-02-23 RX ORDER — PREDNISONE 5 MG/1
5 TABLET ORAL DAILY
Status: DISCONTINUED | OUTPATIENT
Start: 2024-02-23 | End: 2024-02-23

## 2024-02-23 RX ORDER — HEPARIN SODIUM 10000 [USP'U]/100ML
0-4500 INJECTION, SOLUTION INTRAVENOUS CONTINUOUS
Status: DISCONTINUED | OUTPATIENT
Start: 2024-02-23 | End: 2024-02-26

## 2024-02-23 RX ORDER — ASPIRIN 81 MG/1
81 TABLET ORAL DAILY
Status: DISCONTINUED | OUTPATIENT
Start: 2024-02-23 | End: 2024-02-27 | Stop reason: HOSPADM

## 2024-02-23 RX ORDER — MESALAMINE 500 MG/1
500 CAPSULE ORAL 3 TIMES DAILY
COMMUNITY
Start: 2024-01-30

## 2024-02-23 RX ORDER — LIDOCAINE HCL/PF 100 MG/5ML
SYRINGE (ML) INTRAVENOUS AS NEEDED
Status: DISCONTINUED | OUTPATIENT
Start: 2024-02-23 | End: 2024-02-23

## 2024-02-23 RX ORDER — TALC
3 POWDER (GRAM) TOPICAL DAILY
Status: DISCONTINUED | OUTPATIENT
Start: 2024-02-23 | End: 2024-02-27 | Stop reason: HOSPADM

## 2024-02-23 RX ORDER — HEPARIN SODIUM 5000 [USP'U]/ML
2000-4000 INJECTION, SOLUTION INTRAVENOUS; SUBCUTANEOUS EVERY 4 HOURS PRN
Status: DISCONTINUED | OUTPATIENT
Start: 2024-02-23 | End: 2024-02-26

## 2024-02-23 RX ORDER — PROPOFOL 10 MG/ML
INJECTION, EMULSION INTRAVENOUS AS NEEDED
Status: DISCONTINUED | OUTPATIENT
Start: 2024-02-23 | End: 2024-02-23

## 2024-02-23 RX ORDER — ACETAMINOPHEN 160 MG/5ML
650 SOLUTION ORAL EVERY 4 HOURS PRN
Status: DISCONTINUED | OUTPATIENT
Start: 2024-02-23 | End: 2024-02-27 | Stop reason: HOSPADM

## 2024-02-23 RX ORDER — PHENYLEPHRINE HCL IN 0.9% NACL 1 MG/10 ML
SYRINGE (ML) INTRAVENOUS AS NEEDED
Status: DISCONTINUED | OUTPATIENT
Start: 2024-02-23 | End: 2024-02-23

## 2024-02-23 RX ORDER — MESALAMINE 500 MG/1
1000 CAPSULE, EXTENDED RELEASE ORAL
Status: DISCONTINUED | OUTPATIENT
Start: 2024-02-24 | End: 2024-02-27 | Stop reason: HOSPADM

## 2024-02-23 RX ORDER — POLYETHYLENE GLYCOL 3350 17 G/17G
17 POWDER, FOR SOLUTION ORAL DAILY PRN
Status: DISCONTINUED | OUTPATIENT
Start: 2024-02-23 | End: 2024-02-27 | Stop reason: HOSPADM

## 2024-02-23 RX ORDER — DIPHENHYDRAMINE HYDROCHLORIDE 50 MG/ML
25 INJECTION INTRAMUSCULAR; INTRAVENOUS ONCE
Status: COMPLETED | OUTPATIENT
Start: 2024-02-23 | End: 2024-02-23

## 2024-02-23 RX ORDER — MESALAMINE 500 MG/1
1500 CAPSULE, EXTENDED RELEASE ORAL 2 TIMES DAILY
Status: DISCONTINUED | OUTPATIENT
Start: 2024-02-23 | End: 2024-02-27 | Stop reason: HOSPADM

## 2024-02-23 RX ADMIN — SODIUM CHLORIDE, SODIUM LACTATE, POTASSIUM CHLORIDE, AND CALCIUM CHLORIDE: .6; .31; .03; .02 INJECTION, SOLUTION INTRAVENOUS at 12:05

## 2024-02-23 RX ADMIN — DIPHENHYDRAMINE HYDROCHLORIDE 25 MG: 50 INJECTION INTRAMUSCULAR; INTRAVENOUS at 03:05

## 2024-02-23 RX ADMIN — MESALAMINE 1500 MG: 500 CAPSULE ORAL at 20:30

## 2024-02-23 RX ADMIN — PROPOFOL 50 MG: 10 INJECTION, EMULSION INTRAVENOUS at 12:10

## 2024-02-23 RX ADMIN — HEPARIN SODIUM 700 UNITS/HR: 10000 INJECTION, SOLUTION INTRAVENOUS at 17:17

## 2024-02-23 RX ADMIN — HEPARIN SODIUM 3250 UNITS: 5000 INJECTION INTRAVENOUS; SUBCUTANEOUS at 17:19

## 2024-02-23 RX ADMIN — LIDOCAINE HYDROCHLORIDE 30 MG: 20 INJECTION, SOLUTION INTRAVENOUS at 12:10

## 2024-02-23 RX ADMIN — DIPHENHYDRAMINE HYDROCHLORIDE 25 MG: 50 INJECTION, SOLUTION INTRAMUSCULAR; INTRAVENOUS at 03:05

## 2024-02-23 RX ADMIN — PREDNISONE 5 MG: 5 TABLET ORAL at 09:10

## 2024-02-23 RX ADMIN — PROPOFOL 20 MG: 10 INJECTION, EMULSION INTRAVENOUS at 12:12

## 2024-02-23 RX ADMIN — METHYLPREDNISOLONE SODIUM SUCCINATE 125 MG: 125 INJECTION, POWDER, LYOPHILIZED, FOR SOLUTION INTRAMUSCULAR; INTRAVENOUS at 03:10

## 2024-02-23 RX ADMIN — Medication 100 MCG: at 12:14

## 2024-02-23 SDOH — SOCIAL STABILITY: SOCIAL INSECURITY: DOES ANYONE TRY TO KEEP YOU FROM HAVING/CONTACTING OTHER FRIENDS OR DOING THINGS OUTSIDE YOUR HOME?: NO

## 2024-02-23 SDOH — SOCIAL STABILITY: SOCIAL INSECURITY: DO YOU FEEL ANYONE HAS EXPLOITED OR TAKEN ADVANTAGE OF YOU FINANCIALLY OR OF YOUR PERSONAL PROPERTY?: NO

## 2024-02-23 SDOH — SOCIAL STABILITY: SOCIAL INSECURITY: HAS ANYONE EVER THREATENED TO HURT YOUR FAMILY OR YOUR PETS?: NO

## 2024-02-23 SDOH — SOCIAL STABILITY: SOCIAL INSECURITY: DO YOU FEEL UNSAFE GOING BACK TO THE PLACE WHERE YOU ARE LIVING?: NO

## 2024-02-23 SDOH — SOCIAL STABILITY: SOCIAL INSECURITY: ARE YOU OR HAVE YOU BEEN THREATENED OR ABUSED PHYSICALLY, EMOTIONALLY, OR SEXUALLY BY ANYONE?: NO

## 2024-02-23 SDOH — SOCIAL STABILITY: SOCIAL INSECURITY: WERE YOU ABLE TO COMPLETE ALL THE BEHAVIORAL HEALTH SCREENINGS?: YES

## 2024-02-23 SDOH — SOCIAL STABILITY: SOCIAL INSECURITY: ARE THERE ANY APPARENT SIGNS OF INJURIES/BEHAVIORS THAT COULD BE RELATED TO ABUSE/NEGLECT?: NO

## 2024-02-23 SDOH — SOCIAL STABILITY: SOCIAL INSECURITY: ABUSE: ADULT

## 2024-02-23 SDOH — SOCIAL STABILITY: SOCIAL INSECURITY: HAVE YOU HAD THOUGHTS OF HARMING ANYONE ELSE?: NO

## 2024-02-23 SDOH — HEALTH STABILITY: MENTAL HEALTH: CURRENT SMOKER: 0

## 2024-02-23 ASSESSMENT — COGNITIVE AND FUNCTIONAL STATUS - GENERAL
DRESSING REGULAR UPPER BODY CLOTHING: A LITTLE
PERSONAL GROOMING: A LITTLE
MOBILITY SCORE: 18
MOVING TO AND FROM BED TO CHAIR: A LITTLE
MOVING FROM LYING ON BACK TO SITTING ON SIDE OF FLAT BED WITH BEDRAILS: A LITTLE
WALKING IN HOSPITAL ROOM: A LITTLE
CLIMB 3 TO 5 STEPS WITH RAILING: A LITTLE
HELP NEEDED FOR BATHING: A LITTLE
TURNING FROM BACK TO SIDE WHILE IN FLAT BAD: A LITTLE
DRESSING REGULAR LOWER BODY CLOTHING: A LITTLE
MOVING FROM LYING ON BACK TO SITTING ON SIDE OF FLAT BED WITH BEDRAILS: A LITTLE
TOILETING: A LITTLE
CLIMB 3 TO 5 STEPS WITH RAILING: A LITTLE
WALKING IN HOSPITAL ROOM: A LITTLE
STANDING UP FROM CHAIR USING ARMS: A LITTLE
TOILETING: A LITTLE
DRESSING REGULAR LOWER BODY CLOTHING: A LITTLE
DAILY ACTIVITIY SCORE: 19
STANDING UP FROM CHAIR USING ARMS: A LITTLE
DRESSING REGULAR UPPER BODY CLOTHING: A LITTLE
CLIMB 3 TO 5 STEPS WITH RAILING: A LITTLE
PERSONAL GROOMING: A LITTLE
DAILY ACTIVITIY SCORE: 19
DAILY ACTIVITIY SCORE: 24
HELP NEEDED FOR BATHING: A LITTLE
PATIENT BASELINE BEDBOUND: NO
MOVING TO AND FROM BED TO CHAIR: A LITTLE
TURNING FROM BACK TO SIDE WHILE IN FLAT BAD: A LITTLE
MOBILITY SCORE: 23
MOBILITY SCORE: 18

## 2024-02-23 ASSESSMENT — PAIN SCALES - GENERAL
PAINLEVEL_OUTOF10: 0 - NO PAIN
PAIN_LEVEL: 0
PAINLEVEL_OUTOF10: 0 - NO PAIN

## 2024-02-23 ASSESSMENT — ENCOUNTER SYMPTOMS
CONSTIPATION: 0
DIZZINESS: 0
ARTHRALGIAS: 0
MYALGIAS: 0
CONFUSION: 0
HALLUCINATIONS: 0
NAUSEA: 0
RHINORRHEA: 0
PALPITATIONS: 0
DYSURIA: 0
VOMITING: 0
HEMATURIA: 0
WOUND: 0
DIARRHEA: 1
SHORTNESS OF BREATH: 0
BLOOD IN STOOL: 1
FEVER: 0
COUGH: 0
CHILLS: 0
SORE THROAT: 0

## 2024-02-23 ASSESSMENT — LIFESTYLE VARIABLES
AUDIT-C TOTAL SCORE: 0
HOW MANY STANDARD DRINKS CONTAINING ALCOHOL DO YOU HAVE ON A TYPICAL DAY: PATIENT DOES NOT DRINK
SKIP TO QUESTIONS 9-10: 1
AUDIT-C TOTAL SCORE: 0
HOW OFTEN DO YOU HAVE 6 OR MORE DRINKS ON ONE OCCASION: NEVER
HOW OFTEN DO YOU HAVE A DRINK CONTAINING ALCOHOL: NEVER

## 2024-02-23 ASSESSMENT — PATIENT HEALTH QUESTIONNAIRE - PHQ9
SUM OF ALL RESPONSES TO PHQ9 QUESTIONS 1 & 2: 1
1. LITTLE INTEREST OR PLEASURE IN DOING THINGS: SEVERAL DAYS
2. FEELING DOWN, DEPRESSED OR HOPELESS: NOT AT ALL

## 2024-02-23 ASSESSMENT — ACTIVITIES OF DAILY LIVING (ADL)
HEARING - RIGHT EAR: FUNCTIONAL
JUDGMENT_ADEQUATE_SAFELY_COMPLETE_DAILY_ACTIVITIES: YES
ASSISTIVE_DEVICE: EYEGLASSES
FEEDING YOURSELF: INDEPENDENT
DRESSING YOURSELF: NEEDS ASSISTANCE
TOILETING: NEEDS ASSISTANCE
PATIENT'S MEMORY ADEQUATE TO SAFELY COMPLETE DAILY ACTIVITIES?: YES
LACK_OF_TRANSPORTATION: NO
WALKS IN HOME: NEEDS ASSISTANCE
GROOMING: NEEDS ASSISTANCE
ADEQUATE_TO_COMPLETE_ADL: YES
BATHING: NEEDS ASSISTANCE
HEARING - LEFT EAR: FUNCTIONAL

## 2024-02-23 ASSESSMENT — PAIN - FUNCTIONAL ASSESSMENT
PAIN_FUNCTIONAL_ASSESSMENT: 0-10

## 2024-02-23 NOTE — SIGNIFICANT EVENT
Called to bedside for possible transfusion reaction.  Patient tolerated nearly all of the PRBC transfusion, but then had severe itching.  Nursing immediately stopped transfusion, and then provider was called.  No hypotension.  No fevers.  No sob, hypoxia, wheezing or other respiratory symptoms.  No perfusion abnormalities.  Suspected allergic transfusion reaction, no signs of DIC, TACO, TRALI  Treated with IV benadryl and methylprednisolone.  Vitals remained stable.  Nursing notifying blood bank.  Will continue to monitor.  No need for repeat transfusion tonight.  Will repeat labs in am.

## 2024-02-23 NOTE — H&P
History Of Present Illness  Janice Ko is a 78 y.o. female with PMH UC, HTN, HLD, COPD, ?Afib not on anticoagulation, DVT, stroke, latent TB presented to [facility] with anemia.  Patient had outpatient labwork prior to initiation of a biologic for her UC when noted to have Hgb of 7.1.  Repeat Hgb in ED here shows 6.9. Patient admits to fatigue, SOB and weakness and chest pains.  Patient ordered on unit PRBC and CT chest did show acute PE. Patient states shortness of breath especially with exertion.    Patient follow with GI at [facility] and most recent flex sig was December 2023.  Patient currently being evaluated for initiation of biologic and waiting ID evaluation given latent TB for clearance.     Past Medical History  Past Medical History:   Diagnosis Date    Personal history of other diseases of the circulatory system     History of mitral valve prolapse    Personal history of other specified conditions     History of chest pain    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     Varicose veins of lower extremities with inflammation       Surgical History  Past Surgical History:   Procedure Laterality Date    BREAST BIOPSY  07/09/2018    Biopsy Breast Open    CATARACT EXTRACTION  07/09/2018    Cataract Surgery    MR HEAD ANGIO WO IV CONTRAST  12/27/2022    MR HEAD ANGIO WO IV CONTRAST 12/27/2022 DOCTOR OFFICE LEGAC[facility]    MR NECK ANGIO WO IV CONTRAST  12/27/2022    MR NECK ANGIO WO IV CONTRAST 12/27/2022 DOCTOR OFFICE LEGACY        Social History  She reports that she has never smoked. She has never used smokeless tobacco. She reports that she does not drink alcohol and does not use drugs.    Family History  Family History   Problem Relation Name Age of Onset    Heart failure Mother      Hypertension Mother      Other (malignant neoplasm of breast) Mother      Other (cva) Father      Hypertension Father      Other (myocardial infarction) Father      Other (stroke-in-evolution syndrome) Father       Multiple sclerosis Sister      Hypertension Brother      Alzheimer's disease Mother's Sister          Allergies  Epinephrine, Erythromycin, Sulfa (sulfonamide antibiotics), and Tetanus immune globulin    Review of Systems   Constitutional:  Negative for chills and fever.   HENT:  Negative for rhinorrhea and sore throat.    Respiratory:  Negative for cough and shortness of breath.    Cardiovascular:  Negative for chest pain and palpitations.   Gastrointestinal:  Positive for blood in stool and diarrhea. Negative for constipation, nausea and vomiting.   Genitourinary:  Negative for dysuria and hematuria.   Musculoskeletal:  Negative for arthralgias and myalgias.   Skin:  Negative for rash and wound.   Neurological:  Negative for dizziness and syncope.   Psychiatric/Behavioral:  Negative for confusion and hallucinations.         Physical Exam  Vitals reviewed.   Constitutional:       Appearance: Normal appearance.   HENT:      Head: Normocephalic and atraumatic.      Mouth/Throat:      Mouth: Mucous membranes are moist.   Eyes:      Conjunctiva/sclera: Conjunctivae normal.   Cardiovascular:      Rate and Rhythm: Normal rate.      Pulses: Normal pulses.   Pulmonary:      Effort: Pulmonary effort is normal.      Breath sounds: Normal breath sounds. No wheezing.   Abdominal:      General: Abdomen is flat. There is no distension.      Palpations: Abdomen is soft.      Tenderness: There is no guarding.   Musculoskeletal:         General: No swelling. Normal range of motion.   Skin:     General: Skin is warm and dry.   Neurological:      General: No focal deficit present.      Mental Status: She is alert. Mental status is at baseline.   Psychiatric:         Mood and Affect: Mood normal.         Behavior: Behavior normal.          Last Recorded Vitals  Blood pressure 126/68, pulse 73, temperature 37.5 °C (99.5 °F), temperature source Temporal, resp. rate 16, height 1.524 m (5'), weight (!) 35.4 kg (78 lb), SpO2 96  %.    Relevant Results             Assessment/Plan   Active Problems:  There are no active Hospital Problems.    UC with acute on chronic anemia/ lower G bleed  Acute PE  HTN  HLD   Latent TB    GI bleed  Admit for acute on chronic anemia with likely lower GI bleed secondary to UC.  Patient refusing repeat colonoscopy at this time.  She follows with GI at [facility], and recent Cscope/ flex sig in December.  Currently evaluated for biologic, awaiting ID clearance given latent TB.  Transfuse PRBC with goal Hgb >7.    Acute PE  History difficult to ascertain, but patient denies history of Afib.  There is also history of DVT noted, but only on aspirin.  Given acute bleed and anemia, and fact she is stable on room air, no right heart strain/ tachycardia will hold off on anticoagulation for tonight.  Discussed with patient and son at bedside and in agreement.  Will further risk assess anticoagulation in am/ prior to dc.    Hold her ARB dose until Bps stablize in setting of acute bleed.  No pharm DVT prophy  Clear liquids for now      Jose Noriega MD

## 2024-02-23 NOTE — ED PROVIDER NOTES
HPI   Chief Complaint   Patient presents with    Transfusion     blood       HPI    Janice Ko is a 78-year-old female with a past medical history of hypertension, hyperlipidemia, CVA, ulcerative colitis, latent TB, C. difficile, and iron deficiency anemia.  Patient presented following reports of hemoglobin at 7.1.  Patient was receiving blood work to check for LFTs in order to start a new biologic for TB.  Patient of bleeds and has received blood transfusions.  Patient denies any nausea, vomiting, abdominal pain.  Patient reports dark brown to black bowel movements but takes iron pills.  Patient patient takes aspirin daily.  Patient does not use NSAIDs.  Patient reports a mechanical fall while turning a week ago, did not hit head.  Since then patient endorses chest pain with deep inspiration.  Patient denies any palpitations, diaphoresis, shooting pain down the neck or arm.  Patient endorses increasing fatigue, shortness of breath with exertion, weakness, lightheadedness over past 1-2 weeks.  Patient is former smoker.                Bly Coma Scale Score: 15                     Patient History   Past Medical History:   Diagnosis Date    Personal history of other diseases of the circulatory system     History of mitral valve prolapse    Personal history of other specified conditions     History of chest pain    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     Varicose veins of lower extremities with inflammation     Past Surgical History:   Procedure Laterality Date    BREAST BIOPSY  07/09/2018    Biopsy Breast Open    CATARACT EXTRACTION  07/09/2018    Cataract Surgery    MR HEAD ANGIO WO IV CONTRAST  12/27/2022    MR HEAD ANGIO WO IV CONTRAST 12/27/2022 DOCTOR OFFICE LEGAC[facility]    MR NECK ANGIO WO IV CONTRAST  12/27/2022    MR NECK ANGIO WO IV CONTRAST 12/27/2022 DOCTOR OFFICE LEGAC[facility]     Family History   Problem Relation Name Age of Onset    Heart failure Mother      Hypertension Mother       Other (malignant neoplasm of breast) Mother      Other (cva) Father      Hypertension Father      Other (myocardial infarction) Father      Other (stroke-in-evolution syndrome) Father      Multiple sclerosis Sister      Hypertension Brother      Alzheimer's disease Mother's Sister       Social History     Tobacco Use    Smoking status: Never    Smokeless tobacco: Never   Substance Use Topics    Alcohol use: Never    Drug use: Never       Physical Exam   ED Triage Vitals [02/22/24 1836]   Temperature Heart Rate Respirations BP   37.5 °C (99.5 °F) 86 16 (!) 181/79      Pulse Ox Temp Source Heart Rate Source Patient Position   100 % Temporal Monitor --      BP Location FiO2 (%)     Right arm --       Physical Exam    Physical Exam:  General:  Pleasant and cooperative. No apparent distress.  HEENT:  Normocephalic, atraumatic  Chest:  Clear to auscultation bilaterally. No wheezes, rales, or rhonchi.  CV:  Regular rate and rhythm. No murmurs    Abdomen: Abdomen is soft, non-tender, non-distended. BS +   Extremities:  No lower extremity edema or cyanosis.   Neurological:  AAOx3. No focal deficits.  Skin:  Warm and dry.    ED Course & MDM   ED Course as of 02/22/24 2129   Thu Feb 22, 2024   2100 78-year-old female history of iron deficiency anemia presented to the emergency [facility] for multiple complaints including weakness low hemoglobin on outpatient labs and black stool.  Patient denies any other symptoms at this time.  She reports to be sharp pleuritic pain on the right posterior ribs she fell few days ago she did not hit her head from what she reports.  Examination shows tenderness to palpation of right ribs equal breath sounds bilaterally abdomen nontender.  Resident physician performed occult blood testing and it was melanotic in appearance.  Patient's EKG on my interpretation shows sinus rhythm ventricular 83  QRS 89 QTc 433 similar EKG to December 2022 CT of the chest to rule out pulmonary embolism and rib  fractures were ordered along with CBC CMP troponin which all revealed hemoglobin of 6.9 patient has been consented for blood and it was occult blood positive as well.  IV Protonix will be ordered 80 mg and patient will likely require admission. [ZS]   2106 Hand off to  [ZS]   2126 OCCULT BLOOD, STOOL X1(!): Positive [AB]      ED Course User Index  [AB] Adria Agotso MD  [ZS] Bebeto Oh MD       Medical Decision Making    Patient is a 78-year-old female with a history of hypertension, hyperlipidemia, CVA, UC, A-fib, and latent TB.  Patient presents from assisted living due to finding of hemoglobin of 7.1 as well as shortness of breath, weakness, fatigue.  Patient's initial presentation was concerning for a GI bleed.  Patient was hypertensive around 181/79 but otherwise hemodynamically stable.  Patient's CMP was unremarkable, initial troponin 7, type and screen type A, CBC was notable for hemoglobin 6.9, and occult blood was positive.  Patient likely having GI bleed, patient received lab work around 2 weeks ago so hemoglobin drop is likely occurred in a 2-3-week time period.  Patient also presented with chest pain on the right side that worsened with inspiration.  X-ray was ordered to assess for possible rib fracture, x-ray revealed questionable acute nondisplaced right anterior seventh rib fracture.  Patient was consented and a transfusion of 1 unit of blood was ordered.  Patient given Protonix 80.  Patient was discussed with nighttime resident who will continue care.    Procedure  Procedures     Ken Stovall MD  Resident  02/22/24 4962

## 2024-02-23 NOTE — CONSULTS
Nutrition Initial Assessment:   Nutrition Assessment    Reason for Assessment: Dietitian discretion (Low BMI=17.4; MST=0)    Patient is a 78 y.o. female presenting with: s/p fall, GIB, rib fracture    PmHx:  ulcerative colitis, HTN, HLD, COPD, DVT, stroke, acute PE, latent TB      Nutrition History:  Food and Nutrient History: Intakes not established yet.  Pt was in room at time of visit.  Wanted to have solid food. Pt notes that she did not want to have scope done as she had just had this done recently at [facility]. Due ulcerative colitis, pt having diarrhea every time she eats or drinks anything. Once diet advanced pt request Low Na+, Low fiber diet. Pt also notes that she drinks NextFit 1.4 supplement at home once per day.  Currently has Ensure clear three times daily orderd due to CLD  Food Allergies/Intolerances:  None  GI Symptoms: Diarrhea--UC  Oral Problems: None       Anthropometrics:  Height: 152.4 cm (5')   Weight: (!) 40.5 kg (89 lb 4.6 oz)   BMI (Calculated): 17.44  IBW/kg (Dietitian Calculated): 45.4 kg  Percent of IBW: 89 %       Weight History:     Weight Change %:  Weight History / % Weight Change: 2/22 35.4kg, 2/5 37.5kg, 1/16 39.8kg, 12/18 38.7kg, 7/13 43.9kg, 6/26 43.3kg, 4/13 44.1kg, 2/16 45kg  Significant Weight Loss: No    Nutrition Focused Physical Exam Findings:    Subcutaneous Fat Loss:   Orbital Fat Pads: Mild-Moderate (slight dark circles and slight hollowing)  Buccal Fat Pads: Mild-Moderate (flat cheeks, minimal bounce)  Triceps: Defer  Ribs: Defer  Muscle Wasting:  Temporalis: Well nourished (well-defined muscle)  Pectoralis (Clavicular Region): Severe (protruding prominent clavicle)  Deltoid/Trapezius: Severe (squared shoulders, acromion process prominent)  Interosseous: Defer  Trapezius/Infraspinatus/Supraspinatus (Scapular Region): Defer  Quadriceps: Defer  Gastrocnemius: Defer  Edema:  Edema: none  Physical Findings:  Skin: Negative    Nutrition Significant Labs:  CBC Trend:   Results  from last 7 days   Lab Units 02/23/24  0850 02/22/24 1958   WBC AUTO x10*3/uL 14.6* 8.7   RBC AUTO x10*6/uL 3.06* 2.37*   HEMOGLOBIN g/dL 9.5* 6.9*   HEMATOCRIT % 30.0* 23.7*   MCV fL 98 100   PLATELETS AUTO x10*3/uL 593* 599*    , BMP Trend:   Results from last 7 days   Lab Units 02/23/24  0850 02/22/24 1958   GLUCOSE mg/dL 92 95   CALCIUM mg/dL 8.1* 8.2*   SODIUM mmol/L 138 137   POTASSIUM mmol/L 3.8 3.8   CO2 mmol/L 23 24   CHLORIDE mmol/L 105 106   BUN mg/dL 14 20   CREATININE mg/dL 0.59 0.58    , BG POCT trend:        Nutrition Specific Medications:  Reviewed     I/O:   Last BM Date: 02/23/24; Stool Appearance: Loose (02/23/24 0900)        Dietary Orders (From admission, onward)       Start     Ordered    02/23/24 0826  Oral nutritional supplements  Until discontinued        Question Answer Comment   Deliver with All meals    Select supplement: Ensure Clear        02/23/24 0825    02/23/24 0340  Adult diet Clear Liquid  Diet effective now        Question:  Diet type  Answer:  Clear Liquid    02/23/24 0339                     Estimated Needs:      Method for Estimating Needs: 1215-1415kcals (30-35kcals/kg ABW)     Method for Estimating Needs: 44-52g (1.1-1.3g/kg ABW)     Method for Estimating Needs: 1 mL/kcal or as per MD        Nutrition Diagnosis   Malnutrition Diagnosis  Patient has Malnutrition Diagnosis: Yes  Diagnosis Status: New  Malnutrition Diagnosis: Moderate malnutrition related to chronic disease or condition  As Evidenced by: moderate to severe muscle wasting and moderate subcutaneous fat stores.  Additional Assessment Information: BMI 17.4            Nutrition Interventions/Recommendations         Nutrition Prescription:  Individualized Nutrition Prescription Provided for : Advance diet as able to Low fiber, 2-3gm Na+; continue with Ensure clear three times daily until diet advanced        Nutrition Interventions:   Interventions: Meals and snacks, Medical food supplement  Meals and Snacks:  Fluid-modified diet  Goal: advance diet as able; pt to consume >50-75% of meals  Medical Food Supplement: Commercial beverage  Goal: consume 100% of Ensure clear three times daily (for an additional 240 kcals, 9 gm protein each)    Collaboration and Referral of Nutrition Care: Other (Comment) (spoke with patient)    Nutrition Education:   Pt declined        Nutrition Monitoring and Evaluation   Food/Nutrient Related History Monitoring  Monitoring and Evaluation Plan: Energy intake, Fluid intake, Amount of food  Energy Intake: Estimated energy intake  Criteria: Meal/ONS intake to meet >75% of estimated needs  Fluid Intake: Estimated fluid intake  Criteria: fluid intake to meet >75% of estimated need  Amount of Food: Estimated amout of food, Medical food intake  Criteria: Pt to consume >75% of meals/ONS  Additional Plans: abiltity to advance diet    Body Composition/Growth/Weight History  Monitoring and Evaluation Plan: Weight  Weight: Measured weight  Criteria: weigh every 3 days    Biochemical Data, Medical Tests and Procedures  Monitoring and Evaluation Plan: Electrolyte/renal panel  Criteria: labs WNL    Nutrition Focused Physical Findings  Monitoring and Evaluation Plan: Skin  Criteria: maintain skin integrity       Time Spent/Follow-up Reminder:   Time Spent (min): 45 minutes  Last Date of Nutrition Visit: 02/23/24  Nutrition Follow-Up Needed?: 3-5 days  Follow up Comment: 2/26-2/28/24 TG (check diet)

## 2024-02-23 NOTE — PROGRESS NOTES
Emergency Medicine Transition of Care Note.    I received Janice Ko in signout from Dr. Stovall.  Please see the previous ED provider note for all HPI, PE and MDM up to the time of signout at 2100. This is in addition to the primary record.    In brief Janice Ko is an 78 y.o. female presenting for   Chief Complaint   Patient presents with    Transfusion     blood     At the time of signout we were awaiting: Ct imaging    ED Course as of 02/23/24 0652   Thu Feb 22, 2024 2100 78-year-old female history of iron deficiency anemia presented to the emergency [facility] for multiple complaints including weakness low hemoglobin on outpatient labs and black stool.  Patient denies any other symptoms at this time.  She reports to be sharp pleuritic pain on the right posterior ribs she fell few days ago she did not hit her head from what she reports.  Examination shows tenderness to palpation of right ribs equal breath sounds bilaterally abdomen nontender.  Resident physician performed occult blood testing and it was melanotic in appearance.  Patient's EKG on my interpretation shows sinus rhythm ventricular 83  QRS 89 QTc 433 similar EKG to December 2022 CT of the chest to rule out pulmonary embolism and rib fractures were ordered along with CBC CMP troponin which all revealed hemoglobin of 6.9 patient has been consented for blood and it was occult blood positive as well.  IV Protonix will be ordered 80 mg and patient will likely require admission. [ZS]   2106 Hand off to  [ZS]   2126 OCCULT BLOOD, STOOL X1(!): Positive [AB]   2354 CT angio chest for pulmonary embolism  Hospitalist updated on findings on CT concerning for small PE.  Given concerns for GI bleed, certainly would not anticoagulate as patient is essentially having no symptoms from a PE. [AB]      ED Course User Index  [AB] Adria Agosto MD  [ZS] Bebeto Oh MD         Diagnoses as of 02/23/24 0652   Gastrointestinal hemorrhage with  melena   Fall, initial encounter   Closed fracture of one rib of right side, initial encounter       Medical Decision Making  Patient is a 78-year-old female with past medical history significant for latent TB ulcerative colitis who presents for evaluation of ulcerative colitis flare found to have positive Hemoccult with symptomatic anemia.  Patient received a unit of PRBCs and in the setting of GI bleeding symptomatic anemia and need to start Biologics will admit for further evaluation and treatment.  Patient CT PE does reveal a small subsegmental PE.  In the setting of GI bleeding requiring a transfusion, will not start anticoagulation.  Patient remains hemodynamically stable on 0.5 L O2 for comfort.  Patient agreeable with plan for admission all questions answered.        Final diagnoses:   [K92.1] Gastrointestinal hemorrhage with melena   [W19.XXXA] Fall, initial encounter   [S22.31XA] Closed fracture of one rib of right side, initial encounter           Procedure  Procedures    Pt seen and discussed with Dr. Surendra Hobbs, DO     PGY-2 Emergency Medicine

## 2024-02-23 NOTE — PROGRESS NOTES
Physical Therapy    Physical Therapy Evaluation    Patient Name: Janice Ko  MRN: [id number]  Today's Date: 2/23/2024   Time Calculation  Start Time: 1004  Stop Time: 1030  Time Calculation (min): 26 min    Assessment/Plan   PT Assessment  End of Session Communication: Bedside nurse  End of Session Patient Position: Up in chair, Alarm off, not on at start of session (All needs in reach and no complaints noted)  IP OR SWING BED PT PLAN  Inpatient or Swing Bed: Inpatient    Subjective     General Visit Information:  General  Reason for Referral: PT Eval and Treat  Referred By: Jose Noriega MD  Past Medical History Relevant to Rehab: 2/22/24: fell one week prior, chest pain, increased fatigue, SOB, weakness, lightheadedness, concern for GI bleed, R 7th rib fracture. (PMH: anemia, COPD, CVA, HTN, hyperlipidemia, latent TB, c-diff, DVT, MVP, L hip fracture with ORIF, ulcerative colitis,)  Prior to Session Communication: Bedside nurse  Patient Position Received:  (Patient standing up at side of bed, stating need to use bedside commode.)    Home Living:  Home Living  Home Living Comments: Patient admitted from assisted living facility.    Prior Level of Function:  Prior Function Per Pt/Caregiver Report  Level of [place]:  (patient reports daughter assists with showers once/week; independent dressing, transfers & mobility without device; w/c for distances. Has RLE AFO. All IADLs provided at facility.)    Precautions:  Precautions  Precautions Comment: Contact plus, r/o c-diff    Objective     Pain:  Pain Assessment  Pain Assessment:  (0/10)    Cognition:  Cognition  Overall Cognitive Status: Within Functional Limits (A & O x 4, verbose, requires cues to remain on task.)    General Assessments:  Sensation  Light Touch: No apparent deficits  Strength  Strength Comments: B LE ROM and strength WFL  Dynamic Standing Balance  Dynamic Standing-Comments: Fair+ without an AD    Functional Assessments:  Bed  Mobility  Bed Mobility:  (did not assess due to pt being out of bed  upon PT entry, however pt reports independence)  Transfers  Transfer:  (STS from EOB and commode: independent)  Ambulation/Gait Training  Ambulation/Gait Training Performed:  (Pt was able to amb within her room without an AD independently demonstrating proper balance and safety awareness.)    Outcome Measures:  [facility] Basic Mobility  Turning from your back to your side while in a flat bed without using bedrails: None  Moving from lying on your back to sitting on the side of a flat bed without using bedrails: None  Moving to and from bed to chair (including a wheelchair): None  Standing up from a chair using your arms (e.g. wheelchair or bedside chair): None  To walk in hospital room: None  Climbing 3-5 steps with railing: A little  Basic Mobility - Total Score: 23    Education Documentation  No documentation found.  Education Comments  No comments found.

## 2024-02-23 NOTE — ANESTHESIA PREPROCEDURE EVALUATION
Patient: Janice Ko    Procedure Information       Date/Time: 02/23/24 1045    Scheduled providers: Mateo Oh MD    Procedure: EGD    Location: [place] [facility]            Relevant Problems   Anesthesia (within normal limits)      Cardiovascular   (+) Benign essential hypertension   (+) Chest pain, atypical   (+) Electrocardiogram abnormal   (+) Hyperlipidemia   (+) Mitral regurgitation   (+) Occlusion and stenosis of bilateral carotid arteries      GI   (+) Gastroesophageal reflux disease   (+) Gastrointestinal hemorrhage with melena   (+) Ulcerative colitis (CMS/HCC)      Neuro/Psych   (+) Cerebrovascular accident (CMS/HCC)   (+) Occlusion and stenosis of bilateral carotid arteries   (+) Situational anxiety      Pulmonary   (+) Chronic obstructive pulmonary disease (CMS/HCC)      GI/Hepatic   (+) Elevated LFTs      Hematology   (+) Anemia   (+) Iron deficiency anemia      Infectious Disease   (+) Acute bacterial sinusitis   (+) C. difficile colitis   (+) COVID-19   (+) Esophageal candidiasis (CMS/HCC)   (+) TB lung, latent      Other   (+) Arthritis       Clinical information reviewed:    Allergies  Meds  Problems              NPO Detail:  NPO/Void Status  Date of Last Liquid: 02/23/24  Time of Last Liquid: 0730  Date of Last Solid: 02/22/24  Time of Last Solid: 2330         Physical Exam    Airway  Mallampati: III  TM distance: >3 FB     Cardiovascular - normal exam  Rhythm: regular     Dental    Pulmonary - normal exam     Abdominal      Other findings: #26 recent bonding          Anesthesia Plan    History of general anesthesia?: yes  History of complications of general anesthesia?: no    ASA 4     MAC     The patient is not a current smoker.    intravenous induction   Anesthetic plan and risks discussed with patient.    Plan discussed with attending and CAA.

## 2024-02-23 NOTE — ANESTHESIA POSTPROCEDURE EVALUATION
Patient: Janice Ko    Procedure Summary       Date: 02/23/24 Room / Location: [facility]    Anesthesia Start: 1207 Anesthesia Stop: 1223    Procedure: EGD Diagnosis: Melena    Scheduled Providers: Mateo Oh MD Responsible Provider: Mateo Oh MD    Anesthesia Type: MAC ASA Status: 4            Anesthesia Type: MAC    Vitals Value Taken Time   /62 02/23/24 1222   Temp 36.2 °C (97.2 °F) 02/23/24 1222   Pulse 67 02/23/24 1222   Resp 16 02/23/24 1222   SpO2 100 % 02/23/24 1222       Anesthesia Post Evaluation    Patient location during evaluation: PACU  Patient participation: complete - patient participated  Level of consciousness: awake and alert  Pain score: 0  Pain management: adequate  Airway patency: patent  Cardiovascular status: acceptable  Respiratory status: acceptable  Hydration status: acceptable  Postoperative Nausea and Vomiting: none        There were no known notable events for this encounter.

## 2024-02-23 NOTE — PROGRESS NOTES
Occupational Therapy    Evaluation    Patient Name: Janice Ko  MRN: [id number]  Today's Date: 2/23/2024  Time Calculation  Start Time: 1005  Stop Time: 1030  Time Calculation (min): 25 min    Assessment  IP OT Assessment  End of Session Communication: Bedside nurse  End of Session Patient Position: Up in chair, Alarm off, not on at start of session (call light in reach)    Plan:  No Skilled OT: At baseline function  OT Discharge Recommendations: No further acute OT  OT - OK to Discharge: Yes (from an O.T. standpoint)    Subjective     Current Problem:  1. Gastrointestinal hemorrhage with melena        2. Fall, initial encounter        3. Closed fracture of one rib of right side, initial encounter        4. Melena  EGD    EGD          General:  General  Reason for Referral: OT eval & treat for ADLs (Ulcerative colitis with acute on chronic anemia, lower GI bleed, acute PE)  Referred By: Jose Noriega MD  Past Medical History Relevant to Rehab: 2/22/24: fell one week prior, chest pain, increased fatigue, SOB, weakness, lightheadedness, concern for GI bleed, R 7th rib fracture.   PMH: anemia, COPD, CVA, HTN, hyperlipidemia, latent TB, c-diff, DVT, MVP, L hip fracture with ORIF, ulcerative colitis  Prior to Session Communication: Bedside nurse  Patient Position Received:  (Patient standing up at side of bed, stating need to use bedside commode.)    Precautions:  Precautions Comment: Contact plus, r/o c-diff, high fall risk,      Pain:  Pain Assessment  Pain Assessment: 0-10  Pain Score: 0 - No pain    Objective     Cognition:  Overall Cognitive Status:  (A & O x 4, verbose, requires cues to remain on task.)        Home Living:  Home Living Comments: Patient admitted from assisted living facility; patient reports daughter assists with showers once/week; independent dressing, transfers & mobility without device; w/c for distances. Has RLE AFO.  All IADLs provided at facility.        ADL:  Grooming Assistance:  Independent  UE Dressing Assistance: Independent  LE Dressing Assistance: Independent  Toileting Assistance with Device: Independent      Bed Mobility/Transfers:    Transfers  Transfer:  (independent sit to stand from edge of bed and to/from bedside commode.)    Ambulation/Gait Training:  Ambulation/Gait Training  Ambulation/Gait Training Performed:  (Independent without device in room)     Strength:  Strength Comments: BUE AROM grossly WFL;  MMT not formally tested secondary to rib fracture; BUE  4/5    Coordination:  Movements are Fluid and Coordinated: Yes       Outcome Measures: [facility] Daily Activity  Putting on and taking off regular lower body clothing: None  Bathing (including washing, rinsing, drying): None  Putting on and taking off regular upper body clothing: None  Toileting, which includes using toilet, bedpan or urinal: None  Taking care of personal grooming such as brushing teeth: None  Eating Meals: None  Daily Activity - Total Score: 24           EDUCATION:  Education  Individual(s) Educated: Patient  Education Provided: Fall precautons  Patient/Caregiver Demonstrated Understanding: yes  Education Documentation  ADL Training, taught by Chely Moe OT at 2/23/2024 12:40 PM.  Learner: Patient  Readiness: Acceptance  Method: Explanation  Response: Verbalizes Understanding    Education Comments  No comments found.

## 2024-02-23 NOTE — PROGRESS NOTES
02/23/24 1356   Discharge Planning   Living Arrangements Alone   Support Systems Family members   Type of Residence Assisted living   Do you have animals or pets at home? No   Care Facility Name [facility]   Who is requesting discharge planning? Provider   Type of Post Acute Facility Services Assisted living   Financial Resource Strain   How hard is it for you to pay for the very basics like food, housing, medical care, and heating? Not very     Met with pt , pt admit from [facility] A/L  in [place] with dx of GIB.  GI consulted .  Pt denies any needs, plan is to return to her A/L at discharge.  Pt has support from her children, insurance verified.  Anaid Duke RN TCC

## 2024-02-23 NOTE — CONSULTS
Reason For Consult  Lower GIB, UC    This note was created using voice recognition transcription software. Despite proofreading, unintentional typographical errors may be present. Please contact the GI office with any questions or concerns.       This is a 77 yo Female with a PMH of UC (diagnosed in 1989 and was maintained on Pentasa), esophageal candidiasis (1/24), HTN, HLD, , ?Afib (not on AC), DVT, stroke, C diff (9/23 treated with Vanco), and latent TB who presented to [facility] on 2/22/24 with reports of anemia.  GI was consulted for lower GIB/UC.  She is a retired Xray tech.  She follows with Dr. Scooby Talavera @ [facility] for her UC.  Recently had EGD and was treated for esophageal candidiasis.          Subjective  Patient extremely anxious and it was somewhat difficult to get direct answers from her.  UC not controlled because she was on Prednisone per her.  Has been maintained on Pentasa for many years.  Trialed other meds but unclear of which meds.  Starting treatment for latent TB on Monday.  Follows with ID @ [facility].  Has 4-5 loose BM's daily and this is her baseline.  Last BM today.  Doesn't get colonoscopies regularly because she doesn't think she needs them.  Unclear on how often she has UC flares.  Denies abdominal pain, rectal bleeding, nausea, vomiting, fevers, chills.   Declined rectal exam.         EGD-1/15/24 @ [facility] showing esophageal candidiasis - Hpylori  Colonoscopy-12/5/23  pancolitis from anus to cecum (ulcerations).  Biopsies showed active chronic colitis  BM-Daily.  Normal consistency.  No bleeding or weight loss.  FHX-Son has UC and is on Lialda.  SHX-No tobacco/illicits/ETOH  Ab Sx-Denies   NSAIDs-Denies         Allergies: as mentioned in H&P      A 10 point review of system is negative except for what is mentioned in the HPI    Vital Signs: Reviewed    Physical Exam:  General: Extremely anxious and rambling speech  Skin:  Warm and dry, no jaundice  HEENT: No scleral icterus, no conjunctival pallor,  normocephalic, atraumatic, mucous membranes moist  Neck:  atraumatic, trachea midline, no JVD  Chest:  Clear to auscultation bilaterally. No wheezes, rales, or rhonchi  CV:  Regular rate and rhythm.  Positive S1/S2  Abdomen: no distension, +BS, soft, non-tender to palpation, no rebound tenderness, no guarding, no rigidity, no discernible ascites   Extremities: no lower extremity edema, chronic pigmentary changes, no cyanosis  Neurological:  A&Ox3  Psychiatric: cooperative     Investigations:  Labs, radiological imaging and cardiac work up were reviewed      Objective:         2/23/2024     2:00 AM 2/23/2024     3:04 AM 2/23/2024     3:15 AM 2/23/2024     3:30 AM 2/23/2024     4:04 AM 2/23/2024     4:26 AM 2/23/2024     4:27 AM   Vitals   Systolic 122 128 121 137 147  171   Diastolic 68 68 66 69 68  77   Heart Rate 78 78 77 94 95  79   Temp 36.3 °C (97.3 °F) 36.2 °C (97.2 °F) 36.3 °C (97.3 °F) 35.3 °C (95.5 °F) 36 °C (96.8 °F)  36.8 °C (98.2 °F)   Resp 19 19 16 18 16  16   Height (in)      1.524 m (5')    Weight (lb)      89.29    BMI      17.44 kg/m2    BSA (m2)      1.31 m2           Medications:  [Held by provider] aspirin, 81 mg, oral, Daily  losartan, 50 mg, oral, BID  melatonin, 3 mg, oral, Daily  predniSONE, 5 mg, oral, Daily         Recent Results (from the past 72 hour(s))   Lavender Top    Collection Time: 02/22/24  7:52 PM   Result Value Ref Range    Extra Tube Hold for add-ons.    Lavender Top    Collection Time: 02/22/24  7:52 PM   Result Value Ref Range    Extra Tube Hold for add-ons.    B-type natriuretic peptide    Collection Time: 02/22/24  7:52 PM   Result Value Ref Range    BNP 91 0 - 99 pg/mL   CBC and Auto Differential    Collection Time: 02/22/24  7:58 PM   Result Value Ref Range    WBC 8.7 4.4 - 11.3 x10*3/uL    nRBC 0.0 0.0 - 0.0 /100 WBCs    RBC 2.37 (L) 4.00 - 5.20 x10*6/uL    Hemoglobin 6.9 (L) 12.0 - 16.0 g/dL    Hematocrit 23.7 (L) 36.0 - 46.0 %     80 - 100 fL    MCH 29.1 26.0 -  34.0 pg    MCHC 29.1 (L) 32.0 - 36.0 g/dL    RDW 14.1 11.5 - 14.5 %    Platelets 599 (H) 150 - 450 x10*3/uL    Neutrophils % 70.7 40.0 - 80.0 %    Immature Granulocytes %, Automated 1.2 (H) 0.0 - 0.9 %    Lymphocytes % 19.4 13.0 - 44.0 %    Monocytes % 6.9 2.0 - 10.0 %    Eosinophils % 1.0 0.0 - 6.0 %    Basophils % 0.8 0.0 - 2.0 %    Neutrophils Absolute 6.13 (H) 1.60 - 5.50 x10*3/uL    Immature Granulocytes Absolute, Automated 0.10 0.00 - 0.50 x10*3/uL    Lymphocytes Absolute 1.68 0.80 - 3.00 x10*3/uL    Monocytes Absolute 0.60 0.05 - 0.80 x10*3/uL    Eosinophils Absolute 0.09 0.00 - 0.40 x10*3/uL    Basophils Absolute 0.07 0.00 - 0.10 x10*3/uL   Comprehensive metabolic panel    Collection Time: 02/22/24  7:58 PM   Result Value Ref Range    Glucose 95 74 - 99 mg/dL    Sodium 137 136 - 145 mmol/L    Potassium 3.8 3.5 - 5.3 mmol/L    Chloride 106 98 - 107 mmol/L    Bicarbonate 24 21 - 32 mmol/L    Anion Gap 11 10 - 20 mmol/L    Urea Nitrogen 20 6 - 23 mg/dL    Creatinine 0.58 0.50 - 1.05 mg/dL    eGFR >90 >60 mL/min/1.73m*2    Calcium 8.2 (L) 8.6 - 10.3 mg/dL    Albumin 2.9 (L) 3.4 - 5.0 g/dL    Alkaline Phosphatase 88 33 - 136 U/L    Total Protein 5.6 (L) 6.4 - 8.2 g/dL    AST 19 9 - 39 U/L    Bilirubin, Total 0.1 0.0 - 1.2 mg/dL    ALT 11 7 - 45 U/L   Type And Screen    Collection Time: 02/22/24  7:58 PM   Result Value Ref Range    ABO TYPE A     Rh TYPE NEG     ANTIBODY SCREEN NEG    Troponin I, High Sensitivity, Initial    Collection Time: 02/22/24  7:58 PM   Result Value Ref Range    Troponin I, High Sensitivity 7 0 - 13 ng/L   Morphology    Collection Time: 02/22/24  7:58 PM   Result Value Ref Range    RBC Morphology See Below     Polychromasia Mild     Ovalocytes Few    Occult Blood, Stool    Collection Time: 02/22/24  8:45 PM    Specimen: Stool   Result Value Ref Range    Occult Blood, Stool X1 Positive (A) Negative   Prepare RBC: 1 Units    Collection Time: 02/22/24  9:20 PM   Result Value Ref Range     PRODUCT CODE V8721P19     Unit Number [id number]     Unit ABO A     Unit RH NEG     XM INTEP COMP     Dispense Status TR     Blood Expiration Date March 22, 2024 23:59 EDT     PRODUCT BLOOD TYPE 0600     UNIT VOLUME 350    Troponin, High Sensitivity, 1 Hour    Collection Time: 02/22/24  9:24 PM   Result Value Ref Range    Troponin I, High Sensitivity 7 0 - 13 ng/L   VERIFY ABO/Rh Group Test    Collection Time: 02/22/24 10:43 PM   Result Value Ref Range    ABO TYPE A     Rh TYPE NEG           Assessment:  This is a 79 yo Female with a PMH of UC (diagnosed in 1989 and was maintained on Pentasa), esophageal candidiasis (1/24), HTN, HLD, , ?Afib (not on AC), DVT, stroke, C diff (9/23 treated with Vanco), and latent TB who presented to [facility] on 2/22/24 with reports of anemia.  GI was consulted for lower GIB/UC.  She is a retired Xray tech.  She follows [facility] for her [facility].    Recent hospitalization @ [place] on 1/24/24 for SOB. Fecal calprotectin was 1,240.  EGD performed during that admission showed esophageal candidiasis Colonoscopy on 12/5/23 @ [facility] showed diffuse chronic active pancolitis.  Hgb 6.9 upon admission to [facility], received blood and had a reaction while it was almost complete. Patient denies bleeding. CT angio chest showed a small subsegmental RLL PE and splenic flexure colitis.  EGD today normal. Anemia likely from UC/malabsorption.      Plan  1.)  LGIB/UC-Stool studies, CRP, transfuse as necessary, monitor for overt bleeding, trend Hgb.  Patient can follow-up with Dr. Scooby Talavera @ [facility] for UC for initiation of Entyvio.  EGD as stated above.  Supportive IV iron transfusions until UC is better controlled.       Discussed patient with Dr. Green.    I spent 60 minutes in the professional and overall care of this patient.      02/23/24 at 6:19 AM - TANIA Rodriguez-CNP

## 2024-02-23 NOTE — PROGRESS NOTES
Subjective   Patient anxious this AM. Reports dark stools associated with iron pills. Also reports dyspnea on exertion.       Objective     Last Recorded Vitals  /73 (BP Location: Right arm, Patient Position: Sitting)   Pulse 91   Temp 36.6 °C (97.9 °F) (Temporal)   Resp 16   Wt (!) 40.5 kg (89 lb 4.6 oz)   SpO2 96%   Intake/Output last 3 Shifts:    Intake/Output Summary (Last 24 hours) at 2/23/2024 1847  Last data filed at 2/23/2024 1802  Gross per 24 hour   Intake 476.5 ml   Output 300 ml   Net 176.5 ml       Admission Weight  Weight: (!) 35.4 kg (78 lb) (02/22/24 1836)    Daily Weight  02/23/24 : (!) 40.5 kg (89 lb 4.6 oz)      Physical Exam  Constitutional:       Appearance: Normal appearance. She is underweight.   HENT:      Head: Normocephalic and atraumatic.      Mouth/Throat:      Mouth: Mucous membranes are moist.   Eyes:      Extraocular Movements: Extraocular movements intact.   Cardiovascular:      Rate and Rhythm: Normal rate and regular rhythm.   Pulmonary:      Effort: Pulmonary effort is normal.      Breath sounds: Normal breath sounds.   Abdominal:      General: Abdomen is flat. There is no distension.      Palpations: Abdomen is soft.   Musculoskeletal:      Right lower leg: No edema.      Left lower leg: No edema.   Skin:     General: Skin is warm and dry.   Neurological:      General: No focal deficit present.      Mental Status: She is alert and oriented to person, place, and time.           Assessment/Plan   #Acute pulmonary embolism  #Ulcerative colitis flare  #Acute on chronic Anemia  #HTN  EGD performed 2/23, no signs of overt bleeding  Per GI, inflammatory state from UC flare are likely leading to a hypercoagulable state  Given 1 unit RBC, underwent transfusion reaction  -Start heparin ggt  -Monitor for signs of bleeding while on heparin ggt  -Resume patient's home mesalamine. Patient no longer on prednisone  -Eventual plan is to start Entyvio infusions after treatment of latent  TB  -Hold home aspirin and losartan, reintroduce if blood pressure stable and no signs of bleeding    #Latent tuberculosis  Patient's infectious disease doctor is Dr. 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    Dr. Krupa Peralta  Internal Medicine PGY-2  Please message me with any questions

## 2024-02-24 LAB
ALBUMIN SERPL BCP-MCNC: 2.7 G/DL (ref 3.4–5)
ALP SERPL-CCNC: 86 U/L (ref 33–136)
ALT SERPL W P-5'-P-CCNC: 9 U/L (ref 7–45)
ANION GAP SERPL CALC-SCNC: 10 MMOL/L (ref 10–20)
AST SERPL W P-5'-P-CCNC: 12 U/L (ref 9–39)
ATRIAL RATE: 82 BPM
BASOPHILS # BLD AUTO: 0.05 X10*3/UL (ref 0–0.1)
BASOPHILS NFR BLD AUTO: 0.4 %
BILIRUB SERPL-MCNC: 0.2 MG/DL (ref 0–1.2)
BUN SERPL-MCNC: 19 MG/DL (ref 6–23)
C COLI+JEJ+UPSA DNA STL QL NAA+PROBE: NOT DETECTED
CALCIUM SERPL-MCNC: 8.2 MG/DL (ref 8.6–10.3)
CHLORIDE SERPL-SCNC: 105 MMOL/L (ref 98–107)
CO2 SERPL-SCNC: 25 MMOL/L (ref 21–32)
CREAT SERPL-MCNC: 0.68 MG/DL (ref 0.5–1.05)
EC STX1 GENE STL QL NAA+PROBE: NOT DETECTED
EC STX2 GENE STL QL NAA+PROBE: NOT DETECTED
EGFRCR SERPLBLD CKD-EPI 2021: 89 ML/MIN/1.73M*2
EOSINOPHIL # BLD AUTO: 0.02 X10*3/UL (ref 0–0.4)
EOSINOPHIL NFR BLD AUTO: 0.2 %
ERYTHROCYTE [DISTWIDTH] IN BLOOD BY AUTOMATED COUNT: 14 % (ref 11.5–14.5)
GLUCOSE SERPL-MCNC: 89 MG/DL (ref 74–99)
HCT VFR BLD AUTO: 25.2 % (ref 36–46)
HGB BLD-MCNC: 8 G/DL (ref 12–16)
IMM GRANULOCYTES # BLD AUTO: 0.08 X10*3/UL (ref 0–0.5)
IMM GRANULOCYTES NFR BLD AUTO: 0.7 % (ref 0–0.9)
LYMPHOCYTES # BLD AUTO: 1.91 X10*3/UL (ref 0.8–3)
LYMPHOCYTES NFR BLD AUTO: 15.8 %
MAGNESIUM SERPL-MCNC: 1.85 MG/DL (ref 1.6–2.4)
MCH RBC QN AUTO: 30.7 PG (ref 26–34)
MCHC RBC AUTO-ENTMCNC: 31.7 G/DL (ref 32–36)
MCV RBC AUTO: 97 FL (ref 80–100)
MONOCYTES # BLD AUTO: 1.08 X10*3/UL (ref 0.05–0.8)
MONOCYTES NFR BLD AUTO: 8.9 %
NEUTROPHILS # BLD AUTO: 8.98 X10*3/UL (ref 1.6–5.5)
NEUTROPHILS NFR BLD AUTO: 74 %
NOROVIRUS GI + GII RNA STL NAA+PROBE: NOT DETECTED
NRBC BLD-RTO: 0 /100 WBCS (ref 0–0)
P AXIS: 77 DEGREES
PLATELET # BLD AUTO: 511 X10*3/UL (ref 150–450)
POTASSIUM SERPL-SCNC: 3.7 MMOL/L (ref 3.5–5.3)
PR INTERVAL: 149 MS
PROT SERPL-MCNC: 4.8 G/DL (ref 6.4–8.2)
Q ONSET: 249 MS
QRS COUNT: 13 BEATS
QRS DURATION: 89 MS
QT INTERVAL: 368 MS
QTC CALCULATION(BAZETT): 433 MS
QTC FREDERICIA: 410 MS
R AXIS: 45 DEGREES
RBC # BLD AUTO: 2.61 X10*6/UL (ref 4–5.2)
RV RNA STL NAA+PROBE: NOT DETECTED
SALMONELLA DNA STL QL NAA+PROBE: NOT DETECTED
SHIGELLA DNA SPEC QL NAA+PROBE: NOT DETECTED
SODIUM SERPL-SCNC: 136 MMOL/L (ref 136–145)
T AXIS: 61 DEGREES
T OFFSET: 433 MS
UFH PPP CHRO-ACNC: 0.4 IU/ML
UFH PPP CHRO-ACNC: 0.4 IU/ML
V CHOLERAE DNA STL QL NAA+PROBE: NOT DETECTED
VENTRICULAR RATE: 83 BPM
WBC # BLD AUTO: 12.1 X10*3/UL (ref 4.4–11.3)
Y ENTEROCOL DNA STL QL NAA+PROBE: NOT DETECTED

## 2024-02-24 PROCEDURE — 36415 COLL VENOUS BLD VENIPUNCTURE: CPT

## 2024-02-24 PROCEDURE — 2500000001 HC RX 250 WO HCPCS SELF ADMINISTERED DRUGS (ALT 637 FOR MEDICARE OP): Performed by: INTERNAL MEDICINE

## 2024-02-24 PROCEDURE — 84075 ASSAY ALKALINE PHOSPHATASE: CPT

## 2024-02-24 PROCEDURE — 85520 HEPARIN ASSAY: CPT | Performed by: INTERNAL MEDICINE

## 2024-02-24 PROCEDURE — 99233 SBSQ HOSP IP/OBS HIGH 50: CPT | Performed by: INTERNAL MEDICINE

## 2024-02-24 PROCEDURE — 2500000001 HC RX 250 WO HCPCS SELF ADMINISTERED DRUGS (ALT 637 FOR MEDICARE OP)

## 2024-02-24 PROCEDURE — 83735 ASSAY OF MAGNESIUM: CPT

## 2024-02-24 PROCEDURE — 1200000002 HC GENERAL ROOM WITH TELEMETRY DAILY

## 2024-02-24 PROCEDURE — 36415 COLL VENOUS BLD VENIPUNCTURE: CPT | Performed by: INTERNAL MEDICINE

## 2024-02-24 PROCEDURE — 85025 COMPLETE CBC W/AUTO DIFF WBC: CPT

## 2024-02-24 RX ORDER — HYDRALAZINE HYDROCHLORIDE 20 MG/ML
5 INJECTION INTRAMUSCULAR; INTRAVENOUS EVERY 8 HOURS PRN
Status: DISCONTINUED | OUTPATIENT
Start: 2024-02-24 | End: 2024-02-27 | Stop reason: HOSPADM

## 2024-02-24 RX ORDER — VANCOMYCIN HCL 50 MG/ML
125 SOLUTION, RECONSTITUTED, ORAL ORAL 4 TIMES DAILY
Status: DISCONTINUED | OUTPATIENT
Start: 2024-02-24 | End: 2024-02-24

## 2024-02-24 RX ORDER — L. ACIDOPHILUS/L.BULGARICUS 1MM CELL
1 TABLET ORAL DAILY
Status: DISCONTINUED | OUTPATIENT
Start: 2024-02-24 | End: 2024-02-27 | Stop reason: HOSPADM

## 2024-02-24 RX ADMIN — LOSARTAN POTASSIUM 50 MG: 50 TABLET, FILM COATED ORAL at 20:52

## 2024-02-24 RX ADMIN — MESALAMINE 1500 MG: 500 CAPSULE ORAL at 08:09

## 2024-02-24 RX ADMIN — MESALAMINE 1000 MG: 500 CAPSULE ORAL at 17:06

## 2024-02-24 RX ADMIN — FIDAXOMICIN 200 MG: 200 TABLET, FILM COATED ORAL at 13:54

## 2024-02-24 RX ADMIN — Medication 1 TABLET: at 12:38

## 2024-02-24 RX ADMIN — LOSARTAN POTASSIUM 50 MG: 50 TABLET, FILM COATED ORAL at 08:32

## 2024-02-24 RX ADMIN — FIDAXOMICIN 200 MG: 200 TABLET, FILM COATED ORAL at 20:52

## 2024-02-24 ASSESSMENT — COGNITIVE AND FUNCTIONAL STATUS - GENERAL
EATING MEALS: A LITTLE
EATING MEALS: A LITTLE
MOBILITY SCORE: 23
TOILETING: A LITTLE
DAILY ACTIVITIY SCORE: 19
DRESSING REGULAR LOWER BODY CLOTHING: A LITTLE
CLIMB 3 TO 5 STEPS WITH RAILING: A LITTLE
MOBILITY SCORE: 23
DRESSING REGULAR UPPER BODY CLOTHING: A LITTLE
DRESSING REGULAR LOWER BODY CLOTHING: A LITTLE
TOILETING: A LITTLE
HELP NEEDED FOR BATHING: A LITTLE
HELP NEEDED FOR BATHING: A LITTLE
DRESSING REGULAR UPPER BODY CLOTHING: A LITTLE
DAILY ACTIVITIY SCORE: 19
CLIMB 3 TO 5 STEPS WITH RAILING: A LITTLE

## 2024-02-24 ASSESSMENT — PAIN SCALES - GENERAL: PAINLEVEL_OUTOF10: 0 - NO PAIN

## 2024-02-24 NOTE — NURSING NOTE
At 0150, heparin assay result was 0.4. according to ordering parameters, this is therapeutic. This is the first therapeutic result. Heparin gtt to remain at 600units/hr

## 2024-02-24 NOTE — CONSULTS
Consults    Reason For Consult  Consult is requested by Lita Chow, for Dr. Cowart, for whom I am covering, reason, recurrent C. difficile, previously treated with Dificid.    History Of Present Illness  Janice Ko is a 78 y.o. female presenting with anemia to the emergency room with numerous other problems found.    This is a patient managed out of [facility] [place] who has had ulcerative colitis for over 20 years.  She said for the last 5 years or so she had been well-controlled with just Pentasa, but then fell and broke her hip and was admitted to [facility].  A Ortiz catheter was used and they thought she had an infection in her wound or urinary tract infection and used antibiotics resulting in C. difficile, that in September 2023.  Since then she has had 4 recurrences of C. difficile, the last 1 treated with Dificid with much better results.  Her baseline bowel pattern is perhaps twice a day, but she has had much more frequent bowel movements recently.    In 12/5/2023 she did have a colonoscopy performed by her gastroenterologist and [place], had significant ulcerations from rectum through the entire colon.  She has been on prednisone of 5 mg.  Plans to put her on some biologic.  Prior to that she was then tested with a blood test, probably a QuantiFERON test, and was found to have latent TB.  She has a chest x-ray which has some nodules apparently.  She was then referred to a infectious disease physician in [place] whom she cannot recall the name, perhaps starts with , and she is .  There are plans to start her on isoniazid next week prior to starting what ever biologic is being planned.    The patient then has a CBC prior to starting the biologic and she is found to be anemic with a hemoglobin of 7.1.  She is sent to the emergency room and chooses to go here since she lives here rather than to [facility].  Here she reports some chest pain since she had a fall a  week ago, and has a chest CAT scan which shows acute pulmonary emboli.  In addition she has developed frequent diarrhea, stools at least 8 times last night, and she has stools tested positive for C. difficile by EIA and PCR.  She has begun on Dificid.  Her stool is also occult blood positive.    Patient is hypertensive, dyslipidemic, history of a stroke, possible A-fib, history of COPD, has a history of esophageal candidiasis on upper endoscopy in January, as well as H. pylori per records, history of allergies to erythromycin and sulfa.     Past Medical History  She has a past medical history of Personal history of other diseases of the circulatory system, Personal history of other specified conditions, and 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.    Surgical History  She has a past surgical history that includes Cataract extraction (07/09/2018); Breast biopsy (07/09/2018); MR angio head wo IV contrast (12/27/2022); and MR angio neck wo IV contrast (12/27/2022).     Social History  She reports that she has never smoked. She has never used smokeless tobacco. She reports that she does not drink alcohol and does not use drugs.    Family History  Family History   Problem Relation Name Age of Onset    Heart failure Mother      Hypertension Mother      Other (malignant neoplasm of breast) Mother      Other (cva) Father      Hypertension Father      Other (myocardial infarction) Father      Other (stroke-in-evolution syndrome) Father      Multiple sclerosis Sister      Hypertension Brother      Alzheimer's disease Mother's Sister          Allergies  Epinephrine, Erythromycin, Sulfa (sulfonamide antibiotics), and Tetanus immune globulin    Review of Systems  Review of systems notes no rhinorrhea coryza or sore throat.  The patient does not have a cough, chest pains are minimal.  She is not nauseated but again has frequent bowel movements as mentioned, and has a hard time eating she is significantly  malnourished with a BMI of about 17.4.  She has no skin lesions.     Physical Exam  Awake alert nontoxic-appearing quite talkative.  HEENT is unremarkable no thrush no adenopathy lungs are clear anterior posteriorly heart no rub or gallop abdomen soft nonrigid no masses extremities without edema.  The patient is sitting on a commode since she has such frequent bowel movements     Last Recorded Vitals  /72   Pulse 80   Temp 36.5 °C (97.7 °F)   Resp 12   Wt (!) 40.5 kg (89 lb 4.6 oz)   SpO2 100%     Relevant Results  In addition to the stool testing above there was a stool for enteric pathogens that was checked and was negative     Assessment/Plan     Recurrent C. difficile disease    Assessment: It is always hard to distinguish C. difficile disease from an ulcerative colitis S2 which is causing the diarrhea and the most, but with stools toxin positive by both PCR and EIA and a history of responding in the past to Dificid, certainly a treatment plan with Dificid would be reasonable.  Her last plan was 10 days and that would be a good try at this point.  Hopefully her stools would become more normalized.  Anyone who has had 3 recurrences of C. difficile should be considered for fecal transplantation, but fecal transplantation is going by the [place], now with institution of oral bacterial treatments.  The treatment of choice is probably VOWST.  That would be typically given a few days after stopping a treatment plan like Dificid.  I recommended that to the patient and she would try to get this done with the infectious disease doctor she already works with.  Her daughter is already talked to that doctor about her being here and about being on Dificid.  Decisions of starting preventative treatments for latent TB can be made down by her outpatient infectious disease physician as well    Plan: Continue with Dificid for 10-day course, after which I would suggest a treatment plan with, with this being arranged  with her VOWST existing infectious disease physician after        Everardo Adams MD    Time spent on this consult approximately 60 minutes.  The above was produced with voice recognition software and may have errors that are common with that technology

## 2024-02-24 NOTE — NURSING NOTE
At 0555, heparin assay result was 0.4. according to ordering parameters, this is therapeutic. This is the second therapeutic result. Heparin gtt to remain at 600units/hr

## 2024-02-24 NOTE — NURSING NOTE
Pt heparin assay result is 0.8. according to ordering parameters heparin gtt to be decreased by 100units/hr. Heparin gtt now running at 600units/hr

## 2024-02-24 NOTE — CARE PLAN
The patient's goals for the shift include      The clinical goals for the shift include pt will tolerate heparin gtt        Problem: General Stroke  Goal: No symptoms of hemorrhage throughout shift  Outcome: Progressing     Problem: Safety  Goal: Patient will be injury free during hospitalization  Outcome: Progressing

## 2024-02-24 NOTE — CARE PLAN
No bleeding overnight,  Hgb stable despite being on heparin drip, + C diff, recommend Fidaxomicin with Zinplava.

## 2024-02-24 NOTE — PROGRESS NOTES
Hospital Medicine Progress Note    Subjective:  Janice Ko is a 78 y.o. female, who is hospital day 2.     Patient states she had about 8 loose bowel movements overnight, denies any obvious blood. States she develops urge to have a BM as soon as she eats anything. Feels okay otherwise. Son present at bedside.    Objective:    /65   Pulse 94   Temp 37.3 °C (99.1 °F)   Resp 12   Ht 1.524 m (5')   Wt (!) 40.5 kg (89 lb 4.6 oz)   SpO2 96%   BMI 17.44 kg/m²     Physical Exam:  GENERAL: A&Ox3, no distress, alert and cooperative  HEENT: NC/AT, EOMI, clear sclera, MMM  NECK: Supple, no JVD  CARDIOVASCULAR: RRR, no murmurs. S1/S2  RESPIRATORY: CTAB, no RRW or crackles. No distress  ABDOMEN: Soft, ND, non-tender. No rebound or guarding. BS+  EXTREMITIES: No peripheral edema or wounds  NEUROLOGICAL: A&Ox3. CN II-XII grossly intact. No focal deficits  SKIN: Warm and dry, no rashes  PSYCH: appropriate mood and affect, anxious    I personally reviewed all imaging, labs and notes/ documentation.     Assessment & Plan:    Acute PE, RLL subsegmental  Ulcerative colitis flare, mild  Recurrent C dif infection  Acute on chronic anemia     HTN, HLD  COPD  Esophageal candidiasis (dx 1/24)  Latent tuberculosis  ?A fib not on AC  Hx DVT  Stroke    Heparin ggt, if hgb remains stable will transition to oral AC, monitor for any bleeding, needs hypercoagulable workup outpatient, discussed this with pt & son at bedside  EGD on 2/23 without signs of overt bleeding  Given 1 unit pRBCs on admission & developed transfusion reaction  Continue home mesalamine, plan to start Entyvio outpatient after latent TB tx per her GI specialist  C dif positive, recurrent infection, start dificid as pt was tx with dificid recently with improvement, consult ID  Holding home aspirin, will resume as able  Home losartan resumed today  Patient follows with ID Dr. Kirsten Prather (), was supposed to start isoniazid on 2/26 but this will  likely be deferred given C dif recurrence, defer final decision to pt's ID  Continue other home meds    DVT Prophylaxis: heparin ggt, SCDs  Code Status: Full Code   Disposition: jose Chow DO  Internal Medicine/ Hospitalist

## 2024-02-25 LAB
ANION GAP SERPL CALC-SCNC: 8 MMOL/L (ref 10–20)
BUN SERPL-MCNC: 17 MG/DL (ref 6–23)
CALCIUM SERPL-MCNC: 7.7 MG/DL (ref 8.6–10.3)
CHLORIDE SERPL-SCNC: 108 MMOL/L (ref 98–107)
CO2 SERPL-SCNC: 27 MMOL/L (ref 21–32)
COMPONENT CODE: NORMAL
CREAT SERPL-MCNC: 0.58 MG/DL (ref 0.5–1.05)
EGFRCR SERPLBLD CKD-EPI 2021: >90 ML/MIN/1.73M*2
ERYTHROCYTE [DISTWIDTH] IN BLOOD BY AUTOMATED COUNT: 14 % (ref 11.5–14.5)
GLUCOSE SERPL-MCNC: 81 MG/DL (ref 74–99)
HCT VFR BLD AUTO: 24.4 % (ref 36–46)
HGB BLD-MCNC: 7.5 G/DL (ref 12–16)
MAGNESIUM SERPL-MCNC: 1.82 MG/DL (ref 1.6–2.4)
MCH RBC QN AUTO: 30.1 PG (ref 26–34)
MCHC RBC AUTO-ENTMCNC: 30.7 G/DL (ref 32–36)
MCV RBC AUTO: 98 FL (ref 80–100)
NRBC BLD-RTO: 0 /100 WBCS (ref 0–0)
PLATELET # BLD AUTO: 534 X10*3/UL (ref 150–450)
POTASSIUM SERPL-SCNC: 3.8 MMOL/L (ref 3.5–5.3)
RBC # BLD AUTO: 2.49 X10*6/UL (ref 4–5.2)
SODIUM SERPL-SCNC: 139 MMOL/L (ref 136–145)
UFH PPP CHRO-ACNC: 0.2 IU/ML
UFH PPP CHRO-ACNC: 0.4 IU/ML
UFH PPP CHRO-ACNC: 0.4 IU/ML
UNIT NUMBER: NORMAL
WBC # BLD AUTO: 9.3 X10*3/UL (ref 4.4–11.3)

## 2024-02-25 PROCEDURE — 2500000001 HC RX 250 WO HCPCS SELF ADMINISTERED DRUGS (ALT 637 FOR MEDICARE OP): Performed by: INTERNAL MEDICINE

## 2024-02-25 PROCEDURE — 36415 COLL VENOUS BLD VENIPUNCTURE: CPT

## 2024-02-25 PROCEDURE — 99232 SBSQ HOSP IP/OBS MODERATE 35: CPT | Performed by: PHYSICIAN ASSISTANT

## 2024-02-25 PROCEDURE — 85027 COMPLETE CBC AUTOMATED: CPT | Performed by: INTERNAL MEDICINE

## 2024-02-25 PROCEDURE — 83735 ASSAY OF MAGNESIUM: CPT | Performed by: INTERNAL MEDICINE

## 2024-02-25 PROCEDURE — 1200000002 HC GENERAL ROOM WITH TELEMETRY DAILY

## 2024-02-25 PROCEDURE — 80048 BASIC METABOLIC PNL TOTAL CA: CPT | Performed by: INTERNAL MEDICINE

## 2024-02-25 PROCEDURE — 2500000004 HC RX 250 GENERAL PHARMACY W/ HCPCS (ALT 636 FOR OP/ED)

## 2024-02-25 PROCEDURE — 85520 HEPARIN ASSAY: CPT | Performed by: INTERNAL MEDICINE

## 2024-02-25 PROCEDURE — 99232 SBSQ HOSP IP/OBS MODERATE 35: CPT | Performed by: INTERNAL MEDICINE

## 2024-02-25 PROCEDURE — 2500000001 HC RX 250 WO HCPCS SELF ADMINISTERED DRUGS (ALT 637 FOR MEDICARE OP)

## 2024-02-25 PROCEDURE — 85520 HEPARIN ASSAY: CPT

## 2024-02-25 PROCEDURE — 36415 COLL VENOUS BLD VENIPUNCTURE: CPT | Performed by: INTERNAL MEDICINE

## 2024-02-25 PROCEDURE — 2500000004 HC RX 250 GENERAL PHARMACY W/ HCPCS (ALT 636 FOR OP/ED): Performed by: INTERNAL MEDICINE

## 2024-02-25 RX ADMIN — MESALAMINE 1000 MG: 500 CAPSULE ORAL at 16:19

## 2024-02-25 RX ADMIN — IRON SUCROSE 200 MG: 20 INJECTION, SOLUTION INTRAVENOUS at 16:19

## 2024-02-25 RX ADMIN — Medication 1 TABLET: at 09:33

## 2024-02-25 RX ADMIN — HEPARIN SODIUM 600 UNITS/HR: 10000 INJECTION, SOLUTION INTRAVENOUS at 06:07

## 2024-02-25 RX ADMIN — LOSARTAN POTASSIUM 50 MG: 50 TABLET, FILM COATED ORAL at 09:33

## 2024-02-25 RX ADMIN — LOSARTAN POTASSIUM 50 MG: 50 TABLET, FILM COATED ORAL at 20:45

## 2024-02-25 RX ADMIN — HEPARIN SODIUM 2000 UNITS: 5000 INJECTION INTRAVENOUS; SUBCUTANEOUS at 11:25

## 2024-02-25 RX ADMIN — MESALAMINE 1500 MG: 500 CAPSULE ORAL at 09:33

## 2024-02-25 RX ADMIN — FIDAXOMICIN 200 MG: 200 TABLET, FILM COATED ORAL at 09:33

## 2024-02-25 RX ADMIN — FIDAXOMICIN 200 MG: 200 TABLET, FILM COATED ORAL at 20:45

## 2024-02-25 ASSESSMENT — COGNITIVE AND FUNCTIONAL STATUS - GENERAL
DRESSING REGULAR LOWER BODY CLOTHING: A LITTLE
PERSONAL GROOMING: A LITTLE
DRESSING REGULAR UPPER BODY CLOTHING: A LITTLE
MOBILITY SCORE: 23
HELP NEEDED FOR BATHING: A LITTLE
CLIMB 3 TO 5 STEPS WITH RAILING: A LITTLE
EATING MEALS: A LITTLE
TOILETING: A LITTLE
DAILY ACTIVITIY SCORE: 18

## 2024-02-25 ASSESSMENT — PAIN SCALES - GENERAL
PAINLEVEL_OUTOF10: 0 - NO PAIN

## 2024-02-25 NOTE — PROGRESS NOTES
Department of Internal Medicine  Gastroenterology  Progress note      Subjective  GI is following for ulcerative colitis and C. difficile infection    Today, she denies any abdominal pain, nausea, or vomiting.  She states she had 4 loose stools so far today mostly small watery dark brown stools.  No hematochezia noted.  She does note she had 4 bowel movements as well overnight.  She states typically her bowel movements are worse with eating.      Current Medication    Current Facility-Administered Medications:     acetaminophen (Tylenol) tablet 650 mg, 650 mg, oral, q4h PRN **OR** acetaminophen (Tylenol) oral liquid 650 mg, 650 mg, nasogastric tube, q4h PRN **OR** acetaminophen (Tylenol) suppository 650 mg, 650 mg, rectal, q4h PRN, Jose Noriega MD    [Held by provider] aspirin EC tablet 81 mg, 81 mg, oral, Daily, Jose Noriega MD    benzocaine-menthol (Cepastat Sore Throat) 15-3.6 mg lozenge 1 lozenge, 1 lozenge, Mouth/Throat, q2h PRN, Jose Noriega MD    diphenhydrAMINE (BENADryl) injection 25 mg, 25 mg, intravenous, q6h PRN, Jose Noriega MD    fidaxomicin (Dificid) tablet 200 mg, 200 mg, oral, BID, Lita Chow DO, 200 mg at 02/24/24 2052    heparin (porcine) injection 2,000-4,000 Units, 2,000-4,000 Units, intravenous, q4h PRN, Krupa Peralta MD    heparin 25,000 Units in dextrose 5% 250 mL (100 Units/mL) infusion (premix), 0-4,500 Units/hr, intravenous, Continuous, Krupa Peralta MD, Last Rate: 6 mL/hr at 02/25/24 0607, 600 Units/hr at 02/25/24 0607    hydrALAZINE (Apresoline) injection 5 mg, 5 mg, intravenous, q8h PRN, Lita Chow DO    lactobacillus acidophilus tablet 1 tablet, 1 tablet, oral, Daily, Lita Chow DO, 1 tablet at 02/24/24 1238    losartan (Cozaar) tablet 50 mg, 50 mg, oral, BID, Jose Noriega MD, 50 mg at 02/24/24 2052    melatonin tablet 3 mg, 3 mg, oral, Daily, Jose Noriega MD    mesalamine (Pentasa) ER capsule 1,000 mg, 1,000 mg, oral,  Daily before evening meal, Krupa Peralta MD, 1,000 mg at 02/24/24 1706    mesalamine (Pentasa) ER capsule 1,500 mg, 1,500 mg, oral, BID, Krupa Peralta MD, 1,500 mg at 02/24/24 0809    polyethylene glycol (Glycolax, Miralax) packet 17 g, 17 g, oral, Daily PRN, Jose Noriega MD    Past Medical History  Active Ambulatory Problems     Diagnosis Date Noted    Acute bacterial sinusitis 04/03/2023    Acute deep vein thrombosis (DVT) of calf muscle vein of left lower extremity (CMS/[facility]) 04/03/2023    Anemia 04/03/2023    Benign essential hypertension 04/03/2023    Cat bite 04/03/2023    Cerebrovascular accident (CMS/[facility]) 04/03/2023    Chest pain, atypical 04/03/2023    Chronic obstructive pulmonary disease (CMS/[facility]) 04/03/2023    Ulcerative colitis (CMS/HCC) 04/03/2023    COVID-19 04/03/2023    Cystocele with rectocele 04/03/2023    Hematuria 04/03/2023    Hyperlipidemia 04/03/2023    Mitral regurgitation 04/03/2023    Palpitations 04/03/2023    Right wrist pain 04/03/2023    Seasonal allergic rhinitis 04/03/2023    Situational anxiety 04/03/2023    Throat pain 04/03/2023    Tick bite 04/03/2023    Urinary frequency 04/03/2023    Discharge of vagina 04/03/2023    Vaginal bleeding 04/03/2023    Venous insufficiency, peripheral 04/03/2023    Arthritis 09/22/2022    Difficulty walking 07/11/2023    Electrocardiogram abnormal 07/11/2023    Gastroesophageal reflux disease 03/16/2023    Hair loss 10/09/2018    History of anemia 07/11/2023    Iron deficiency anemia 07/11/2023    Left lower quadrant pain 09/17/2020    Leukocytosis 07/11/2023    Urinary hesitancy 10/09/2020    Vitamin D deficiency 07/11/2023    Closed fracture of left hip with routine healing 08/31/2023    Closed fracture of lower end of radius with routine healing 08/31/2023    Dysarthria and anarthria 12/30/2022    Dysphagia, oropharyngeal phase 12/30/2022    Hemiplegia and hemiparesis following cerebral infarction affecting left non-dominant side  (CMS/[facility]) 12/30/2022    Muscle weakness (generalized) 12/30/2022    Occlusion and stenosis of bilateral carotid arteries 12/30/2022    Personal history of other venous thrombosis and embolism 12/30/2022    Acute diarrhea 11/19/2023    Diarrhea of presumed infectious origin 12/01/2023    Bloody stools 11/19/2023    C. difficile colitis 12/03/2023    Failure to thrive in adult 12/01/2023    History of Clostridium difficile infection 12/08/2023    Hypokalemia 12/02/2023    Severe protein-calorie malnutrition (CMS/[facility]) 12/02/2023    Varicose veins of lower extremity with inflammation 12/15/2023    Weight loss, unintentional 12/01/2023    Female cystocele 08/19/2020    Diarrhea 12/26/2023    Elevated LFTs 01/08/2024    History of cerebrovascular accident 02/05/2024    Left hemiparesis (CMS/[facility]) 02/05/2024    TB lung, latent 12/29/2023    Esophageal candidiasis (CMS/HCC) 02/14/2024     Resolved Ambulatory Problems     Diagnosis Date Noted    A-fib (CMS/[facility]) 04/03/2023     Past Medical History:   Diagnosis Date    Personal history of other diseases of the circulatory system     Personal history of other specified conditions     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        PHYSICAL EXAM  VS: /63 (BP Location: Right arm, Patient Position: Lying)   Pulse 70   Temp 37.2 °C (99 °F) (Temporal)   Resp 14   Ht 1.524 m (5')   Wt (!) 40.5 kg (89 lb 4.6 oz)   SpO2 96%   BMI 17.44 kg/m²  Body mass index is 17.44 kg/m².  Physical Exam  Constitutional:       General: She is not in acute distress.     Appearance: Normal appearance.   HENT:      Mouth/Throat:      Mouth: Mucous membranes are moist.      Pharynx: Oropharynx is clear.   Eyes:      General: No scleral icterus.     Extraocular Movements: Extraocular movements intact.      Conjunctiva/sclera: Conjunctivae normal.      Pupils: Pupils are equal, round, and reactive to light.   Cardiovascular:      Rate and Rhythm: Normal rate and regular  rhythm.      Heart sounds: No murmur heard.  Pulmonary:      Effort: Pulmonary effort is normal.      Breath sounds: No wheezing or rhonchi.   Abdominal:      General: Bowel sounds are normal. There is no distension.      Palpations: Abdomen is soft. There is no mass.      Tenderness: There is no abdominal tenderness.      Hernia: No hernia is present.   Musculoskeletal:         General: No swelling or deformity.   Skin:     General: Skin is warm.      Coloration: Skin is not jaundiced.   Neurological:      General: No focal deficit present.      Mental Status: She is alert and oriented to person, place, and time.   Psychiatric:         Mood and Affect: Mood normal.          DATA  Recent blood work was reviewed and discussed with the patient   Results from last 7 days   Lab Units 02/25/24  0505   WBC AUTO x10*3/uL 9.3   RBC AUTO x10*6/uL 2.49*   HEMOGLOBIN g/dL 7.5*   HEMATOCRIT % 24.4*   MCV fL 98   MCHC g/dL 30.7*   RDW % 14.0   PLATELETS AUTO x10*3/uL 534*       Results from last 72 hours   Lab Units 02/25/24  0505 02/24/24  0512   SODIUM mmol/L 139 136   POTASSIUM mmol/L 3.8 3.7   CHLORIDE mmol/L 108* 105   CO2 mmol/L 27 25   BUN mg/dL 17 19   CREATININE mg/dL 0.58 0.68   CALCIUM mg/dL 7.7* 8.2*   PROTEIN TOTAL g/dL  --  4.8*   BILIRUBIN TOTAL mg/dL  --  0.2   ALK PHOS U/L  --  86   AST U/L  --  12   ALT U/L  --  9       Results from last 72 hours   Lab Units 02/23/24  1719   INR  1.0      Latest Reference Range & Units 02/23/24 07:44   C. difficile, PCR Not Detected  Detected !   C. difficile (Toxin A/B) Negative, Indeterminate  Positive !      Latest Reference Range & Units 02/23/24 07:44   Yersinia enterocolitica Not Detected  Not Detected   Salmonella species Not Detected  Not Detected   Shigella species Not Detected  Not Detected   Vibrio Group Not Detected  Not Detected   Shiga Toxin 1 Not Detected  Not Detected   Shiga Toxin 2 Not Detected  Not Detected   Norovirus GI/GII Not Detected  Not Detected    Rotavirus A Not Detected  Not Detected       RADIOLOGY REVIEW  N/A    ENDOSCOPIC REVIEW  N/A    IMPRESSION/RECOMMENDATIONS  This is a 77 yo Female with a PMH of UC (diagnosed in 1989 and was maintained on Pentasa), esophageal candidiasis (1/24), HTN, HLD, , ?Afib (not on AC), DVT, stroke, C diff (9/23 treated with Vanco), and latent TB who presented to [facility] on 2/22/24 with reports of anemia.     Hematochezia with C diff- hgb(2/25)7.5, baseline 10-11, s/p 1uPRBC.  Improved with no signs of bleeding despite heparin drip  Ulcerative colitis - on Pentasa, follow-up with Dr. Scooby Talavera @ [facility] . Colonoscopy on 12/5/23 @ [facility] showed diffuse chronic active pancolitis.  Outpatient plan to start Entyvio on outpatient after starting for isoniazid latent TB, prefer to avoid steroids as they decrease efficacy   Acute PE, RLL subsegmental    PLAN  No emergent or urgent endoscopic evaluation  Currently on Dificid 200 mg twice daily  Recommend use of Zinplava or Vowst   Currently on a heparin drip  Currently on a cardiac diet  Continue supportive care per primary team    Discussed with Dr Green GI to sign off, please call with questions or concerns    (Electronically signed byPatience Gross PA-C on 2/25/2024 at 8:50 AM)

## 2024-02-25 NOTE — CARE PLAN
Problem: Safety  Goal: Patient will be injury free during hospitalization  Outcome: Progressing

## 2024-02-25 NOTE — CARE PLAN
The patient's goals for the shift include      The clinical goals for the shift include labs remain wnl        Problem: Safety  Goal: Patient will be injury free during hospitalization  Outcome: Progressing

## 2024-02-25 NOTE — PROGRESS NOTES
Hospital Medicine Progress Note    Subjective:  Janice Ko is a 78 y.o. female, who is hospital day 3.     Patient feels better today, still having multiple Bms but they appear to be slowing down some. Eating better today. No additional complaints.     Objective:    /63 (BP Location: Right arm, Patient Position: Lying)   Pulse 70   Temp 37.2 °C (99 °F) (Temporal)   Resp 14   Ht 1.524 m (5')   Wt (!) 40.5 kg (89 lb 4.6 oz)   SpO2 96%   BMI 17.44 kg/m²     Physical Exam:  GENERAL: A&Ox3, no distress, alert and cooperative  HEENT: NC/AT, EOMI, clear sclera, MMM  NECK: Supple, no JVD  CARDIOVASCULAR: RRR, no murmurs. S1/S2  RESPIRATORY: CTAB, no RRW or crackles. No distress  ABDOMEN: Soft, ND, non-tender. No rebound or guarding. BS+  EXTREMITIES: No peripheral edema or wounds  NEUROLOGICAL: A&Ox3. CN II-XII grossly intact. No focal deficits  SKIN: Warm and dry, no rashes  PSYCH: appropriate mood and affect, anxious    I personally reviewed all imaging, labs and notes/ documentation.     Assessment & Plan:    Acute PE, RLL subsegmental  Ulcerative colitis flare, mild  Recurrent C dif infection  Acute on chronic anemia     HTN, HLD  COPD  Esophageal candidiasis (dx 1/24)  Latent tuberculosis  ?A fib not on AC  Hx DVT  Stroke    Heparin ggt, if hgb remains stable will transition to oral AC tomorrow, monitor for any bleeding, needs hypercoagulable workup outpatient, discussed this with pt & son at bedside  EGD on 2/23 without signs of overt bleeding  Given 1 unit pRBCs on admission & developed transfusion reaction. Will give dose of IV venofer  Continue home mesalamine, plan to start Entyvio outpatient after latent TB tx per her GI specialist  C dif positive, recurrent infection, start dificid day 2/10 as pt was tx with dificid recently with improvement, consult ID, GI recommending Zinplava, will discuss with ID as it appears only ID can order it here  Holding home aspirin, will resume as able  Home  losartan resumed, BP better controlled  Patient follows with ID Dr. Kirsten Prather (), was supposed to start isoniazid on 2/26 but this will likely be deferred given C dif recurrence, defer final decision to pt's ID  Continue other home meds    DVT Prophylaxis: heparin ggt, SCDs  Code Status: Full Code   Disposition: jose Chow,   Internal Medicine/ Hospitalist

## 2024-02-26 LAB
ANION GAP SERPL CALC-SCNC: 10 MMOL/L (ref 10–20)
BUN SERPL-MCNC: 12 MG/DL (ref 6–23)
CALCIUM SERPL-MCNC: 8.1 MG/DL (ref 8.6–10.3)
CHLORIDE SERPL-SCNC: 107 MMOL/L (ref 98–107)
CO2 SERPL-SCNC: 26 MMOL/L (ref 21–32)
CREAT SERPL-MCNC: 0.66 MG/DL (ref 0.5–1.05)
EGFRCR SERPLBLD CKD-EPI 2021: 89 ML/MIN/1.73M*2
ERYTHROCYTE [DISTWIDTH] IN BLOOD BY AUTOMATED COUNT: 14.1 % (ref 11.5–14.5)
GLUCOSE SERPL-MCNC: 79 MG/DL (ref 74–99)
HCT VFR BLD AUTO: 25.1 % (ref 36–46)
HGB BLD-MCNC: 7.7 G/DL (ref 12–16)
HOLD SPECIMEN: NORMAL
MCH RBC QN AUTO: 30.8 PG (ref 26–34)
MCHC RBC AUTO-ENTMCNC: 30.7 G/DL (ref 32–36)
MCV RBC AUTO: 100 FL (ref 80–100)
NRBC BLD-RTO: 0 /100 WBCS (ref 0–0)
PLATELET # BLD AUTO: 517 X10*3/UL (ref 150–450)
POTASSIUM SERPL-SCNC: 4.2 MMOL/L (ref 3.5–5.3)
RBC # BLD AUTO: 2.5 X10*6/UL (ref 4–5.2)
SODIUM SERPL-SCNC: 139 MMOL/L (ref 136–145)
UFH PPP CHRO-ACNC: 0.3 IU/ML
WBC # BLD AUTO: 8.8 X10*3/UL (ref 4.4–11.3)

## 2024-02-26 PROCEDURE — 85027 COMPLETE CBC AUTOMATED: CPT | Performed by: INTERNAL MEDICINE

## 2024-02-26 PROCEDURE — 36415 COLL VENOUS BLD VENIPUNCTURE: CPT

## 2024-02-26 PROCEDURE — 1200000002 HC GENERAL ROOM WITH TELEMETRY DAILY

## 2024-02-26 PROCEDURE — 2500000001 HC RX 250 WO HCPCS SELF ADMINISTERED DRUGS (ALT 637 FOR MEDICARE OP): Performed by: INTERNAL MEDICINE

## 2024-02-26 PROCEDURE — 2500000001 HC RX 250 WO HCPCS SELF ADMINISTERED DRUGS (ALT 637 FOR MEDICARE OP)

## 2024-02-26 PROCEDURE — 99232 SBSQ HOSP IP/OBS MODERATE 35: CPT

## 2024-02-26 PROCEDURE — 80048 BASIC METABOLIC PNL TOTAL CA: CPT | Performed by: INTERNAL MEDICINE

## 2024-02-26 PROCEDURE — 36415 COLL VENOUS BLD VENIPUNCTURE: CPT | Performed by: INTERNAL MEDICINE

## 2024-02-26 PROCEDURE — 85520 HEPARIN ASSAY: CPT

## 2024-02-26 RX ADMIN — LOSARTAN POTASSIUM 50 MG: 50 TABLET, FILM COATED ORAL at 21:24

## 2024-02-26 RX ADMIN — MESALAMINE 1000 MG: 500 CAPSULE ORAL at 16:16

## 2024-02-26 RX ADMIN — APIXABAN 10 MG: 5 TABLET, FILM COATED ORAL at 21:23

## 2024-02-26 RX ADMIN — FIDAXOMICIN 200 MG: 200 TABLET, FILM COATED ORAL at 09:42

## 2024-02-26 RX ADMIN — Medication 1 TABLET: at 09:42

## 2024-02-26 RX ADMIN — LOSARTAN POTASSIUM 50 MG: 50 TABLET, FILM COATED ORAL at 09:42

## 2024-02-26 RX ADMIN — APIXABAN 10 MG: 5 TABLET, FILM COATED ORAL at 09:41

## 2024-02-26 RX ADMIN — MESALAMINE 1500 MG: 500 CAPSULE ORAL at 09:42

## 2024-02-26 RX ADMIN — FIDAXOMICIN 200 MG: 200 TABLET, FILM COATED ORAL at 21:24

## 2024-02-26 ASSESSMENT — COGNITIVE AND FUNCTIONAL STATUS - GENERAL
MOBILITY SCORE: 23
HELP NEEDED FOR BATHING: A LITTLE
DRESSING REGULAR UPPER BODY CLOTHING: A LITTLE
CLIMB 3 TO 5 STEPS WITH RAILING: A LITTLE
DRESSING REGULAR LOWER BODY CLOTHING: A LITTLE
MOBILITY SCORE: 23
DRESSING REGULAR LOWER BODY CLOTHING: A LITTLE
PERSONAL GROOMING: A LITTLE
DRESSING REGULAR UPPER BODY CLOTHING: A LITTLE
EATING MEALS: A LITTLE
DAILY ACTIVITIY SCORE: 18
DAILY ACTIVITIY SCORE: 18
PERSONAL GROOMING: A LITTLE
TOILETING: A LITTLE
CLIMB 3 TO 5 STEPS WITH RAILING: A LITTLE
TOILETING: A LITTLE
HELP NEEDED FOR BATHING: A LITTLE
EATING MEALS: A LITTLE

## 2024-02-26 ASSESSMENT — PAIN SCALES - GENERAL
PAINLEVEL_OUTOF10: 0 - NO PAIN
PAINLEVEL_OUTOF10: 0 - NO PAIN

## 2024-02-26 ASSESSMENT — PAIN - FUNCTIONAL ASSESSMENT: PAIN_FUNCTIONAL_ASSESSMENT: 0-10

## 2024-02-26 NOTE — CARE PLAN
The patient's goals for the shift include      The clinical goals for the shift include labs wnl      Problem: Safety  Goal: Patient will be injury free during hospitalization  Outcome: Progressing

## 2024-02-26 NOTE — PROGRESS NOTES
Janice Ko is a 78 y.o. female on day 4 of admission presenting with Gastrointestinal hemorrhage with melena.    Subjective   Patient remains on oral Dificid day 3 for treatment of recurrent C. difficile.  She continues to have multiple loose bowel movements per day but states they are beginning to become more solid.  She denies any fevers or chills or abdominal pain.  Remainder review of systems is unremarkable.         Objective     Physical Exam  Constitutional:       General: She is not in acute distress.  HENT:      Head: Normocephalic and atraumatic.      Right Ear: External ear normal.      Left Ear: External ear normal.      Nose: Nose normal.      Mouth/Throat:      Mouth: Mucous membranes are dry.   Eyes:      Extraocular Movements: Extraocular movements intact.      Pupils: Pupils are equal, round, and reactive to light.   Cardiovascular:      Rate and Rhythm: Normal rate and regular rhythm.   Pulmonary:      Effort: Pulmonary effort is normal. No respiratory distress.   Abdominal:      General: Abdomen is flat.      Palpations: Abdomen is soft.   Musculoskeletal:         General: No deformity or signs of injury.      Cervical back: Normal range of motion and neck supple.   Skin:     General: Skin is warm and dry.      Capillary Refill: Capillary refill takes 2 to 3 seconds.   Neurological:      General: No focal deficit present.      Mental Status: She is alert. Mental status is at baseline.   Psychiatric:         Mood and Affect: Mood normal.         Behavior: Behavior normal.         Last Recorded Vitals  Blood pressure 162/71, pulse 81, temperature 36.4 °C (97.5 °F), temperature source Temporal, resp. rate 18, height 1.524 m (5'), weight (!) 40.5 kg (89 lb 4.6 oz), SpO2 98 %.  Intake/Output last 3 Shifts:  I/O last 3 completed shifts:  In: 72 (1.8 mL/kg) [I.V.:72 (1.8 mL/kg)]  Out: - (0 mL/kg)   Weight: 40.5 kg     Relevant Results    Electrocardiogram, 12-lead PRN ACS symptoms    Result Date:  2/24/2024  Sinus rhythm Probable left atrial enlargement See ED provider note for full interpretation and clinical correlation Confirmed by Magali Rodriguez ([id number]) on 2/24/2024 7:44:32 PM    EGD    Addendum Date: 2/23/2024    Impression The esophagus appeared normal. The stomach appeared normal. The duodenum appeared normal. Findings The esophagus appeared normal. The stomach appeared normal. The duodenum appeared normal. Recommendation  -No bleeding or stigmata of recent bleeding on this exam. Anemia is largely due to chronic inflammation from IBD -Recommend starting anticoagulation for management of acute PE. Patient's in IBD flare are hypercoagulable and patient at risk of further clot propagation -outpatient follow up with primary GI (Dr. Talavera) through [facility]. Patient already planned for initiation of Entyvio after course of INH for latent TB. She will likely require supportive IV iron transfusions outpatient until UC is better controlled -No plans for inpatient Flex sig or biologic initiation at this time  Indication Melena Staff Staff Role Davida Green MD Proceduralist Medications See Anesthesia Record. Preprocedure A history and physical has been performed, and patient medication allergies have been reviewed. The patient's tolerance of previous anesthesia has been reviewed. The risks and benefits of the procedure and the sedation options and risks were discussed with the patient. All questions were answered and informed consent obtained. Details of the Procedure The patient underwent monitored anesthesia care, which was administered by an anesthesia professional. The patient's blood pressure, ECG, ETCO2, heart rate, level of consciousness, oxygen and respirations were monitored throughout the procedure. The scope was introduced through the mouth and advanced to the second part of the duodenum. Retroflexion was performed in the cardia. Prior to the procedure, the patient's H. Pylori status was  unknown. The patient's estimated blood loss was minimal (<5 mL). The procedure was not difficult. The patient tolerated the procedure well. There were no apparent adverse events. Events Procedure Events Event Event Time ENDO SCOPE IN TIME 2/23/2024 12:11 PM ENDO SCOPE OUT TIME 2/23/2024 12:13 PM Specimens No specimens collected Procedure Location [facility] [address] [facility] [address] [phone] Referring Provider No referring provider defined for this encounter. Procedure Provider No name on file    Result Date: 2/23/2024  Table formatting from the original result was not included. Impression The esophagus appeared normal. The stomach appeared normal. The duodenum appeared normal. Findings The esophagus appeared normal. The stomach appeared normal. The duodenum appeared normal. Recommendation  -No bleeding or stigmata of recent bleeding on this exam. Anemia is largely due to chronic inflammation from IBD -Recommend starting anticoagulation for management of acute PE. Patient's in IBD flare are hypercoagulable and patient at risk of further clot propagation -outpatient follow up with primary GI (Dr. Talavera) through [facility]. Patient already planned for initiation of Entyvio after course of INH for latent TB  Indication Melena Staff Staff Role Davida Green MD Proceduralist Medications See Anesthesia Record. Preprocedure A history and physical has been performed, and patient medication allergies have been reviewed. The patient's tolerance of previous anesthesia has been reviewed. The risks and benefits of the procedure and the sedation options and risks were discussed with the patient. All questions were answered and informed consent obtained. Details of the Procedure The patient underwent monitored anesthesia care, which was administered by an anesthesia professional. The patient's blood pressure, ECG, ETCO2, heart rate, level of consciousness, oxygen and  respirations were monitored throughout the procedure. The scope was introduced through the mouth and advanced to the second part of the duodenum. Retroflexion was performed in the cardia. Prior to the procedure, the patient's H. Pylori status was unknown. The patient's estimated blood loss was minimal (<5 mL). The procedure was not difficult. The patient tolerated the procedure well. There were no apparent adverse events. Events Procedure Events Event Event Time ENDO SCOPE IN TIME 2/23/2024 12:11 PM ENDO SCOPE OUT TIME 2/23/2024 12:13 PM Specimens No specimens collected Procedure Location [facility] [address] [facility] [address] [phone] Referring Provider No referring provider defined for this encounter. Procedure Provider No name on file     CT angio chest for pulmonary embolism    Result Date: 2/22/2024  Interpreted By:  Ronald Melendez, STUDY: CT ANGIO CHEST FOR PULMONARY EMBOLISM;  2/22/2024 10:43 pm   INDICATION: Signs/Symptoms:Pleuritic right-sided chest pain minimally reproducible to palpation.   COMPARISON: None   ACCESSION NUMBER(S): [id number]   ORDERING CLINICIAN: BENSON BELL   TECHNIQUE: Helical data acquisition of the chest was obtained after intravenous administration of intravenous contrast as per PE protocol. Images were reformatted in coronal and sagittal planes. Axial and coronal maximum intensity projection (MIP) images were created and reviewed.   FINDINGS: Scoliosis. The heart size is enlarged. Coronary calcifications. No pericardial effusion.   Findings suspicious for a tiny nonocclusive pulmonary embolism seen in the right lower lobe subsegmental branches on image 178. No evidence of saddle embolism. No features of right heart strain. Ascending aorta measures 3.1 cm. Patchy left basilar atelectasis.   Small sliding hiatal hernia.   Slight wedging of the superior endplate of T5 which appears chronic.   Imaging of the upper abdomen shows ectatic  abdominal aorta with calcifications of the origin of both renal arteries.   No pneumothorax. No pleural effusions.   Subtle nodular infiltrate seen in the right lower lobe on image 164 and image 159 which could be inflammatory. Imaging of the upper abdomen shows some wall thickening of the splenic flexure suspicious for mild colitis.       1. Findings suspicious for a small subsegmental right lower lobe pulmonary embolism. No evidence of right heart strain. Additionally in the right lower lobe there are few small inflammatory nodules suspicious for mild pneumonitis. 2. Marked scoliosis. Heart size is enlarged. Patchy left basilar atelectasis demineralization of the bones.   3. Not completely evaluated but findings suspicious for a mild splenic flexure colitis.   MACRO: Ronald Melendez discussed the significance and urgency of this critical finding by telephone with  BENSON BELL on 2/22/2024 at 11:52 pm. (**-RCF-**) Findings:  See findings.   Signed by: Ronald Melendez 2/22/2024 11:52 PM Dictation workstation:   [id number]    XR ribs right 2 views    Result Date: 2/22/2024  Interpreted By:  Marielos Rolle, STUDY: XR RIBS RIGHT 2 VIEWS; ;  2/22/2024 8:28 pm   INDICATION: Signs/Symptoms:Possible Rib Fractures.   COMPARISON: None.   ACCESSION NUMBER(S): [id number]   ORDERING CLINICIAN: MYLA HUFFMAN   FINDINGS: Slightly limited examination due to osteopenia. Questionable acute nondisplaced fracture of the right anterior 7th rib. No displaced rib fracture. The imaged lung is clear.       Questionable acute nondisplaced right anterior 7th rib fracture. Please correlate for point tenderness.     MACRO: None   Signed by: Marielos Rolle 2/22/2024 9:09 PM Dictation workstation:   [id number]    XR chest 1 view    Result Date: 2/22/2024  Interpreted By:  Anaid Hammond, STUDY: XR CHEST 1 VIEW;  2/22/2024 7:29 pm   INDICATION: Signs/Symptoms:chest pain   COMPARISON: Chest x-ray 03/15/2021 01/16/2008.   ACCESSION NUMBER(S):  [id number]   ORDERING CLINICIAN: BENSON BELL   TECHNIQUE: Portable upright frontal view of the chest was obtained .   FINDINGS: The cardiomediastinal silhouette is within normal limits. Atherosclerotic calcifications are noted at the thoracic aorta.   There is blunting of the left costophrenic angle suggestive of a small left pleural effusion. There is subsegmental atelectasis at the left midlung and left lung base. No pneumothorax.   There is thoracolumbar scoliosis.       1.  Small left pleural effusion. Subsegmental atelectasis at the left lung.       MACRO: None.   Signed by: Anaid Hammond 2/22/2024 8:13 PM Dictation workstation:   [id number]      Results for orders placed or performed during the hospital encounter of 02/22/24 (from the past 24 hour(s))   Heparin Assay   Result Value Ref Range    Heparin Unfractionated 0.4 See Comment Below for Therapeutic Ranges IU/mL   Heparin Assay   Result Value Ref Range    Heparin Unfractionated 0.4 See Comment Below for Therapeutic Ranges IU/mL   CBC   Result Value Ref Range    WBC 8.8 4.4 - 11.3 x10*3/uL    nRBC 0.0 0.0 - 0.0 /100 WBCs    RBC 2.50 (L) 4.00 - 5.20 x10*6/uL    Hemoglobin 7.7 (L) 12.0 - 16.0 g/dL    Hematocrit 25.1 (L) 36.0 - 46.0 %     80 - 100 fL    MCH 30.8 26.0 - 34.0 pg    MCHC 30.7 (L) 32.0 - 36.0 g/dL    RDW 14.1 11.5 - 14.5 %    Platelets 517 (H) 150 - 450 x10*3/uL   Basic metabolic panel   Result Value Ref Range    Glucose 79 74 - 99 mg/dL    Sodium 139 136 - 145 mmol/L    Potassium 4.2 3.5 - 5.3 mmol/L    Chloride 107 98 - 107 mmol/L    Bicarbonate 26 21 - 32 mmol/L    Anion Gap 10 10 - 20 mmol/L    Urea Nitrogen 12 6 - 23 mg/dL    Creatinine 0.66 0.50 - 1.05 mg/dL    eGFR 89 >60 mL/min/1.73m*2    Calcium 8.1 (L) 8.6 - 10.3 mg/dL   Light Blue Top   Result Value Ref Range    Extra Tube Hold for add-ons.    Heparin Assay, UFH   Result Value Ref Range    Heparin Unfractionated 0.3 See Comment Below for Therapeutic Ranges IU/mL         Assessment/Plan     #Recurrent C. difficile  #History of ulcerative colitis  #Acute on chronic anemia  #History of latent TB  #History of DVT    Antibiotics  Dificid day 3 out of 10    -Continue Dificid for period of 10 days  -Patient can follow-up with her infectious disease doctor at [facility] who can discuss starting her on INH/B6 and VOWST    I reviewed and interpreted all lab test imaging studies and documentations from other healthcare providers  I am monitoring for antibiotic side effects and toxicity    Jose Worrell,

## 2024-02-26 NOTE — NURSING NOTE
This patient is super concerned that a GIB is starting because she just started her eliquis today. I was called into the room and she made it sound VERY urgent and I went in and there was a very small smear of blood from when she wiped. I told her that is nothing to be concerned of currently, I would think that would be from a hemorrhoid before anything else. She yelled at me and said she does not have hemorrhoids and I asked if I can examine her and she refused. I told her we will watch it and that we will be watching her labs and if anything comes of it we will take care of it. Will continue to monitor for any signs and symptoms of bleeding.

## 2024-02-27 VITALS
RESPIRATION RATE: 20 BRPM | OXYGEN SATURATION: 95 % | SYSTOLIC BLOOD PRESSURE: 163 MMHG | HEIGHT: 60 IN | BODY MASS INDEX: 17.53 KG/M2 | HEART RATE: 100 BPM | WEIGHT: 89.29 LBS | TEMPERATURE: 97.9 F | DIASTOLIC BLOOD PRESSURE: 72 MMHG

## 2024-02-27 LAB
ANION GAP SERPL CALC-SCNC: 11 MMOL/L (ref 10–20)
BUN SERPL-MCNC: 13 MG/DL (ref 6–23)
CALCIUM SERPL-MCNC: 7.9 MG/DL (ref 8.6–10.3)
CHLORIDE SERPL-SCNC: 107 MMOL/L (ref 98–107)
CO2 SERPL-SCNC: 25 MMOL/L (ref 21–32)
CREAT SERPL-MCNC: 0.57 MG/DL (ref 0.5–1.05)
EGFRCR SERPLBLD CKD-EPI 2021: >90 ML/MIN/1.73M*2
ERYTHROCYTE [DISTWIDTH] IN BLOOD BY AUTOMATED COUNT: 14 % (ref 11.5–14.5)
GLUCOSE SERPL-MCNC: 81 MG/DL (ref 74–99)
HCT VFR BLD AUTO: 25.2 % (ref 36–46)
HGB BLD-MCNC: 7.7 G/DL (ref 12–16)
MAGNESIUM SERPL-MCNC: 1.92 MG/DL (ref 1.6–2.4)
MCH RBC QN AUTO: 30.7 PG (ref 26–34)
MCHC RBC AUTO-ENTMCNC: 30.6 G/DL (ref 32–36)
MCV RBC AUTO: 100 FL (ref 80–100)
NRBC BLD-RTO: 0 /100 WBCS (ref 0–0)
PLATELET # BLD AUTO: 530 X10*3/UL (ref 150–450)
POTASSIUM SERPL-SCNC: 4.2 MMOL/L (ref 3.5–5.3)
RBC # BLD AUTO: 2.51 X10*6/UL (ref 4–5.2)
SODIUM SERPL-SCNC: 139 MMOL/L (ref 136–145)
WBC # BLD AUTO: 9.3 X10*3/UL (ref 4.4–11.3)

## 2024-02-27 PROCEDURE — 83735 ASSAY OF MAGNESIUM: CPT

## 2024-02-27 PROCEDURE — 2500000001 HC RX 250 WO HCPCS SELF ADMINISTERED DRUGS (ALT 637 FOR MEDICARE OP): Performed by: INTERNAL MEDICINE

## 2024-02-27 PROCEDURE — 36415 COLL VENOUS BLD VENIPUNCTURE: CPT | Performed by: INTERNAL MEDICINE

## 2024-02-27 PROCEDURE — 85027 COMPLETE CBC AUTOMATED: CPT | Performed by: INTERNAL MEDICINE

## 2024-02-27 PROCEDURE — 2500000001 HC RX 250 WO HCPCS SELF ADMINISTERED DRUGS (ALT 637 FOR MEDICARE OP)

## 2024-02-27 PROCEDURE — 99239 HOSP IP/OBS DSCHRG MGMT >30: CPT | Performed by: INTERNAL MEDICINE

## 2024-02-27 PROCEDURE — 80048 BASIC METABOLIC PNL TOTAL CA: CPT | Performed by: INTERNAL MEDICINE

## 2024-02-27 RX ORDER — TRIAMCINOLONE ACETONIDE 1 MG/G
CREAM TOPICAL
Qty: 15 G | Refills: 0 | Status: SHIPPED | OUTPATIENT
Start: 2024-02-27 | End: 2024-02-27 | Stop reason: SDUPTHER

## 2024-02-27 RX ORDER — TRIAMCINOLONE ACETONIDE 1 MG/G
CREAM TOPICAL
Qty: 15 G | Refills: 0 | Status: SHIPPED | OUTPATIENT
Start: 2024-02-27 | End: 2024-04-27

## 2024-02-27 RX ADMIN — MESALAMINE 1500 MG: 500 CAPSULE ORAL at 10:17

## 2024-02-27 RX ADMIN — Medication 1 TABLET: at 10:17

## 2024-02-27 RX ADMIN — FIDAXOMICIN 200 MG: 200 TABLET, FILM COATED ORAL at 10:17

## 2024-02-27 RX ADMIN — MESALAMINE 1000 MG: 500 CAPSULE ORAL at 15:51

## 2024-02-27 RX ADMIN — LOSARTAN POTASSIUM 50 MG: 50 TABLET, FILM COATED ORAL at 10:17

## 2024-02-27 RX ADMIN — FIDAXOMICIN 200 MG: 200 TABLET, FILM COATED ORAL at 17:59

## 2024-02-27 RX ADMIN — APIXABAN 10 MG: 5 TABLET, FILM COATED ORAL at 10:17

## 2024-02-27 ASSESSMENT — PAIN SCALES - GENERAL: PAINLEVEL_OUTOF10: 0 - NO PAIN

## 2024-02-27 NOTE — PROGRESS NOTES
Met with pt again to provide her with one month free of Eliquis.   Pt will be discharged [facility] to [facility].  Anaid Duke RN [facility]

## 2024-02-27 NOTE — DISCHARGE SUMMARY
Discharge Diagnosis  Gastrointestinal hemorrhage with melena    Issues Requiring Follow-Up  Recurrent C diff infection  Fecal transplant  Anticoagulation for pulmonary embolism  Reintroduction of aspirin    Discharge Meds     Your medication list        START taking these medications        Instructions Last Dose Given Next Dose Due   apixaban 5 mg tablet  Commonly known as: Eliquis      Take 2 tablets (10 mg) by mouth 2 times a day for 14 doses.       apixaban 5 mg tablet  Commonly known as: Eliquis  Start taking on: March 4, 2024      Take 1 tablet (5 mg) by mouth 2 times a day. Do not start before March 4, 2024.       fidaxomicin 200 mg tablet  Commonly known as: Dificid      Take 1 tablet (200 mg) by mouth 2 times a day for 6 days.       triamcinolone 0.1 % cream  Commonly known as: Kenalog      Apply to affected area 1-2 times daily as needed.              CONTINUE taking these medications        Instructions Last Dose Given Next Dose Due   calcium carbonate-vitamin D3 250 mg-3.125 mcg (125 unit) tablet  Commonly known as: Oyster Shell           [facility] 10 billion cell -200 mg capsule, sprinkle  Generic drug: Lactobac. rhamnosus GG-inulin           ferrous sulfate (325 mg ferrous sulfate) tablet  Commonly known as: FerrouSuL      Take 1 tablet by mouth once daily with breakfast.       losartan 50 mg tablet  Commonly known as: Cozaar           multivitamin tablet           pantoprazole 40 mg EC tablet  Commonly known as: ProtoNix           Pentasa 500 mg ER capsule  Generic drug: mesalamine                  STOP taking these medications      aspirin 81 mg EC tablet        predniSONE 5 mg tablet  Commonly known as: Deltasone                  Where to Get Your Medications        These medications were sent to [facility]/pharmacy #[id number] - [place], [place] - [address]  [address] [place] [address]      Phone: [phone]   apixaban 5 mg tablet  apixaban 5 mg tablet  fidaxomicin 200 mg  tablet  triamcinolone 0.1 % cream         Test Results Pending At Discharge  Pending Labs       Order Current Status    Calprotectin Stool In process    Path Review-Transfusion Reaction In process            Hospital Course   Janice Ko is a 78 year old female with a history of DVT, stroke, latent TB, A-fib not on anticoagulation, ulcerative colitis, COPD, HTN who presents with low hemoglobin.  She was undergoing routine lab work prior to starting Entyvio for her ulcerative colitis.  She was found to have a pulmonary embolism and was admitted.  She was transfused for 1 unit RBC, but developed a transfusion reaction and was treated with Benadryl and steroids.  Patient was evaluated by GI who performed an EGD and found no active bleed.  They suspected that her rectal bleeding was from her ulcerative colitis, and recommended anticoagulation for her pulmonary embolism, citing that her active ulcerative colitis flare put her in a hypercoagulable state.  Patient was initially started on a heparin drip and was successfully transitioned to Eliquis.  Aspirin was held and we recommend reintroducing it safely in the outpatient setting.  Patient tested positive for C. difficile and was started on fidaxomicin by infectious disease.  She will be discharged with 6 days to complete a 10-day course.  We recommend that she completes her fidaxomicin course prior to starting her isoniazid course for her latent TB infection after which she can safely start on Entyvio for her ulcerative colitis.  We recommend that she follow-up with her primary care physician, her GI doctor, and her infectious disease doctor.    Pertinent Physical Exam At Time of Discharge  Physical Exam  Constitutional:       Appearance: Normal appearance.   HENT:      Head: Normocephalic and atraumatic.      Mouth/Throat:      Mouth: Mucous membranes are moist.   Eyes:      Extraocular Movements: Extraocular movements intact.   Cardiovascular:      Rate and Rhythm:  Normal rate and regular rhythm.   Pulmonary:      Effort: Pulmonary effort is normal.      Breath sounds: Normal breath sounds.   Abdominal:      General: Abdomen is flat. There is no distension.      Palpations: Abdomen is soft.   Musculoskeletal:      Right lower leg: No edema.      Left lower leg: No edema.   Skin:     General: Skin is warm and dry.   Neurological:      General: No focal deficit present.      Mental Status: She is alert and oriented to person, place, and time.   Psychiatric:         Mood and Affect: Mood normal.         Outpatient Follow-Up  Future Appointments   Date Time Provider Department Center   3/22/2024 11:15 AM Marcelo Correa MD [id number] [place]   5/6/2024 10:30 AM Javier Perez MD [id number] [place]         Krupa Peralta DO

## 2024-02-27 NOTE — PROGRESS NOTES
Janice Ko is a 78 y.o. female on day 5 of admission presenting with Gastrointestinal hemorrhage with melena.    Subjective   Patient remains on oral Dificid day 4 for treatment of recurrent C. difficile.  She continues to have multiple loose bowel movements per day but states they are beginning to become more solid.  She denies any fevers or chills or abdominal pain.  Remainder review of systems is unremarkable.         Objective     Physical Exam  Constitutional:       General: She is not in acute distress.  HENT:      Head: Normocephalic and atraumatic.      Right Ear: External ear normal.      Left Ear: External ear normal.      Nose: Nose normal.      Mouth/Throat:      Mouth: Mucous membranes are dry.   Eyes:      Extraocular Movements: Extraocular movements intact.      Pupils: Pupils are equal, round, and reactive to light.   Cardiovascular:      Rate and Rhythm: Normal rate and regular rhythm.   Pulmonary:      Effort: Pulmonary effort is normal. No respiratory distress.   Abdominal:      General: Abdomen is flat.      Palpations: Abdomen is soft.   Musculoskeletal:         General: No deformity or signs of injury.      Cervical back: Normal range of motion and neck supple.   Skin:     General: Skin is warm and dry.      Capillary Refill: Capillary refill takes 2 to 3 seconds.   Neurological:      General: No focal deficit present.      Mental Status: She is alert. Mental status is at baseline.   Psychiatric:         Mood and Affect: Mood normal.         Behavior: Behavior normal.         Last Recorded Vitals  Blood pressure 155/71, pulse 87, temperature 36.4 °C (97.5 °F), temperature source Temporal, resp. rate 20, height 1.524 m (5'), weight (!) 40.5 kg (89 lb 4.6 oz), SpO2 99 %.  Intake/Output last 3 Shifts:  No intake/output data recorded.    Relevant Results    Electrocardiogram, 12-lead PRN ACS symptoms    Result Date: 2/24/2024  Sinus rhythm Probable left atrial enlargement See ED provider note  for full interpretation and clinical correlation Confirmed by Magali Rodriguez ([id number]) on 2/24/2024 7:44:32 PM    EGD    Addendum Date: 2/23/2024    Impression The esophagus appeared normal. The stomach appeared normal. The duodenum appeared normal. Findings The esophagus appeared normal. The stomach appeared normal. The duodenum appeared normal. Recommendation  -No bleeding or stigmata of recent bleeding on this exam. Anemia is largely due to chronic inflammation from IBD -Recommend starting anticoagulation for management of acute PE. Patient's in IBD flare are hypercoagulable and patient at risk of further clot propagation -outpatient follow up with primary GI (Dr. Talavera) through [facility]. Patient already planned for initiation of Entyvio after course of INH for latent TB. She will likely require supportive IV iron transfusions outpatient until UC is better controlled -No plans for inpatient Flex sig or biologic initiation at this time  Indication Melena Staff Staff Role Davida Geren MD Proceduralist Medications See Anesthesia Record. Preprocedure A history and physical has been performed, and patient medication allergies have been reviewed. The patient's tolerance of previous anesthesia has been reviewed. The risks and benefits of the procedure and the sedation options and risks were discussed with the patient. All questions were answered and informed consent obtained. Details of the Procedure The patient underwent monitored anesthesia care, which was administered by an anesthesia professional. The patient's blood pressure, ECG, ETCO2, heart rate, level of consciousness, oxygen and respirations were monitored throughout the procedure. The scope was introduced through the mouth and advanced to the second part of the duodenum. Retroflexion was performed in the cardia. Prior to the procedure, the patient's H. Pylori status was unknown. The patient's estimated blood loss was minimal (<5 mL). The procedure was  not difficult. The patient tolerated the procedure well. There were no apparent adverse events. Events Procedure Events Event Event Time ENDO SCOPE IN TIME 2/23/2024 12:11 PM ENDO SCOPE OUT TIME 2/23/2024 12:13 PM Specimens No specimens collected Procedure Location [facility] [address] [facility] [address] [phone] Referring Provider No referring provider defined for this encounter. Procedure Provider No name on file    Result Date: 2/23/2024  Table formatting from the original result was not included. Impression The esophagus appeared normal. The stomach appeared normal. The duodenum appeared normal. Findings The esophagus appeared normal. The stomach appeared normal. The duodenum appeared normal. Recommendation  -No bleeding or stigmata of recent bleeding on this exam. Anemia is largely due to chronic inflammation from IBD -Recommend starting anticoagulation for management of acute PE. Patient's in IBD flare are hypercoagulable and patient at risk of further clot propagation -outpatient follow up with primary GI (Dr. Talavera) through [facility]. Patient already planned for initiation of Entyvio after course of INH for latent TB  Indication Melena Staff Staff Role Davida Green MD Proceduralist Medications See Anesthesia Record. Preprocedure A history and physical has been performed, and patient medication allergies have been reviewed. The patient's tolerance of previous anesthesia has been reviewed. The risks and benefits of the procedure and the sedation options and risks were discussed with the patient. All questions were answered and informed consent obtained. Details of the Procedure The patient underwent monitored anesthesia care, which was administered by an anesthesia professional. The patient's blood pressure, ECG, ETCO2, heart rate, level of consciousness, oxygen and respirations were monitored throughout the procedure. The scope was introduced through  the mouth and advanced to the second part of the duodenum. Retroflexion was performed in the cardia. Prior to the procedure, the patient's H. Pylori status was unknown. The patient's estimated blood loss was minimal (<5 mL). The procedure was not difficult. The patient tolerated the procedure well. There were no apparent adverse events. Events Procedure Events Event Event Time ENDO SCOPE IN TIME 2/23/2024 12:11 PM ENDO SCOPE OUT TIME 2/23/2024 12:13 PM Specimens No specimens collected Procedure Location [facility] [address] [facility] [address] [phone] Referring Provider No referring provider defined for this encounter. Procedure Provider No name on file     CT angio chest for pulmonary embolism    Result Date: 2/22/2024  Interpreted By:  Ronald Melendez, STUDY: CT ANGIO CHEST FOR PULMONARY EMBOLISM;  2/22/2024 10:43 pm   INDICATION: Signs/Symptoms:Pleuritic right-sided chest pain minimally reproducible to palpation.   COMPARISON: None   ACCESSION NUMBER(S): [id number]   ORDERING CLINICIAN: BENSON BELL   TECHNIQUE: Helical data acquisition of the chest was obtained after intravenous administration of intravenous contrast as per PE protocol. Images were reformatted in coronal and sagittal planes. Axial and coronal maximum intensity projection (MIP) images were created and reviewed.   FINDINGS: Scoliosis. The heart size is enlarged. Coronary calcifications. No pericardial effusion.   Findings suspicious for a tiny nonocclusive pulmonary embolism seen in the right lower lobe subsegmental branches on image 178. No evidence of saddle embolism. No features of right heart strain. Ascending aorta measures 3.1 cm. Patchy left basilar atelectasis.   Small sliding hiatal hernia.   Slight wedging of the superior endplate of T5 which appears chronic.   Imaging of the upper abdomen shows ectatic abdominal aorta with calcifications of the origin of both renal arteries.   No  pneumothorax. No pleural effusions.   Subtle nodular infiltrate seen in the right lower lobe on image 164 and image 159 which could be inflammatory. Imaging of the upper abdomen shows some wall thickening of the splenic flexure suspicious for mild colitis.       1. Findings suspicious for a small subsegmental right lower lobe pulmonary embolism. No evidence of right heart strain. Additionally in the right lower lobe there are few small inflammatory nodules suspicious for mild pneumonitis. 2. Marked scoliosis. Heart size is enlarged. Patchy left basilar atelectasis demineralization of the bones.   3. Not completely evaluated but findings suspicious for a mild splenic flexure colitis.   MACRO: Ronald Melendez discussed the significance and urgency of this critical finding by telephone with  BENSON BELL on 2/22/2024 at 11:52 pm. (**-RCF-**) Findings:  See findings.   Signed by: Ronald Melendez 2/22/2024 11:52 PM Dictation workstation:   [id number]    XR ribs right 2 views    Result Date: 2/22/2024  Interpreted By:  Marielos Rolle, STUDY: XR RIBS RIGHT 2 VIEWS; ;  2/22/2024 8:28 pm   INDICATION: Signs/Symptoms:Possible Rib Fractures.   COMPARISON: None.   ACCESSION NUMBER(S): [id number]   ORDERING CLINICIAN: MYLA HUFFMAN   FINDINGS: Slightly limited examination due to osteopenia. Questionable acute nondisplaced fracture of the right anterior 7th rib. No displaced rib fracture. The imaged lung is clear.       Questionable acute nondisplaced right anterior 7th rib fracture. Please correlate for point tenderness.     MACRO: None   Signed by: Marielos Rolle 2/22/2024 9:09 PM Dictation workstation:   [id number]    XR chest 1 view    Result Date: 2/22/2024  Interpreted By:  Anaid Hammond, STUDY: XR CHEST 1 VIEW;  2/22/2024 7:29 pm   INDICATION: Signs/Symptoms:chest pain   COMPARISON: Chest x-ray 03/15/2021 01/16/2008.   ACCESSION NUMBER(S): [id number]   ORDERING CLINICIAN: BENSON BELL   TECHNIQUE: Portable upright  frontal view of the chest was obtained .   FINDINGS: The cardiomediastinal silhouette is within normal limits. Atherosclerotic calcifications are noted at the thoracic aorta.   There is blunting of the left costophrenic angle suggestive of a small left pleural effusion. There is subsegmental atelectasis at the left midlung and left lung base. No pneumothorax.   There is thoracolumbar scoliosis.       1.  Small left pleural effusion. Subsegmental atelectasis at the left lung.       MACRO: None.   Signed by: Anaid Hammond 2/22/2024 8:13 PM Dictation workstation:   [id number]      Results for orders placed or performed during the hospital encounter of 02/22/24 (from the past 24 hour(s))   CBC   Result Value Ref Range    WBC 9.3 4.4 - 11.3 x10*3/uL    nRBC 0.0 0.0 - 0.0 /100 WBCs    RBC 2.51 (L) 4.00 - 5.20 x10*6/uL    Hemoglobin 7.7 (L) 12.0 - 16.0 g/dL    Hematocrit 25.2 (L) 36.0 - 46.0 %     80 - 100 fL    MCH 30.7 26.0 - 34.0 pg    MCHC 30.6 (L) 32.0 - 36.0 g/dL    RDW 14.0 11.5 - 14.5 %    Platelets 530 (H) 150 - 450 x10*3/uL   Basic metabolic panel   Result Value Ref Range    Glucose 81 74 - 99 mg/dL    Sodium 139 136 - 145 mmol/L    Potassium 4.2 3.5 - 5.3 mmol/L    Chloride 107 98 - 107 mmol/L    Bicarbonate 25 21 - 32 mmol/L    Anion Gap 11 10 - 20 mmol/L    Urea Nitrogen 13 6 - 23 mg/dL    Creatinine 0.57 0.50 - 1.05 mg/dL    eGFR >90 >60 mL/min/1.73m*2    Calcium 7.9 (L) 8.6 - 10.3 mg/dL   Magnesium   Result Value Ref Range    Magnesium 1.92 1.60 - 2.40 mg/dL        Assessment/Plan     #Recurrent C. difficile  #History of ulcerative colitis  #Acute on chronic anemia  #History of latent TB  #History of DVT    Antibiotics  Dificid day 4 out of 10    -Continue Dificid for period of 10 days  -Patient can follow-up with her infectious disease doctor at [facility] who can discuss starting her on INH/B6 and VOWST    I reviewed and interpreted all lab test imaging studies and documentations from  other healthcare providers  I am monitoring for antibiotic side effects and toxicity    Jose Worrell, DO

## 2024-02-27 NOTE — CARE PLAN
The patient's goals for the shift include      The clinical goals for the shift include maintain safety, remain hemodynamically stable      Problem: General Stroke  Goal: Establish a mutual long term goal with patient by discharge  2/27/2024 0143 by Yuli Chamberlain RN  Outcome: Progressing  2/27/2024 0143 by Yuli Chamberlain RN  Outcome: Progressing  Goal: Demonstrate improvement in neurological exam throughout the shift  2/27/2024 0143 by Yuli Chamberlain RN  Outcome: Progressing  2/27/2024 0143 by Yuli Chamberlain RN  Outcome: Progressing  Goal: Maintain BP within ordered limits throughout shift  2/27/2024 0143 by Yuli Chamberlain RN  Outcome: Progressing  2/27/2024 0143 by Yuli Chamberlain RN  Outcome: Progressing  Goal: Participate in treatment (ie., meds, therapy) throughout shift  2/27/2024 0143 by Yuli Chamberlain RN  Outcome: Progressing  2/27/2024 0143 by Yuli Chamberlain RN  Outcome: Progressing  Goal: No symptoms of aspiration throughout shift  2/27/2024 0143 by Yuli Chamberlain RN  Outcome: Progressing  2/27/2024 0143 by Yuli Chamberlain RN  Outcome: Progressing  Goal: No symptoms of hemorrhage throughout shift  2/27/2024 0143 by Yuli Chamberlain RN  Outcome: Progressing  2/27/2024 0143 by Yuli Chamberlain RN  Outcome: Progressing  Goal: Tolerate enteral feeding throughout shift  2/27/2024 0143 by Yuli Chamberlain RN  Outcome: Progressing  2/27/2024 0143 by Yuli Chamberlain RN  Outcome: Progressing  Goal: Decreased nausea/vomiting throughout shift  2/27/2024 0143 by Yuli Chamberlain RN  Outcome: Progressing  2/27/2024 0143 by Yuli Chamberlain RN  Outcome: Progressing  Goal: Controlled blood glucose throughout shift  2/27/2024 0143 by Yuli Chamberlain RN  Outcome: Progressing  2/27/2024 0143 by Yuli Chamberlain RN  Outcome: Progressing  Goal: Out of bed three times today  2/27/2024 0143 by Yuli Chamberlain RN  Outcome: Progressing  2/27/2024 0143 by Yuli Chamberlain RN  Outcome: Progressing     Problem: ICU Stroke  Goal:  Maintain ICP within ordered limits throughout shift  2/27/2024 0143 by Yuli Chamberlain RN  Outcome: Progressing  2/27/2024 0143 by Yuli Chamberlain RN  Outcome: Progressing  Goal: Tolerate EVD clamping trial throughout shift  2/27/2024 0143 by Yuli Chamberlain RN  Outcome: Progressing  2/27/2024 0143 by Yuli Chamberlain RN  Outcome: Progressing  Goal: Tolerate ventilator weaning trial during shift  2/27/2024 0143 by Yuli Chamberlain RN  Outcome: Progressing  2/27/2024 0143 by Yuli Chamberlain RN  Outcome: Progressing  Goal: Maintain patent airway throughout shift  2/27/2024 0143 by Yuli Chamberlain RN  Outcome: Progressing  2/27/2024 0143 by Yuli Chamberlain RN  Outcome: Progressing  Goal: Achieve/maintain targeted sodium level throughout shift  2/27/2024 0143 by Yuli Chamberlain RN  Outcome: Progressing  2/27/2024 0143 by Yuli Chamberlain RN  Outcome: Progressing     Problem: Safety  Goal: Patient will be injury free during hospitalization  2/27/2024 0143 by Yuli Chamberlain RN  Outcome: Progressing  2/27/2024 0143 by Yuli Chamberlain RN  Outcome: Progressing  Goal: I will remain free of falls  2/27/2024 0143 by Yuli Chamberlain RN  Outcome: Progressing  2/27/2024 0143 by Yuli Chamberlain RN  Outcome: Progressing

## 2024-02-27 NOTE — DISCHARGE INSTRUCTIONS
Thank you for choosing [facility] - it has been a pleasure taking part in your medical care. Please follow up with your Primary Medical Physician within 1 week of discharge and any specialists as noted within 1-2 weeks for further workup. If your symptoms should persist or worsen, please return immediately to the nearest Emergency Room for further care. If you have any questions about the care you received please call [facility] at [phone].    Please visit [email or web address] for a coupon for your Dificid. This should help make it more affordable.

## 2024-02-28 ENCOUNTER — PATIENT OUTREACH (OUTPATIENT)
Dept: PRIMARY CARE | Facility: CLINIC | Age: 79
End: 2024-02-28
Payer: MEDICARE

## 2024-02-28 LAB — CALPROTECTIN STL-MCNT: 1260 UG/G

## 2024-02-28 NOTE — PROGRESS NOTES
Discharge Facility:[facility]  Discharge Diagnosis:  Gastrointestinal hemorrhage with melena   Issues Requiring Follow-Up  Recurrent C diff infection  Fecal transplant  Anticoagulation for pulmonary embolism  Reintroduction of aspirin    Admission Date:2/22/2024  Discharge Date: 2/27/2024    PCP Appointment Date:Declined  Specialist Appointment Date: 3/22/2024 Cardio/Fabian, 3/22/2024 OB/GYN [place]  4/15/2024 GI/[facility]il(needs sooner) ID [id number]   Hospital Encounter and Summary: Linked   See discharge assessment below for further details    Engagement  Call Start Time: 1240 (2/28/2024 12:51 PM)    Medications  Medications reviewed with patient/caregiver?: Yes (2/28/2024 12:51 PM)  Is the patient having any side effects they believe may be caused by any medication additions or changes?: (!) Yes (Itching from Eliquis, told to use benadryl but makes drowsy) (2/28/2024 12:51 PM)  Does the patient have all medications ordered at discharge?: Yes (2/28/2024 12:51 PM)  Care Management Interventions: No intervention needed (2/28/2024 12:51 PM)  Prescription Comments: -- (New:Eliquis 5 mg 2 bid x 7 days then one bid, Dificid 200 mg one bid x 6 days, Triamcinalone 0.1%cream apply  qd-bid to affected area. D/C Prednisone and ASA) (2/28/2024 12:51 PM)  Is the patient taking all medications as directed (includes completed medication regime)?: Yes (2/28/2024 12:51 PM)    Appointments  Does the patient have a primary care provider?: Yes (2/28/2024 12:51 PM)  Care Management Interventions: -- (Declined PCP appt will fu with Cardio, GI) (2/28/2024 12:51 PM)  Has the patient kept scheduled appointments due by today?: Yes (2/28/2024 12:51 PM)    Self Management  What is the home health agency?: -- (N/A) (2/28/2024 12:51 PM)  What Durable Medical Equipment (DME) was ordered?: -- (N/A) (2/28/2024 12:51 PM)    Patient Teaching  Does the patient have access to their discharge instructions?: Yes (2/28/2024 12:51 PM)  What is the patient's perception of  their health status since discharge?: Improving (2/28/2024 12:51 PM)  Is the patient/caregiver able to teach back the hierarchy of who to call/visit for symptoms/problems? PCP, Specialist, Home Health nurse, Urgent Care, ED, 911: Yes (2/28/2024 12:51 PM)    Wrap Up  Wrap Up Additional Comments: -- (Luke states she back at [facility] and seems to be doing well. IV was left on discharge, so problem with that but resolved. They will do fu stool studies to make sure all clear.) (2/28/2024 12:51 PM)

## 2024-03-01 DIAGNOSIS — L50.9 URTICARIA: Primary | ICD-10-CM

## 2024-03-01 RX ORDER — DIPHENHYDRAMINE HCL 25 MG
25 CAPSULE ORAL EVERY 6 HOURS PRN
Qty: 30 CAPSULE | Refills: 1 | Status: SHIPPED | OUTPATIENT
Start: 2024-03-01 | End: 2024-04-04 | Stop reason: WASHOUT

## 2024-03-04 LAB
DIAGNOSIS-BB-PR33: NORMAL
PATH REVIEW-TRANSFUSION REACTION: NORMAL
RESIDENT REVIEW-BB: NORMAL
TYPE OF REACTION: NORMAL

## 2024-03-06 DIAGNOSIS — J44.9 CHRONIC OBSTRUCTIVE PULMONARY DISEASE, UNSPECIFIED COPD TYPE (MULTI): ICD-10-CM

## 2024-03-06 DIAGNOSIS — I63.139 CEREBROVASCULAR ACCIDENT (CVA) DUE TO EMBOLISM OF CAROTID ARTERY, UNSPECIFIED BLOOD VESSEL LATERALITY (MULTI): Primary | ICD-10-CM

## 2024-03-12 ENCOUNTER — APPOINTMENT (OUTPATIENT)
Dept: PRIMARY CARE | Facility: CLINIC | Age: 79
End: 2024-03-12
Payer: MEDICARE

## 2024-03-19 ENCOUNTER — TELEPHONE (OUTPATIENT)
Dept: PRIMARY CARE | Facility: CLINIC | Age: 79
End: 2024-03-19
Payer: MEDICARE

## 2024-03-19 DIAGNOSIS — M94.0 COSTOCHONDRITIS, ACUTE: Primary | ICD-10-CM

## 2024-03-19 RX ORDER — METHYLPREDNISOLONE 4 MG/1
TABLET ORAL
Qty: 21 TABLET | Refills: 0 | Status: SHIPPED | OUTPATIENT
Start: 2024-03-19 | End: 2024-03-26

## 2024-03-20 ENCOUNTER — TELEPHONE (OUTPATIENT)
Dept: PRIMARY CARE | Facility: CLINIC | Age: 79
End: 2024-03-20
Payer: MEDICARE

## 2024-03-21 ENCOUNTER — TELEPHONE (OUTPATIENT)
Dept: PRIMARY CARE | Facility: CLINIC | Age: 79
End: 2024-03-21
Payer: MEDICARE

## 2024-03-22 ENCOUNTER — APPOINTMENT (OUTPATIENT)
Dept: OBSTETRICS AND GYNECOLOGY | Facility: CLINIC | Age: 79
End: 2024-03-22
Payer: MEDICARE

## 2024-03-28 ENCOUNTER — HOME VISIT (OUTPATIENT)
Dept: POST ACUTE CARE | Facility: EXTERNAL LOCATION | Age: 79
End: 2024-03-28
Payer: MEDICARE

## 2024-03-28 DIAGNOSIS — J44.9 CHRONIC OBSTRUCTIVE PULMONARY DISEASE, UNSPECIFIED COPD TYPE (MULTI): ICD-10-CM

## 2024-03-28 DIAGNOSIS — I82.462 ACUTE DEEP VEIN THROMBOSIS (DVT) OF CALF MUSCLE VEIN OF LEFT LOWER EXTREMITY (MULTI): Primary | ICD-10-CM

## 2024-03-28 DIAGNOSIS — B37.81 ESOPHAGEAL CANDIDIASIS (MULTI): ICD-10-CM

## 2024-03-28 DIAGNOSIS — I63.139 CEREBROVASCULAR ACCIDENT (CVA) DUE TO EMBOLISM OF CAROTID ARTERY, UNSPECIFIED BLOOD VESSEL LATERALITY (MULTI): ICD-10-CM

## 2024-03-28 PROCEDURE — 99349 HOME/RES VST EST MOD MDM 40: CPT | Performed by: EMERGENCY MEDICINE

## 2024-03-31 NOTE — PROGRESS NOTES
Janice Ko   78 y.o.  female  @location@            Assessment and Plan:  Patient is a long-term resident of [facility] living    77-year-old    Accidental fall  Communicated fracture of distal radius  Ulnar styloid fracture  Left hip intertrochanteric fracture  S/p CVA with baseline weakness  Hypertension  Ulcerative colitis      s/p L hip Cephallomedullary nail for intertrochanteric femur fracture  Pain control  Patient was transfused for low hemoglobin-  H&H is stable since then  Blood pressure is low-hold antihypertensives  Wrist has been splinted  Continue baseline meds  GI due to prophylaxis  OT PT eval after likely surgery    ascorbic acid 500 mg oral tablet 500 mg = 1 tabs, ORAL, DAILY  aspirin 81 mg oral delayed release tablet 81 mg = 1 tabs, ORAL, DAILY  CoQ10 100 mg oral capsule 200 mg = 2 caps, ORAL, DAILY  Crestor 20 mg oral tablet 20 mg = 1 tabs, ORAL, DAILY  ferrous sulfate 325 mg (65 mg elemental iron) oral tablet 325 mg = 1 tabs, ORAL, EVERY OTHER DAY  lactobacillus acidophilus = Floranex, Florajen, Lactinex Granules 1 packets, ORAL, DAILY  losartan 50 mg oral tablet 50 mg = 1 tabs, ORAL, BID  multivitamin 1 tabs, ORAL, DAILY  Norvasc 10 mg oral tablet 5 mg = 0.5 tabs, ORAL, DAILY  Pentasa 500 mg oral capsule, extended release 1,000 mg = 2 caps, ORAL, TID  Ultram 50 mg oral tablet 100 mg = 2 tabs, PRN, ORAL, [id number]  Vitamin D3 25 mcg (1000 intl units) oral capsule 25 mcg = 1 caps, ORAL, DAILY    1. medications are reviewed      2. Continue with rehabilitative, supportive, and or restorative care as ordered and as the patient tolerates     3. Laboratory evaluations will be monitored on an ongoing as needed basis     4. Medications have been cross-referenced with the patient's diagnoses list, and medications reductions have been considered and/or implemented.     5. Pharmacy recommendations are addressed on an ongoing as needed basis.     6. Controlled substances have been electronically  scripted every 60 days for opiates and others of similar schedule, and every 6 months for sedative/hypnotics and others of similar schedule.     7. Nursing has been queried about any potential adverse events that need to be reported to me.    Salient information and adjustment of care plan pertaining to this individual patient interaction today are the following:      A. We will continue with restorative and supportive care as the patient tolerates    B. Laboratory examinations will continue to be drawn on an ongoing as-needed basis. The patient's weight needs to be monitored and if needed we may need to institute appetite stimulating medication    C. The patient's long term prognosis is guarded    Charting is done using voice recognition software and may contain errors which may not have been completely corrected            Source of history: Nurse, Medical personnel, Medical record, Patient.  History limitation: None.    HPI:    Patient is unable to give detailed history and therefore history is obtained from the chart    History from prior hospitalization-    77-year-old who resides at a assisted living facility with past history of hypertension, s/p CVA, ulcerative colitis had accidental fall at the facility patient complained of left wrist pain and left hip pain  Denied any loss of consciousness or neck pain  She is baseline left-sided weakness secondary to CVA  In the ER she was found to have left hip fracture, left distal radial fracture and ulnar styloid process fracture was admitted for further care      Histories   Past Medical History: Past Medical HistoryNo qualifying data available.      Family History: Son: Ulcerative colitis..      Procedure history: Colonoscopy.: 2018  Cataract: 2014  History of Biopsy Breast Open  x 2      Social History        Social & Psychosocial History  Social History  Alcohol    Current, Beer, Wine, 3-5 times per week    Exercise  Regular exercise    Other      Comment:  G4 SVDx4 (09/23/2020 11:34 - Susanna Diane)    Sexual  No Sexual Activity    Substance Abuse  Denies Substance Abuse    Tobacco  Denies Tobacco Use  Never (less than 100 in lifetime) Tobacco Use:.    Psychosocial History   No active psychosocial history has been recorded  .        Health Status      Allergies (4) Active Reaction  EPINEPHrine None Documented  erythromycin None Documented  sulfa drugs None Documented  tetanus toxoid temperature, swelling    Active Problems (46)  Acute deep venous thrombosis of calf..   Anemia   Anemia   Anxiety   Anxiety   Arthritis   Arthritis   At risk for falls   Atypical chest pain   Benign essential hypertension   Blood Products Refused   Blood Products Refused   Cerebrovascular accident   COVID-19   Cystocele   Decreased body mass index   Delay when starting to pass urine   Difficulty walking   Electrocardiogram abnormal   Finding of functional performance and activity   Gastroesophageal reflux disease   Hemiparesis as late effect of cerebrovascular accident   History of anemia   History of chest pain   Hyperlipidemia   Hypertension   Hypertensive disorder   Increased frequency of urination   Iron deficiency anemia   Left lower quadrant pain   Leukocytosis   Loss of hair   Mitral valve regurgitation   Palpitations   Paroxysmal supraventricular tachycardia   Patient encounter status   Seasonal allergic rhinitis   Tick bite   Ulcerative colitis   Ulcerative colitis   Urinary hesitancy   Uterine prolapse   Uterine prolapse   Vaginal bleeding.   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 28 tablet 0    apixaban (Eliquis) 5 mg tablet Take 1 tablet (5 mg) by mouth 2 times a day. Do not start before March 4, 2024. 60 tablet 0    calcium carbonate-vitamin D3 (Oyster Shell) 250 mg-3.125 mcg (125  unit) tablet Take by mouth 2 times a day with meals.      [facility] 10 billion cell -200 mg capsule, sprinkle       diphenhydrAMINE (BENADryl) 25 mg capsule Take 1 capsule (25 mg) by mouth every 6 hours if needed for itching for up to 10 days. 30 capsule 1    ferrous sulfate, 325 mg ferrous sulfate, (FerrouSuL) tablet Take 1 tablet by mouth once daily with breakfast. 90 tablet 3    losartan (Cozaar) 50 mg tablet Take by mouth.      multivitamin tablet Take 1 tablet by mouth once daily.      pantoprazole (ProtoNix) 40 mg EC tablet       Pentasa 500 mg ER capsule Take 1 capsule (500 mg) by mouth 3 times a day. 3 pills in the morning  2 pills at 1200   3 at 6 pm    Per Dr. Talavera      triamcinolone (Kenalog) 0.1 % cream Apply to affected area 1-2 times daily as needed. 15 g 0     No current facility-administered medications for this visit.       Physical Exam:  Vital signs as per nursing/MA documentation were reviewed  General appearance: Alert and in no acute distress  HEENT: Normal Inspection  Neck - Normal Inspection  Respiratory : No respiratory distress. Lungs are clear   Cardiovascular: heart rate normal. No gallop  Back - normal inspection  Skin inspection:Warm  Musculoskeletal : No deformities  Neuro : Limited exam. Baseline    ROS: Review of symptoms is negative except for what is mentioned in HPI    Results/Data  Records including but not limited to electronic medical records, relevant lab work and imaging from patient's health care facility encounter were reviewed and independently verified      Charting was completed using voice recognition technology and may include unintended errors.    Discussed with patient/family, RN, and NP.

## 2024-04-04 ENCOUNTER — OFFICE VISIT (OUTPATIENT)
Dept: PRIMARY CARE | Facility: CLINIC | Age: 79
End: 2024-04-04
Payer: MEDICARE

## 2024-04-04 VITALS
OXYGEN SATURATION: 97 % | HEART RATE: 92 BPM | HEIGHT: 60 IN | BODY MASS INDEX: 17.91 KG/M2 | SYSTOLIC BLOOD PRESSURE: 148 MMHG | TEMPERATURE: 98.3 F | WEIGHT: 91.2 LBS | DIASTOLIC BLOOD PRESSURE: 70 MMHG

## 2024-04-04 DIAGNOSIS — D64.9 ANEMIA, UNSPECIFIED TYPE: ICD-10-CM

## 2024-04-04 DIAGNOSIS — I10 BENIGN ESSENTIAL HYPERTENSION: ICD-10-CM

## 2024-04-04 DIAGNOSIS — Z22.7 TB LUNG, LATENT: ICD-10-CM

## 2024-04-04 DIAGNOSIS — M41.9 SCOLIOSIS OF THORACOLUMBAR SPINE, UNSPECIFIED SCOLIOSIS TYPE: ICD-10-CM

## 2024-04-04 DIAGNOSIS — I26.93 SINGLE SUBSEGMENTAL PULMONARY EMBOLISM WITHOUT ACUTE COR PULMONALE (MULTI): Primary | ICD-10-CM

## 2024-04-04 DIAGNOSIS — G81.94 LEFT HEMIPARESIS (MULTI): ICD-10-CM

## 2024-04-04 DIAGNOSIS — K51.019 ULCERATIVE PANCOLITIS WITH COMPLICATION (MULTI): ICD-10-CM

## 2024-04-04 DIAGNOSIS — J44.9 CHRONIC OBSTRUCTIVE PULMONARY DISEASE, UNSPECIFIED COPD TYPE (MULTI): ICD-10-CM

## 2024-04-04 PROCEDURE — 1036F TOBACCO NON-USER: CPT | Performed by: FAMILY MEDICINE

## 2024-04-04 PROCEDURE — 99214 OFFICE O/P EST MOD 30 MIN: CPT | Performed by: FAMILY MEDICINE

## 2024-04-04 PROCEDURE — 1159F MED LIST DOCD IN RCRD: CPT | Performed by: FAMILY MEDICINE

## 2024-04-04 PROCEDURE — 1125F AMNT PAIN NOTED PAIN PRSNT: CPT | Performed by: FAMILY MEDICINE

## 2024-04-04 PROCEDURE — 1160F RVW MEDS BY RX/DR IN RCRD: CPT | Performed by: FAMILY MEDICINE

## 2024-04-04 PROCEDURE — 3077F SYST BP >= 140 MM HG: CPT | Performed by: FAMILY MEDICINE

## 2024-04-04 PROCEDURE — 3078F DIAST BP <80 MM HG: CPT | Performed by: FAMILY MEDICINE

## 2024-04-04 RX ORDER — CHOLECALCIFEROL (VITAMIN D3) 25 MCG
TABLET ORAL
COMMUNITY
Start: 2024-03-29

## 2024-04-04 RX ORDER — MULTIVIT-MIN/IRON FUM/FOLIC AC 7.5 MG-4
1 TABLET ORAL
COMMUNITY

## 2024-04-04 RX ORDER — APIXABAN 2.5 MG/1
TABLET, FILM COATED ORAL
COMMUNITY
Start: 2024-03-30 | End: 2024-06-03 | Stop reason: SDUPTHER

## 2024-04-04 RX ORDER — MULTIVIT-MIN/IRON FUM/FOLIC AC 7.5 MG-4
TABLET ORAL
COMMUNITY

## 2024-04-04 RX ORDER — CALCIUM CARBONATE/VITAMIN D3 600MG-5MCG
TABLET ORAL EVERY 8 HOURS
COMMUNITY

## 2024-04-04 RX ORDER — L.ACID,FERM,PLA,RHA/B.BIF,LONG 126 MG
TABLET, DELAYED AND EXTENDED RELEASE ORAL
COMMUNITY
Start: 2023-09-11

## 2024-04-04 RX ORDER — HYDROCODONE BITARTRATE AND ACETAMINOPHEN 5; 325 MG/1; MG/1
TABLET ORAL
COMMUNITY
Start: 2023-08-22 | End: 2024-04-04 | Stop reason: WASHOUT

## 2024-04-04 RX ORDER — HYDRALAZINE HYDROCHLORIDE 20 MG/ML
5 INJECTION INTRAMUSCULAR; INTRAVENOUS EVERY 8 HOURS PRN
COMMUNITY
Start: 2024-02-24 | End: 2024-04-04 | Stop reason: ALTCHOICE

## 2024-04-04 RX ORDER — LANOLIN ALCOHOL/MO/W.PET/CERES
CREAM (GRAM) TOPICAL
COMMUNITY
Start: 2024-02-27

## 2024-04-04 RX ORDER — ISONIAZID 300 MG/1
TABLET ORAL
COMMUNITY
Start: 2024-02-27 | End: 2024-04-04 | Stop reason: WASHOUT

## 2024-04-04 RX ORDER — ACETAMINOPHEN 325 MG/1
TABLET ORAL
COMMUNITY
Start: 2024-03-12

## 2024-04-04 RX ORDER — ASPIRIN 325 MG
TABLET, DELAYED RELEASE (ENTERIC COATED) ORAL
COMMUNITY
Start: 2024-03-29

## 2024-04-04 RX ORDER — LIDOCAINE 50 MG/G
1 PATCH TOPICAL
COMMUNITY
Start: 2024-01-24

## 2024-04-04 RX ORDER — FLUCONAZOLE 100 MG/1
TABLET ORAL
COMMUNITY
Start: 2024-01-16 | End: 2024-04-04 | Stop reason: ALTCHOICE

## 2024-04-04 ASSESSMENT — ENCOUNTER SYMPTOMS
CONSTITUTIONAL NEGATIVE: 1
OCCASIONAL FEELINGS OF UNSTEADINESS: 0
CARDIOVASCULAR NEGATIVE: 1
LOSS OF SENSATION IN FEET: 0
NEUROLOGICAL NEGATIVE: 1
DEPRESSION: 0
GASTROINTESTINAL NEGATIVE: 1
RESPIRATORY NEGATIVE: 1

## 2024-04-04 ASSESSMENT — PAIN SCALES - GENERAL: PAINLEVEL: 7

## 2024-04-04 NOTE — PROGRESS NOTES
Subjective   Patient ID: Janice Ko is a 78 y.o. female who presents for misc (Discuss meds ).    HPI     Review of Systems   Constitutional: Negative.    Respiratory: Negative.          Right lower lobe small PE     Cardiovascular: Negative.         DR Peralta cardiology doing carotid US 4/15   Gastrointestinal: Negative.    Neurological: Negative.        Objective   /70 (BP Location: Left arm)   Pulse 92   Temp 36.8 °C (98.3 °F) (Temporal)   Ht 1.524 m (5')   Wt (!) 41.4 kg (91 lb 3.2 oz)   SpO2 97%   BMI 17.81 kg/m²     Physical Exam  Vitals and nursing note reviewed. Exam conducted with a chaperone present.   Constitutional:       Appearance: Normal appearance.   Cardiovascular:      Rate and Rhythm: Normal rate and regular rhythm.      Heart sounds:      Gallop present. S4 sounds present.   Pulmonary:      Breath sounds: Normal breath sounds.   Musculoskeletal:      Comments: Severe costochondritis   Neurological:      General: No focal deficit present.      Mental Status: She is alert and oriented to person, place, and time.   Psychiatric:         Mood and Affect: Mood normal.         Behavior: Behavior normal.         Assessment/Plan patient seen here to follow-up after having been in the emergency room suffering a pulmonary embolus she is on Eliquis we referred her medications pre and post admission.  She is presently at [address] [facility] we will see her back as needed  Problem List Items Addressed This Visit             ICD-10-CM    Anemia D64.9    Benign essential hypertension I10    Chronic obstructive pulmonary disease (CMS/HCC) J44.9    Ulcerative colitis (CMS/HCC) K51.90    Left hemiparesis (CMS/HCC) G81.94    TB lung, latent Z22.7     Other Visit Diagnoses         Codes    Single subsegmental pulmonary embolism without acute cor pulmonale (CMS/HCC)    -  Primary I26.93    Scoliosis of thoracolumbar spine, unspecified scoliosis type     M41.9

## 2024-04-10 PROBLEM — I36.1 TRICUSPID VALVE INSUFFICIENCY, NON-RHEUMATIC: Status: ACTIVE | Noted: 2024-03-22
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
## 2024-04-10 PROBLEM — R07.2 PRECORDIAL PAIN: Status: ACTIVE | Noted: 2024-03-22

## 2024-04-10 PROBLEM — S22.31XA CLOSED FRACTURE OF ONE RIB OF RIGHT SIDE: Status: ACTIVE | Noted: 2024-04-10

## 2024-04-10 PROBLEM — I26.99 PULMONARY THROMBOEMBOLISM (MULTI): Status: ACTIVE | Noted: 2024-04-10

## 2024-04-12 ENCOUNTER — PROCEDURE VISIT (OUTPATIENT)
Dept: OBSTETRICS AND GYNECOLOGY | Facility: CLINIC | Age: 79
End: 2024-04-12
Payer: MEDICARE

## 2024-04-12 VITALS
HEIGHT: 60 IN | BODY MASS INDEX: 17.55 KG/M2 | DIASTOLIC BLOOD PRESSURE: 75 MMHG | SYSTOLIC BLOOD PRESSURE: 168 MMHG | WEIGHT: 89.4 LBS

## 2024-04-12 DIAGNOSIS — N81.6 CYSTOCELE WITH RECTOCELE: Primary | ICD-10-CM

## 2024-04-12 DIAGNOSIS — N81.10 CYSTOCELE WITH RECTOCELE: Primary | ICD-10-CM

## 2024-04-12 PROCEDURE — 99213 OFFICE O/P EST LOW 20 MIN: CPT | Performed by: OBSTETRICS & GYNECOLOGY

## 2024-04-12 NOTE — PROGRESS NOTES
Subjective   Patient ID: Janice Ko is a 78 y.o. female who presents for Pessary Check (Patient here for pessary check/Pt states urinary and bowels are good /Adult daughter in room as chaperone).  HPI  Patient presents for pessary check.    Known history of cystorectocele with ring with wet pessary in place.  Patient states occasional vaginal discharge no vaginal bleeding or pelvic pain.  Denies blood in urine or stool.  Review of Systems    Objective   Physical Exam  Pelvic: External genitalia atrophic, vagina pessary was removed and cleaned.  Speculum placed in the vagina no vaginal lesions noted.  Pessary replaced.  Assessment/Plan   Normal pessary check, patient will follow-up in 3 months or as needed.         Marcelo Correa MD 04/12/24 10:04 AM

## 2024-04-23 ENCOUNTER — APPOINTMENT (OUTPATIENT)
Dept: PRIMARY CARE | Facility: CLINIC | Age: 79
End: 2024-04-23
Payer: MEDICARE

## 2024-04-23 DIAGNOSIS — I10 BENIGN ESSENTIAL HYPERTENSION: Primary | ICD-10-CM

## 2024-04-23 DIAGNOSIS — I63.139 CEREBROVASCULAR ACCIDENT (CVA) DUE TO EMBOLISM OF CAROTID ARTERY, UNSPECIFIED BLOOD VESSEL LATERALITY (MULTI): ICD-10-CM

## 2024-04-23 DIAGNOSIS — I82.462 ACUTE DEEP VEIN THROMBOSIS (DVT) OF CALF MUSCLE VEIN OF LEFT LOWER EXTREMITY (MULTI): ICD-10-CM

## 2024-04-23 RX ORDER — LOSARTAN POTASSIUM 50 MG/1
50 TABLET ORAL DAILY
Qty: 60 TABLET | Refills: 0 | Status: SHIPPED | OUTPATIENT
Start: 2024-04-23

## 2024-04-25 ENCOUNTER — HOME VISIT (OUTPATIENT)
Dept: POST ACUTE CARE | Facility: EXTERNAL LOCATION | Age: 79
End: 2024-04-25
Payer: MEDICARE

## 2024-04-25 DIAGNOSIS — I10 BENIGN ESSENTIAL HYPERTENSION: Primary | ICD-10-CM

## 2024-04-25 DIAGNOSIS — I63.139 CEREBROVASCULAR ACCIDENT (CVA) DUE TO EMBOLISM OF CAROTID ARTERY, UNSPECIFIED BLOOD VESSEL LATERALITY (MULTI): ICD-10-CM

## 2024-04-25 DIAGNOSIS — K51.019 ULCERATIVE PANCOLITIS WITH COMPLICATION (MULTI): ICD-10-CM

## 2024-04-25 DIAGNOSIS — A04.72 C. DIFFICILE COLITIS: ICD-10-CM

## 2024-04-25 PROCEDURE — 99348 HOME/RES VST EST LOW MDM 30: CPT | Performed by: EMERGENCY MEDICINE

## 2024-04-28 NOTE — PROGRESS NOTES
Janice Ko   78 y.o.  female  @location@            Assessment and Plan:  Patient is a long-term resident of [facility] assisted living    77-year-old    Accidental fall  Communicated fracture of distal radius  Ulnar styloid fracture  Left hip intertrochanteric fracture  S/p CVA with baseline weakness  Hypertension  Ulcerative colitis      s/p L hip Cephallomedullary nail for intertrochanteric femur fracture  Pain control  Patient was transfused for low hemoglobin-  H&H is stable since then  Blood pressure is low-hold antihypertensives  Wrist has been splinted  Continue baseline meds  GI due to prophylaxis  OT PT eval after likely surgery    ascorbic acid 500 mg oral tablet 500 mg = 1 tabs, ORAL, DAILY  aspirin 81 mg oral delayed release tablet 81 mg = 1 tabs, ORAL, DAILY  CoQ10 100 mg oral capsule 200 mg = 2 caps, ORAL, DAILY  Crestor 20 mg oral tablet 20 mg = 1 tabs, ORAL, DAILY  ferrous sulfate 325 mg (65 mg elemental iron) oral tablet 325 mg = 1 tabs, ORAL, EVERY OTHER DAY  lactobacillus acidophilus = Floranex, Florajen, Lactinex Granules 1 packets, ORAL, DAILY  losartan 50 mg oral tablet 50 mg = 1 tabs, ORAL, BID  multivitamin 1 tabs, ORAL, DAILY  Norvasc 10 mg oral tablet 5 mg = 0.5 tabs, ORAL, DAILY  Pentasa 500 mg oral capsule, extended release 1,000 mg = 2 caps, ORAL, TID  Ultram 50 mg oral tablet 100 mg = 2 tabs, PRN, ORAL, [id number]  Vitamin D3 25 mcg (1000 intl units) oral capsule 25 mcg = 1 caps, ORAL, DAILY    This patient was seen for my regular monthly visit, nursing evaluations and nursing notes were reviewed, interim events are reviewed, interim concerns and messages were reviewed as we have communicated with nursing staff.  Any issues with the falls, skin care impairment, declining physical condition are reviewed and noted, diagnosis list were reviewed, list of medications were reviewed, living will related issues were reviewed, overall patient has been doing well, any declining in patient's  condition or any change in patient's condition needs to be notified to physician promptly, discussed with nursing staff, if needed would communicate with family.  Patient stays confined here at the facility for long-term care, there are always concerns about long-term care related issues and concerns.  Nursing staff is trying their best to keep patient safe, all sort of measures has been taken to keep patient safe and comfortable.       Source of history: Nurse, Medical personnel, Medical record, Patient.  History limitation: None.    HPI:    Patient is unable to give detailed history and therefore history is obtained from the chart    History from prior hospitalization-    77-year-old who resides at a assisted living facility with past history of hypertension, s/p CVA, ulcerative colitis had accidental fall at the facility patient complained of left wrist pain and left hip pain  Denied any loss of consciousness or neck pain  She is baseline left-sided weakness secondary to CVA  In the ER she was found to have left hip fracture, left distal radial fracture and ulnar styloid process fracture was admitted for further care      Histories   Past Medical History: Past Medical HistoryNo qualifying data available.      Family History: Son: Ulcerative colitis..      Procedure history: Colonoscopy.: 2018  Cataract: 2014  History of Biopsy Breast Open  x 2      Social History        Social & Psychosocial History  Social History  Alcohol    Current, Beer, Wine, 3-5 times per week    Exercise  Regular exercise    Other      Comment: G4 SVDx4 (09/23/2020 11:34 - Diane Mullins)    Sexual  No Sexual Activity    Substance Abuse  Denies Substance Abuse    Tobacco  Denies Tobacco Use  Never (less than 100 in lifetime) Tobacco Use:.    Psychosocial History   No active psychosocial history has been recorded  .        Health Status      Allergies (4) Active Reaction  EPINEPHrine None Documented  erythromycin None  Documented  sulfa drugs None Documented  tetanus toxoid temperature, swelling    Active Problems (46)  Acute deep venous thrombosis of calf..   Anemia   Anemia   Anxiety   Anxiety   Arthritis   Arthritis   At risk for falls   Atypical chest pain   Benign essential hypertension   Blood Products Refused   Blood Products Refused   Cerebrovascular accident   COVID-19   Cystocele   Decreased body mass index   Delay when starting to pass urine   Difficulty walking   Electrocardiogram abnormal   Finding of functional performance and activity   Gastroesophageal reflux disease   Hemiparesis as late effect of cerebrovascular accident   History of anemia   History of chest pain   Hyperlipidemia   Hypertension   Hypertensive disorder   Increased frequency of urination   Iron deficiency anemia   Left lower quadrant pain   Leukocytosis   Loss of hair   Mitral valve regurgitation   Palpitations   Paroxysmal supraventricular tachycardia   Patient encounter status   Seasonal allergic rhinitis   Tick bite   Ulcerative colitis   Ulcerative colitis   Urinary hesitancy   Uterine prolapse   Uterine prolapse   Vaginal bleeding.   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      calcium carbonate-vitamin D3 (Oyster Shell) 250 mg-3.125 mcg (125 unit) tablet Take by mouth 2 times a day with meals.      calcium carbonate-vitamin D3 600 mg-5 mcg (200 unit) tablet Take by mouth every 8 hours.      cholecalciferol (Vitamin D-3) 25 MCG (1000 UT) tablet       co-enzyme Q-10 50 mg capsule       [facility] 10 billion cell -200 mg capsule, sprinkle       Eliquis 2.5 mg tablet       ferrous sulfate, 325 mg ferrous sulfate, (FerrouSuL) tablet Take 1 tablet by mouth once daily with breakfast. 90 tablet 3     L.acid,ferm,ricardo,rha-B.bif,long (Controlled Delivery Probiotic) 126 mg (2 billion cell) tablet,delayed and ext.release Take by mouth once daily.      lidocaine (Lidoderm) 5 % patch Place 1 patch on the skin once daily.      losartan (Cozaar) 50 mg tablet Take 1 tablet (50 mg) by mouth once daily. 60 tablet 0    multivitamin tablet Take 1 tablet by mouth once daily.      multivitamin with minerals (multivit-min-iron fum-folic ac) tablet Take by mouth.      multivitamin with minerals (multivit-min-iron fum-folic ac) tablet Take 1 tablet by mouth once daily.      Pentasa 500 mg ER capsule Take 1 capsule (500 mg) by mouth 3 times a day. 3 pills in the morning  2 pills at 1200   3 at 6 pm    Per Dr. Talavera      pyridoxine (Vitamin B-6) 50 mg tablet TAKE 1 BY MOUTH ONCE A DAY FOR 14 DAYS       No current facility-administered medications for this visit.       Physical Exam:  Vital signs as per nursing/MA documentation were reviewed  General appearance: Alert and in no acute distress  HEENT: Normal Inspection  Neck - Normal Inspection  Respiratory : No respiratory distress. Lungs are clear   Cardiovascular: heart rate normal. No gallop  Back - normal inspection  Skin inspection:Warm  Musculoskeletal : No deformities  Neuro : Limited exam. Baseline    ROS: Review of symptoms is negative except for what is mentioned in HPI    Results/Data  Records including but not limited to electronic medical records, relevant lab work and imaging from patient's health care facility encounter were reviewed and independently verified      Charting was completed using voice recognition technology and may include unintended errors.    Discussed with patient/family, RN, and NP.

## 2024-05-06 ENCOUNTER — OFFICE VISIT (OUTPATIENT)
Dept: CARDIOLOGY | Facility: CLINIC | Age: 79
End: 2024-05-06
Payer: MEDICARE

## 2024-05-06 VITALS
BODY MASS INDEX: 18.42 KG/M2 | WEIGHT: 93.8 LBS | DIASTOLIC BLOOD PRESSURE: 80 MMHG | OXYGEN SATURATION: 99 % | HEIGHT: 60 IN | HEART RATE: 83 BPM | SYSTOLIC BLOOD PRESSURE: 158 MMHG

## 2024-05-06 DIAGNOSIS — K51.911 ULCERATIVE COLITIS WITH RECTAL BLEEDING, UNSPECIFIED LOCATION (MULTI): ICD-10-CM

## 2024-05-06 DIAGNOSIS — E78.5 HYPERLIPIDEMIA, UNSPECIFIED HYPERLIPIDEMIA TYPE: ICD-10-CM

## 2024-05-06 DIAGNOSIS — I34.0 NONRHEUMATIC MITRAL VALVE REGURGITATION: ICD-10-CM

## 2024-05-06 DIAGNOSIS — J44.9 CHRONIC OBSTRUCTIVE PULMONARY DISEASE, UNSPECIFIED COPD TYPE (MULTI): ICD-10-CM

## 2024-05-06 DIAGNOSIS — I26.99 PULMONARY THROMBOEMBOLISM (MULTI): Primary | ICD-10-CM

## 2024-05-06 DIAGNOSIS — I63.139 CEREBROVASCULAR ACCIDENT (CVA) DUE TO EMBOLISM OF CAROTID ARTERY, UNSPECIFIED BLOOD VESSEL LATERALITY (MULTI): ICD-10-CM

## 2024-05-06 DIAGNOSIS — Z22.7 TB LUNG, LATENT: ICD-10-CM

## 2024-05-06 DIAGNOSIS — I36.1 TRICUSPID VALVE INSUFFICIENCY, NON-RHEUMATIC: ICD-10-CM

## 2024-05-06 DIAGNOSIS — I10 BENIGN ESSENTIAL HYPERTENSION: ICD-10-CM

## 2024-05-06 PROCEDURE — 1159F MED LIST DOCD IN RCRD: CPT | Performed by: INTERNAL MEDICINE

## 2024-05-06 PROCEDURE — 3079F DIAST BP 80-89 MM HG: CPT | Performed by: INTERNAL MEDICINE

## 2024-05-06 PROCEDURE — 1160F RVW MEDS BY RX/DR IN RCRD: CPT | Performed by: INTERNAL MEDICINE

## 2024-05-06 PROCEDURE — 3077F SYST BP >= 140 MM HG: CPT | Performed by: INTERNAL MEDICINE

## 2024-05-06 PROCEDURE — 1036F TOBACCO NON-USER: CPT | Performed by: INTERNAL MEDICINE

## 2024-05-06 PROCEDURE — 99215 OFFICE O/P EST HI 40 MIN: CPT | Performed by: INTERNAL MEDICINE

## 2024-05-06 PROCEDURE — 93000 ELECTROCARDIOGRAM COMPLETE: CPT | Performed by: INTERNAL MEDICINE

## 2024-05-06 NOTE — PROGRESS NOTES
Subjective  Janice Ko  is a 78 y.o. year old female who presents for rempte DVT. And recent pulmonary empbolism admitted with GI hemorrhage from ulcerative colitis and pulmonary embolism discharged 2/27/24.  To stay on Eliquis untin June 1, 2024    Blood pressure 158/80, pulse 83, height 1.524 m (5'), weight (!) 42.5 kg (93 lb 12.8 oz), SpO2 99%.   Epinephrine, Erythromycin, Rifampin, Sulfa (sulfonamide antibiotics), and Tetanus immune globulin  Past Medical History:   Diagnosis Date    Personal history of other diseases of the circulatory system     History of mitral valve prolapse    Personal history of other specified conditions     History of chest pain    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     Varicose veins of lower extremities with inflammation     Past Surgical History:   Procedure Laterality Date    BREAST BIOPSY  07/09/2018    Biopsy Breast Open    CATARACT EXTRACTION  07/09/2018    Cataract Surgery    MR HEAD ANGIO WO IV CONTRAST  12/27/2022    MR HEAD ANGIO WO IV CONTRAST 12/27/2022 DOCTOR OFFICE [facility]    MR NECK ANGIO WO IV CONTRAST  12/27/2022    MR NECK ANGIO WO IV CONTRAST 12/27/2022 DOCTOR OFFICE LEGAC[facility]     Family History   Problem Relation Name Age of Onset    Heart failure Mother      Hypertension Mother      Other (malignant neoplasm of breast) Mother      Other (cva) Father      Hypertension Father      Other (myocardial infarction) Father      Other (stroke-in-evolution syndrome) Father      Multiple sclerosis Sister      Hypertension Brother      Alzheimer's disease Mother's Sister       @SOC    Current Outpatient Medications   Medication Sig Dispense Refill    acetaminophen (Tylenol) 325 mg tablet       benzocaine-menthol (Cepastat Sore Throat) 15-3.6 mg lozenge Dissolve 1 lozenge in the mouth.      calcium carbonate-vitamin D3 (Oyster Shell) 250 mg-3.125 mcg (125 unit) tablet Take by mouth 2 times a day with meals.      calcium carbonate-vitamin D3 600  mg-5 mcg (200 unit) tablet Take by mouth every 8 hours.      cholecalciferol (Vitamin D-3) 25 MCG (1000 UT) tablet       co-enzyme Q-10 50 mg capsule       [facility] 10 billion cell -200 mg capsule, sprinkle       Eliquis 2.5 mg tablet       ferrous sulfate, 325 mg ferrous sulfate, (FerrouSuL) tablet Take 1 tablet by mouth once daily with breakfast. 90 tablet 3    L.acid,ferm,ricardo,rha-B.bif,long (Controlled Delivery Probiotic) 126 mg (2 billion cell) tablet,delayed and ext.release Take by mouth once daily.      lidocaine (Lidoderm) 5 % patch Place 1 patch on the skin once daily.      losartan (Cozaar) 50 mg tablet Take 1 tablet (50 mg) by mouth once daily. 60 tablet 0    multivitamin tablet Take 1 tablet by mouth once daily.      multivitamin with minerals (multivit-min-iron fum-folic ac) tablet Take by mouth.      multivitamin with minerals (multivit-min-iron fum-folic ac) tablet Take 1 tablet by mouth once daily.      Pentasa 500 mg ER capsule Take 1 capsule (500 mg) by mouth 3 times a day. 3 pills in the morning  2 pills at 1200   3 at 6 pm    Per Dr. Talavera      pyridoxine (Vitamin B-6) 50 mg tablet TAKE 1 BY MOUTH ONCE A DAY FOR 14 DAYS       No current facility-administered medications for this visit.        ROS  Review of Systems   All other systems reviewed and are negative.      Physical Exam  Physical Exam  Constitutional:       Appearance: Normal appearance.   HENT:      Head: Normocephalic and atraumatic.   Cardiovascular:      Heart sounds: S1 normal and S2 normal.   Pulmonary:      Effort: Pulmonary effort is normal.      Breath sounds: Normal breath sounds.   Abdominal:      General: Abdomen is flat.   Musculoskeletal:      Right lower leg: No edema.      Left lower leg: No edema.   Skin:     General: Skin is warm and dry.   Neurological:      General: No focal deficit present.      Mental Status: She is alert and oriented to person, place, and time.   Psychiatric:         Mood and  Affect: Mood normal.         Behavior: Behavior normal.          EKG  Encounter Date: 05/06/24   ECG 12 Lead    Narrative    NSR at 75/min., borderline LVH       Problem List Items Addressed This Visit       Benign essential hypertension    Relevant Orders    ECG 12 Lead (Completed)    Cerebrovascular accident (Multi)    Chronic obstructive pulmonary disease (Multi)    Ulcerative colitis (Multi)    Hyperlipidemia     Lipid profile         Relevant Orders    Follow Up In Cardiology    Mitral regurgitation     Moderate to severe CCF adission early 2024         Relevant Orders    Transthoracic Echo (TTE) Complete    TB lung, latent    Pulmonary thromboembolism (Multi) - Primary    Tricuspid valve insufficiency, non-rheumatic         Return 3 months with EKG and echocardiogram      Javier Perez MD

## 2024-05-09 NOTE — TELEPHONE ENCOUNTER
Pts infectious disease  Mentioned if she could take fosamax due to scan/osteo,breathing.  She was just in the office recently.   Chely-daughters [phone]  ty   [FreeTextEntry1] : ---Assessment plan----------The patient has been referred here for further opinion regarding pulmonary problem, 71 year old male with history of hypertension, hyperlipidemia, CAD post cardiac stents, former 30 pack year smoker who quit in 1989, with IPF ( positive Envisia testing by bronchoscopy) now on Ofev since May, initially tolerated well. NOW  Ofev held DUE TO GI SYMPTOMS--- .  1. ILD -----UIP PATTERN--NOT TOLERATING high   OFEV---   CONDT LOW DOSE OFEV 100MG PO BID--ALSO ON LOW DOSE PREDNISSONE 10 MG PO QD----patient ON  inhaled REMODULIN TRIAL [TETON TRIAL ]  trial for patients with UIP ---INCREASE TO 4 BREATHS 4 TIMES DAILY---. Spirometry,---------- 2. HOME EXECRCISES  3. follow up in 1 month   .Thanks for allowing  me to participate  in the care of this patient.  Patient at this time  will follow  the above mentioned recommendations and return back for follow up visit. If you have any questions  I can be reached  at # [phone] (office). Casa Bañuelos MD, [facility]  Pulmonary, Critical Care and Sleep Medicine

## 2024-05-23 ENCOUNTER — TELEPHONE (OUTPATIENT)
Dept: PRIMARY CARE | Facility: CLINIC | Age: 79
End: 2024-05-23
Payer: MEDICARE

## 2024-05-23 NOTE — TELEPHONE ENCOUNTER
Pt called and states she needs to speak with you. Pt states that she was told to stop taking the eliquis on June 1st but does not have no written order from [facility]. Pt would like for you to give her a call. Pt states to call her daughter. Found the note that does say it. Pt states that someone is saying it is to dangerous to be on the eliquis due to her platelets.

## 2024-05-26 ENCOUNTER — APPOINTMENT (OUTPATIENT)
Dept: CARDIOLOGY | Facility: HOSPITAL | Age: 79
End: 2024-05-26
Payer: MEDICARE

## 2024-05-26 ENCOUNTER — HOSPITAL ENCOUNTER (EMERGENCY)
Facility: HOSPITAL | Age: 79
Discharge: HOME | End: 2024-05-26
Attending: STUDENT IN AN ORGANIZED HEALTH CARE EDUCATION/TRAINING PROGRAM
Payer: MEDICARE

## 2024-05-26 VITALS
HEART RATE: 80 BPM | WEIGHT: 93 LBS | RESPIRATION RATE: 16 BRPM | BODY MASS INDEX: 18.26 KG/M2 | OXYGEN SATURATION: 98 % | SYSTOLIC BLOOD PRESSURE: 145 MMHG | TEMPERATURE: 97.9 F | HEIGHT: 60 IN | DIASTOLIC BLOOD PRESSURE: 78 MMHG

## 2024-05-26 DIAGNOSIS — R42 LIGHTHEADEDNESS: Primary | ICD-10-CM

## 2024-05-26 LAB
ANION GAP SERPL CALC-SCNC: 10 MMOL/L (ref 10–20)
BASOPHILS # BLD AUTO: 0.1 X10*3/UL (ref 0–0.1)
BASOPHILS NFR BLD AUTO: 1.2 %
BNP SERPL-MCNC: 82 PG/ML (ref 0–99)
BUN SERPL-MCNC: 26 MG/DL (ref 6–23)
CALCIUM SERPL-MCNC: 9 MG/DL (ref 8.6–10.3)
CARDIAC TROPONIN I PNL SERPL HS: 6 NG/L (ref 0–13)
CHLORIDE SERPL-SCNC: 104 MMOL/L (ref 98–107)
CO2 SERPL-SCNC: 28 MMOL/L (ref 21–32)
CREAT SERPL-MCNC: 0.75 MG/DL (ref 0.5–1.05)
EGFRCR SERPLBLD CKD-EPI 2021: 81 ML/MIN/1.73M*2
EOSINOPHIL # BLD AUTO: 0.2 X10*3/UL (ref 0–0.4)
EOSINOPHIL NFR BLD AUTO: 2.3 %
ERYTHROCYTE [DISTWIDTH] IN BLOOD BY AUTOMATED COUNT: 14 % (ref 11.5–14.5)
GLUCOSE SERPL-MCNC: 86 MG/DL (ref 74–99)
HCT VFR BLD AUTO: 35.5 % (ref 36–46)
HGB BLD-MCNC: 11.4 G/DL (ref 12–16)
IMM GRANULOCYTES # BLD AUTO: 0.03 X10*3/UL (ref 0–0.5)
IMM GRANULOCYTES NFR BLD AUTO: 0.4 % (ref 0–0.9)
LYMPHOCYTES # BLD AUTO: 1.58 X10*3/UL (ref 0.8–3)
LYMPHOCYTES NFR BLD AUTO: 18.5 %
MAGNESIUM SERPL-MCNC: 2.06 MG/DL (ref 1.6–2.4)
MCH RBC QN AUTO: 29.1 PG (ref 26–34)
MCHC RBC AUTO-ENTMCNC: 32.1 G/DL (ref 32–36)
MCV RBC AUTO: 91 FL (ref 80–100)
MONOCYTES # BLD AUTO: 0.57 X10*3/UL (ref 0.05–0.8)
MONOCYTES NFR BLD AUTO: 6.7 %
NEUTROPHILS # BLD AUTO: 6.07 X10*3/UL (ref 1.6–5.5)
NEUTROPHILS NFR BLD AUTO: 70.9 %
NRBC BLD-RTO: 0 /100 WBCS (ref 0–0)
PLATELET # BLD AUTO: 416 X10*3/UL (ref 150–450)
POTASSIUM SERPL-SCNC: 4.3 MMOL/L (ref 3.5–5.3)
RBC # BLD AUTO: 3.92 X10*6/UL (ref 4–5.2)
SODIUM SERPL-SCNC: 138 MMOL/L (ref 136–145)
WBC # BLD AUTO: 8.6 X10*3/UL (ref 4.4–11.3)

## 2024-05-26 PROCEDURE — 83735 ASSAY OF MAGNESIUM: CPT | Performed by: STUDENT IN AN ORGANIZED HEALTH CARE EDUCATION/TRAINING PROGRAM

## 2024-05-26 PROCEDURE — 84484 ASSAY OF TROPONIN QUANT: CPT | Performed by: STUDENT IN AN ORGANIZED HEALTH CARE EDUCATION/TRAINING PROGRAM

## 2024-05-26 PROCEDURE — 85025 COMPLETE CBC W/AUTO DIFF WBC: CPT | Performed by: STUDENT IN AN ORGANIZED HEALTH CARE EDUCATION/TRAINING PROGRAM

## 2024-05-26 PROCEDURE — 36415 COLL VENOUS BLD VENIPUNCTURE: CPT | Performed by: STUDENT IN AN ORGANIZED HEALTH CARE EDUCATION/TRAINING PROGRAM

## 2024-05-26 PROCEDURE — 80048 BASIC METABOLIC PNL TOTAL CA: CPT | Performed by: STUDENT IN AN ORGANIZED HEALTH CARE EDUCATION/TRAINING PROGRAM

## 2024-05-26 PROCEDURE — 99283 EMERGENCY DEPT VISIT LOW MDM: CPT

## 2024-05-26 PROCEDURE — 83880 ASSAY OF NATRIURETIC PEPTIDE: CPT | Performed by: STUDENT IN AN ORGANIZED HEALTH CARE EDUCATION/TRAINING PROGRAM

## 2024-05-26 PROCEDURE — 93005 ELECTROCARDIOGRAM TRACING: CPT

## 2024-05-26 ASSESSMENT — LIFESTYLE VARIABLES
TOTAL SCORE: 0
HAVE PEOPLE ANNOYED YOU BY CRITICIZING YOUR DRINKING: NO
HAVE YOU EVER FELT YOU SHOULD CUT DOWN ON YOUR DRINKING: NO
EVER FELT BAD OR GUILTY ABOUT YOUR DRINKING: NO
EVER HAD A DRINK FIRST THING IN THE MORNING TO STEADY YOUR NERVES TO GET RID OF A HANGOVER: NO

## 2024-05-26 ASSESSMENT — PAIN SCALES - GENERAL: PAINLEVEL_OUTOF10: 0 - NO PAIN

## 2024-05-26 ASSESSMENT — PAIN - FUNCTIONAL ASSESSMENT: PAIN_FUNCTIONAL_ASSESSMENT: 0-10

## 2024-05-26 ASSESSMENT — COLUMBIA-SUICIDE SEVERITY RATING SCALE - C-SSRS
6. HAVE YOU EVER DONE ANYTHING, STARTED TO DO ANYTHING, OR PREPARED TO DO ANYTHING TO END YOUR LIFE?: NO
1. IN THE PAST MONTH, HAVE YOU WISHED YOU WERE DEAD OR WISHED YOU COULD GO TO SLEEP AND NOT WAKE UP?: NO
2. HAVE YOU ACTUALLY HAD ANY THOUGHTS OF KILLING YOURSELF?: NO

## 2024-05-26 NOTE — ED TRIAGE NOTES
Pt to ER via triage, c/o brief moment of not feeling right at 6;20 AM today.  Pt described the feeling as short electrical impulse went through her body, and it only lasted a few seconds. Pt didn't report loss of consciousness, no slurred speech, no confusion. Pt states she had a similar episode two years ago. Hx of stroke 2022.

## 2024-05-26 NOTE — ED PROVIDER NOTES
"HPI   Chief Complaint   Patient presents with    brief episode of not feeling well this morning        Past medical history includes CVA with residual left-sided deficits, PE on Eliquis, MVR, hypertension, and hyperlipidemia presents with a now resolved episode of lightheadedness.  Patient states that just after 6 AM this morning she had a brief episode of lightheadedness.  States that she was standing after breakfast when she suddenly turned and felt lightheaded.  She states that she was able to lower herself on the bed before she fell.  She not strike her head.  She not lose consciousness.  States that this lasted \"seconds.\"  During this time, she did not experience headache, vision changes, difficulty talking, difficulty swallowing, nausea, vomiting, chest pain, shortness of breath, abdominal pain, or new focal numbness/weakness.  She currently feels like she is back at her baseline.  She is concerned she possibly had a stroke, given her history.  She is also concerned that she may need an echocardiogram, which she states is ordered for August but wonders if she needs it sooner.      History provided by:  Patient (Son at bedside)   used: No                        [place] Coma Scale Score: 15                     Patient History   Past Medical History:   Diagnosis Date    Personal history of other diseases of the circulatory system     History of mitral valve prolapse    Personal history of other specified conditions     History of chest pain    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     Varicose veins of lower extremities with inflammation     Past Surgical History:   Procedure Laterality Date    BREAST BIOPSY  07/09/2018    Biopsy Breast Open    CATARACT EXTRACTION  07/09/2018    Cataract Surgery    MR HEAD ANGIO WO IV CONTRAST  12/27/2022    MR HEAD ANGIO WO IV CONTRAST 12/27/2022 [facility]    MR NECK ANGIO WO IV CONTRAST  12/27/2022    MR NECK ANGIO WO IV " CONTRAST 12/27/2022 DOCTOR OFFICE [facility]GAC[facility]     Family History   Problem Relation Name Age of Onset    Heart failure Mother      Hypertension Mother      Other (malignant neoplasm of breast) Mother      Other (cva) Father      Hypertension Father      Other (myocardial infarction) Father      Other (stroke-in-evolution syndrome) Father      Multiple sclerosis Sister      Hypertension Brother      Alzheimer's disease Mother's Sister       Social History     Tobacco Use    Smoking status: Never    Smokeless tobacco: Never   Substance Use Topics    Alcohol use: Never    Drug use: Never       Physical Exam   ED Triage Vitals [05/26/24 1604]   Temperature Heart Rate Respirations BP   36.6 °C (97.9 °F) 86 18 163/76      Pulse Ox Temp src Heart Rate Source Patient Position   97 % -- Monitor Sitting      BP Location FiO2 (%)     Right arm --       Physical Exam  Vitals and nursing note reviewed.   HENT:      Head: Atraumatic.      Mouth/Throat:      Mouth: Mucous membranes are moist.   Eyes:      Extraocular Movements: Extraocular movements intact.      Conjunctiva/sclera: Conjunctivae normal.      Pupils: Pupils are equal, round, and reactive to light.   Cardiovascular:      Rate and Rhythm: Normal rate and regular rhythm.      Comments: There is no pitting lower extremity edema or erythema.  Pulmonary:      Effort: Pulmonary effort is normal.      Breath sounds: Normal breath sounds.   Abdominal:      Palpations: Abdomen is soft.      Tenderness: There is no abdominal tenderness.   Musculoskeletal:         General: No deformity.      Cervical back: Normal range of motion. No rigidity.      Comments: No spinal tenderness to palpation.  Pelvis is stable.  Bilateral upper extremities: No obvious deformities.  No bony tenderness palpation.  Baseline range of motion.  Neurovascular intact.  Bilateral lower extremities: No obvious deformities.  No bony tenderness palpation.  Baseline range of motion.  Neurovascularly intact.    Skin:     General: Skin is warm and dry.   Neurological:      Mental Status: She is alert.      Comments: Mentating appropriately.  Speech is fluent.  Can identify objects in the room.  Cranial nerves II through XII are grossly intact.  Sensation intact to light touch in all extremities.  Patient has her baseline residual left-sided deficits from prior CVA.  GCS 15.         ED Course & MDM   ED Course as of 05/26/24 1819   Sun May 26, 2024   1659 ECG 12 lead  My ECG interpretation: Normal sinus rhythm, rate 72, no ST segment elevation, QTc 432; largely unchanged compared to prior in system [AB]   1735 Low risk [place] syncope risk were of 0 [AB]   1818 12/20/2022 echocardiogram:  CONCLUSIONS:   1. Left ventricular systolic function is normal with a 70-75% estimated ejection fraction.   2. The mitral valve is moderately thickened. 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   3. Moderate mitral valve regurgitation.   4. Slightly elevated RVSP. [AB]      ED Course User Index  [AB] Adria Agosto MD         Diagnoses as of 05/26/24 1819   Lightheadedness       Medical Decision Making  Past medical history as per the HPI presents with a now resolved, very brief episode of lightheadedness while standing and turning.  No loss of consciousness or head trauma.  No signs of trauma on exam.  No new neurologic deficits.  Patient is euvolemic on exam.    With the standing and sitting motion, certainly could have been a vasovagal episode.    Given structural heart issues, did consider cardiac etiology.  Patient is not complaining of chest pain, shortness of breath, or worsening dyspnea on exertion.  She is euvolemic on exam.  ECG is reassuring.  BNP and troponin reassuring.  Electrolytes reassuring.  Cardiac etiology seems less likely.  Did discuss/consider admitting for echocardiogram.  However, would be unable to obtain an echocardiogram until Tuesday with the holiday.  At this point, I do not think that the patient  requires admission to have this test done days later.    Did consider intracranial pathology.  No reports of headache or focal neurologic deficits.  No new focal neurologic deficits on exam.  Seems very unlikely to be a stroke.  Seems unlikely to be a bleed, given patient's description of symptoms.  Shared decision making not to obtain CT of the head and at this time.    Given reassuring workup, patient and son at bedside feel comfortable discharge home.  Will reach out to the cardiologist office.    Amount and/or Complexity of Data Reviewed  Independent Historian:      Details: Son at bedside  External Data Reviewed: notes.     Details: Most recent cardiology note  Labs: ordered.  ECG/medicine tests: ordered and independent interpretation performed. Decision-making details documented in ED Course.        Procedure  Procedures     Adria Agosto MD  05/26/24 0830

## 2024-05-28 ENCOUNTER — TELEPHONE (OUTPATIENT)
Dept: CARDIOLOGY | Facility: CLINIC | Age: 79
End: 2024-05-28
Payer: MEDICARE

## 2024-05-28 ENCOUNTER — NURSING HOME VISIT (OUTPATIENT)
Dept: POST ACUTE CARE | Facility: EXTERNAL LOCATION | Age: 79
End: 2024-05-28
Payer: MEDICARE

## 2024-05-28 DIAGNOSIS — I82.462 ACUTE DEEP VEIN THROMBOSIS (DVT) OF CALF MUSCLE VEIN OF LEFT LOWER EXTREMITY (MULTI): ICD-10-CM

## 2024-05-28 DIAGNOSIS — K51.911 ULCERATIVE COLITIS WITH RECTAL BLEEDING, UNSPECIFIED LOCATION (MULTI): ICD-10-CM

## 2024-05-28 DIAGNOSIS — Z86.73 HISTORY OF CEREBROVASCULAR ACCIDENT: ICD-10-CM

## 2024-05-28 DIAGNOSIS — E43 SEVERE PROTEIN-CALORIE MALNUTRITION (MULTI): ICD-10-CM

## 2024-05-28 PROCEDURE — 99349 HOME/RES VST EST MOD MDM 40: CPT | Performed by: EMERGENCY MEDICINE

## 2024-05-28 NOTE — TELEPHONE ENCOUNTER
Per pt's dtr - pt went for a second opinion at another cardiologist office at [facility] and was prescribed Eliquis 2.5mg BID. Reviewed with Dr. Perez. Pt started on Eliquis in March. Per Dr. Perez - Ok to continue Eliquis 2.5mg BID for a total of 6 months (September 1, 2024). Spoke to pt and made aware. Order faxed to [facility].

## 2024-05-28 NOTE — TELEPHONE ENCOUNTER
----- Message from Janice Ko sent at 5/25/2024 10:57 AM EDT -----  Regarding: Eloquis  Contact: [phone]  Hi Dr. Perez. My mom, Lela, is still concerned about stopping the Eloquis and being on 2.5mg instead of the 5mg she was originally on.   Her platelet count as of 5/22 was 504k. They were 388K on April 15.   Should she go back up to 5? She is wondering if she should stay on Eloquis because of the high platelet count.    Thank you,   Chely Meng (daughter)

## 2024-05-28 NOTE — LETTER
Patient: Janice Ko  : 1945    Encounter Date: 2024    Janice Ko   78 y.o.  female  @[email or web address]@                 Assessment and Plan:  Patient is a long-term resident of [facility] living     77-year-old     Accidental fall  Communicated fracture of distal radius  Ulnar styloid fracture  Left hip intertrochanteric fracture  S/p CVA with baseline weakness  Hypertension  Ulcerative colitis        s/p L hip Cephallomedullary nail for intertrochanteric femur fracture  Pain control  Patient was transfused for low hemoglobin-  H&H is stable since then  Blood pressure is low-hold antihypertensives  Wrist has been splinted  Continue baseline meds  GI due to prophylaxis  OT PT eval after likely surgery     ascorbic acid 500 mg oral tablet 500 mg = 1 tabs, ORAL, DAILY  aspirin 81 mg oral delayed release tablet 81 mg = 1 tabs, ORAL, DAILY  CoQ10 100 mg oral capsule 200 mg = 2 caps, ORAL, DAILY  Crestor 20 mg oral tablet 20 mg = 1 tabs, ORAL, DAILY  ferrous sulfate 325 mg (65 mg elemental iron) oral tablet 325 mg = 1 tabs, ORAL, EVERY OTHER DAY  lactobacillus acidophilus = Floranex, Florajen, Lactinex Granules 1 packets, ORAL, DAILY  losartan 50 mg oral tablet 50 mg = 1 tabs, ORAL, BID  multivitamin 1 tabs, ORAL, DAILY  Norvasc 10 mg oral tablet 5 mg = 0.5 tabs, ORAL, DAILY  Pentasa 500 mg oral capsule, extended release 1,000 mg = 2 caps, ORAL, TID  Ultram 50 mg oral tablet 100 mg = 2 tabs, PRN, ORAL, [id number]  Vitamin D3 25 mcg (1000 intl units) oral capsule 25 mcg = 1 caps, ORAL, DAILY     This patient was seen for my regular monthly visit, nursing evaluations and nursing notes were reviewed, interim events are reviewed, interim concerns and messages were reviewed as we have communicated with nursing staff.  Any issues with the falls, skin care impairment, declining physical condition are reviewed and noted, diagnosis list were reviewed, list of medications were reviewed, living will related issues  were reviewed, overall patient has been doing well, any declining in patient's condition or any change in patient's condition needs to be notified to physician promptly, discussed with nursing staff, if needed would communicate with family.  Patient stays confined here at the facility for long-term care, there are always concerns about long-term care related issues and concerns.  Nursing staff is trying their best to keep patient safe, all sort of measures has been taken to keep patient safe and comfortable.         Source of history: Nurse, Medical personnel, Medical record, Patient.  History limitation: None.     -Fall prevention    -Cognitive engagement     -Monitor and treat blood pressure     -Aggressive decubitus ulcer prevention.     -Bowel and bladder care     -Optimal nutrition and supplementation as needed     -GI  and DVT prophylaxis     -Symptom control     -Ambulation as tolerated     -Will follow    Charting is done using voice recognition software and may contain errors which have not been completely corrected    Histories   Past Medical History: Past Medical HistoryNo qualifying data available.      Family History: Son: Ulcerative colitis..      Procedure history: Colonoscopy.: 2018  Cataract: 2014  History of Biopsy Breast Open  x 2      Social History        Social & Psychosocial History  Social History  Alcohol     Current, Beer, Wine, 3-5 times per week     Exercise  Regular exercise     Other       Comment: G4 SVDx4 (09/23/2020 11:34 - Diane Mullins)     Sexual  No Sexual Activity     Substance Abuse  Denies Substance Abuse     Tobacco  Denies Tobacco Use  Never (less than 100 in lifetime) Tobacco Use:.     Psychosocial History              No active psychosocial history has been recorded  .        Health Status      Allergies (4) Active            Reaction  EPINEPHrine            None Documented  erythromycin           None Documented  sulfa drugs   None Documented  tetanus toxoid          temperature, swelling     Active Problems (46)  Acute deep venous thrombosis of calf..   Anemia   Anemia   Anxiety   Anxiety   Arthritis   Arthritis   At risk for falls   Atypical chest pain   Benign essential hypertension   Blood Products Refused   Blood Products Refused   Cerebrovascular accident   COVID-19   Cystocele   Decreased body mass index   Delay when starting to pass urine   Difficulty walking   Electrocardiogram abnormal   Finding of functional performance and activity   Gastroesophageal reflux disease   Hemiparesis as late effect of cerebrovascular accident   History of anemia   History of chest pain   Hyperlipidemia   Hypertension   Hypertensive disorder   Increased frequency of urination   Iron deficiency anemia   Left lower quadrant pain   Leukocytosis   Loss of hair   Mitral valve regurgitation   Palpitations   Paroxysmal supraventricular tachycardia   Patient encounter status   Seasonal allergic rhinitis   Tick bite   Ulcerative colitis   Ulcerative colitis   Urinary hesitancy   Uterine prolapse   Uterine prolapse   Vaginal bleeding.   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       • calcium carbonate-vitamin D3 (Oyster Shell) 250 mg-3.125 mcg (125 unit) tablet Take by mouth 2 times a day with meals.       • calcium carbonate-vitamin D3 600 mg-5 mcg (200 unit) tablet Take by mouth every 8 hours.       • cholecalciferol (Vitamin D-3) 25 MCG (1000 UT) tablet         • co-enzyme Q-10 50 mg capsule         • [employer] 10 billion cell -200 mg capsule, sprinkle         • Eliquis 2.5 mg tablet         • ferrous sulfate, 325 mg ferrous sulfate, (FerrouSuL) tablet Take 1 tablet by mouth once daily with breakfast. 90  tablet 3   • L.acid,ferm,ricardo,rha-B.bif,long (Controlled Delivery Probiotic) 126 mg (2 billion cell) tablet,delayed and ext.release Take by mouth once daily.       • lidocaine (Lidoderm) 5 % patch Place 1 patch on the skin once daily.       • losartan (Cozaar) 50 mg tablet Take 1 tablet (50 mg) by mouth once daily. 60 tablet 0   • multivitamin tablet Take 1 tablet by mouth once daily.       • multivitamin with minerals (multivit-min-iron fum-folic ac) tablet Take by mouth.       • multivitamin with minerals (multivit-min-iron fum-folic ac) tablet Take 1 tablet by mouth once daily.       • Pentasa 500 mg ER capsule Take 1 capsule (500 mg) by mouth 3 times a day. 3 pills in the morning  2 pills at 1200   3 at 6 pm     Per Dr. Talavera       • pyridoxine (Vitamin B-6) 50 mg tablet TAKE 1 BY MOUTH ONCE A DAY FOR 14 DAYS          No current facility-administered medications for this visit.            Physical Exam:  Vital signs as per nursing/MA documentation were reviewed  General appearance: Alert and in no acute distress  HEENT: Normal Inspection  Neck - Normal Inspection  Respiratory : No respiratory distress. Lungs are clear   Cardiovascular: heart rate normal. No gallop  Back - normal inspection  Skin inspection:Warm  Musculoskeletal : No deformities  Neuro : Limited exam. Baseline     ROS: Review of symptoms is negative except for what is mentioned in HPI     Results/Data  Records including but not limited to electronic medical records, relevant lab work and imaging from patient's health care facility encounter were reviewed and independently verified        Charting was completed using voice recognition technology and may include unintended errors.     Discussed with patient/family, RN, and NP.      Electronically Signed By: Carlo Oh MD   6/8/24  8:37 AM

## 2024-05-29 LAB
ATRIAL RATE: 73 BPM
P AXIS: 73 DEGREES
PR INTERVAL: 148 MS
Q ONSET: 249 MS
QRS COUNT: 12 BEATS
QRS DURATION: 97 MS
QT INTERVAL: 394 MS
QTC CALCULATION(BAZETT): 432 MS
QTC FREDERICIA: 418 MS
R AXIS: 62 DEGREES
T AXIS: 51 DEGREES
T OFFSET: 446 MS
VENTRICULAR RATE: 72 BPM

## 2024-05-31 RX ORDER — ASPIRIN 81 MG/1
81 TABLET ORAL DAILY
COMMUNITY
Start: 2024-05-31

## 2024-05-31 NOTE — TELEPHONE ENCOUNTER
Lizy, Care Coordinator from [facility] called this am, asking for verbal order for Eliquis and/or refax. They were having fax problems yesterday.    Pt also called office and left a VM, GI ok'd pt to take eliquis 2.5 bid until Sept 2024.     I refaxed Eliquis order to [facility] to new number provided by Lizy. I also called and gave verbal order.    Lizy then asked if pt should be taking asa 81 daily in addition to eliquis or just after eliquis is completed in Sept? I reviewed with Dr Perez, continue asa. Med list updated.

## 2024-06-03 ENCOUNTER — APPOINTMENT (OUTPATIENT)
Dept: CARDIOLOGY | Facility: CLINIC | Age: 79
End: 2024-06-03
Payer: MEDICARE

## 2024-06-03 ENCOUNTER — TELEPHONE (OUTPATIENT)
Dept: CARDIOLOGY | Facility: CLINIC | Age: 79
End: 2024-06-03
Payer: MEDICARE

## 2024-06-03 DIAGNOSIS — I82.462 ACUTE DEEP VEIN THROMBOSIS (DVT) OF CALF MUSCLE VEIN OF LEFT LOWER EXTREMITY (MULTI): ICD-10-CM

## 2024-06-03 RX ORDER — APIXABAN 2.5 MG/1
2.5 TABLET, FILM COATED ORAL 2 TIMES DAILY
Qty: 180 TABLET | Refills: 0 | Status: SHIPPED | OUTPATIENT
Start: 2024-06-03 | End: 2024-09-01

## 2024-06-03 NOTE — TELEPHONE ENCOUNTER
Eliquis 2.5 mg tablet       [facility] - [place], [place] - [address] [address] Phone: [phone]   Fax: [phone]

## 2024-06-03 NOTE — TELEPHONE ENCOUNTER
mir is wondering why she has to take asa 81mg daily, in addition to eliquis. Please call her.  She worried about her colitis and flare ups

## 2024-06-03 NOTE — TELEPHONE ENCOUNTER
Pt called, needs eliquis sent to [facility] pharmacy and she is wondering why she has to take asa 81mg daily, in addition to eliquis.    Dr Perez stated on 5/31, that he would like her to take asa in addition to eliquis 2.5, was faxed to pt's nursing facility.    I attempted to call pt twice today, unsuccessful. I spoke to pt's son, notified him that eliqu[facility]. He states that she has hx of colitis and asa causes GI problems. Pt said in VM that she has previously refused.

## 2024-06-04 NOTE — TELEPHONE ENCOUNTER
Reviewed with Dr. Perez patient's concerns. Per Dr. Perez verbal order: Ok to discontinue aspirin while taking Eliquis. Attempted to call patient.

## 2024-06-06 NOTE — PROGRESS NOTES
Janice Ko   78 y.o.  female  @location@                 Assessment and Plan:  Patient is a long-term resident of [facility] assisted living     77-year-old     Accidental fall  Communicated fracture of distal radius  Ulnar styloid fracture  Left hip intertrochanteric fracture  S/p CVA with baseline weakness  Hypertension  Ulcerative colitis        s/p L hip Cephallomedullary nail for intertrochanteric femur fracture  Pain control  Patient was transfused for low hemoglobin-  H&H is stable since then  Blood pressure is low-hold antihypertensives  Wrist has been splinted  Continue baseline meds  GI due to prophylaxis  OT PT eval after likely surgery     ascorbic acid 500 mg oral tablet 500 mg = 1 tabs, ORAL, DAILY  aspirin 81 mg oral delayed release tablet 81 mg = 1 tabs, ORAL, DAILY  CoQ10 100 mg oral capsule 200 mg = 2 caps, ORAL, DAILY  Crestor 20 mg oral tablet 20 mg = 1 tabs, ORAL, DAILY  ferrous sulfate 325 mg (65 mg elemental iron) oral tablet 325 mg = 1 tabs, ORAL, EVERY OTHER DAY  lactobacillus acidophilus = Floranex, Florajen, Lactinex Granules 1 packets, ORAL, DAILY  losartan 50 mg oral tablet 50 mg = 1 tabs, ORAL, BID  multivitamin 1 tabs, ORAL, DAILY  Norvasc 10 mg oral tablet 5 mg = 0.5 tabs, ORAL, DAILY  Pentasa 500 mg oral capsule, extended release 1,000 mg = 2 caps, ORAL, TID  Ultram 50 mg oral tablet 100 mg = 2 tabs, PRN, ORAL, [id number]  Vitamin D3 25 mcg (1000 intl units) oral capsule 25 mcg = 1 caps, ORAL, DAILY     This patient was seen for my regular monthly visit, nursing evaluations and nursing notes were reviewed, interim events are reviewed, interim concerns and messages were reviewed as we have communicated with nursing staff.  Any issues with the falls, skin care impairment, declining physical condition are reviewed and noted, diagnosis list were reviewed, list of medications were reviewed, living will related issues were reviewed, overall patient has been doing well, any declining in  patient's condition or any change in patient's condition needs to be notified to physician promptly, discussed with nursing staff, if needed would communicate with family.  Patient stays confined here at the facility for long-term care, there are always concerns about long-term care related issues and concerns.  Nursing staff is trying their best to keep patient safe, all sort of measures has been taken to keep patient safe and comfortable.         Source of history: Nurse, Medical personnel, Medical record, Patient.  History limitation: None.     -Fall prevention    -Cognitive engagement     -Monitor and treat blood pressure     -Aggressive decubitus ulcer prevention.     -Bowel and bladder care     -Optimal nutrition and supplementation as needed     -GI  and DVT prophylaxis     -Symptom control     -Ambulation as tolerated     -Will follow    Charting is done using voice recognition software and may contain errors which have not been completely corrected    Histories   Past Medical History: Past Medical HistoryNo qualifying data available.      Family History: Son: Ulcerative colitis..      Procedure history: Colonoscopy.: 2018  Cataract: 2014  History of Biopsy Breast Open  x 2      Social History        Social & Psychosocial History  Social History  Alcohol     Current, Beer, Wine, 3-5 times per week     Exercise  Regular exercise     Other       Comment: G4 SVDx4 (09/23/2020 11:34 - Diane Mullins)     Sexual  No Sexual Activity     Substance Abuse  Denies Substance Abuse     Tobacco  Denies Tobacco Use  Never (less than 100 in lifetime) Tobacco Use:.     Psychosocial History              No active psychosocial history has been recorded  .        Health Status      Allergies (4) Active            Reaction  EPINEPHrine            None Documented  erythromycin           None Documented  sulfa drugs   None Documented  tetanus toxoid         temperature, swelling     Active Problems (46)  Acute deep  venous thrombosis of calf..   Anemia   Anemia   Anxiety   Anxiety   Arthritis   Arthritis   At risk for falls   Atypical chest pain   Benign essential hypertension   Blood Products Refused   Blood Products Refused   Cerebrovascular accident   COVID-19   Cystocele   Decreased body mass index   Delay when starting to pass urine   Difficulty walking   Electrocardiogram abnormal   Finding of functional performance and activity   Gastroesophageal reflux disease   Hemiparesis as late effect of cerebrovascular accident   History of anemia   History of chest pain   Hyperlipidemia   Hypertension   Hypertensive disorder   Increased frequency of urination   Iron deficiency anemia   Left lower quadrant pain   Leukocytosis   Loss of hair   Mitral valve regurgitation   Palpitations   Paroxysmal supraventricular tachycardia   Patient encounter status   Seasonal allergic rhinitis   Tick bite   Ulcerative colitis   Ulcerative colitis   Urinary hesitancy   Uterine prolapse   Uterine prolapse   Vaginal bleeding.   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        calcium carbonate-vitamin D3 (Oyster Shell) 250 mg-3.125 mcg (125 unit) tablet Take by mouth 2 times a day with meals.        calcium carbonate-vitamin D3 600 mg-5 mcg (200 unit) tablet Take by mouth every 8 hours.        cholecalciferol (Vitamin D-3) 25 MCG (1000 UT) tablet          co-enzyme Q-10 50 mg capsule          [facility] 10 billion cell -200 mg capsule, sprinkle          Eliquis 2.5 mg tablet          ferrous sulfate, 325 mg ferrous sulfate, (FerrouSuL) tablet Take 1 tablet by mouth once daily with breakfast. 90 tablet 3    L.acid,ferm,ricardo,rha-B.bif,long (Controlled Delivery Probiotic)  126 mg (2 billion cell) tablet,delayed and ext.release Take by mouth once daily.        lidocaine (Lidoderm) 5 % patch Place 1 patch on the skin once daily.        losartan (Cozaar) 50 mg tablet Take 1 tablet (50 mg) by mouth once daily. 60 tablet 0    multivitamin tablet Take 1 tablet by mouth once daily.        multivitamin with minerals (multivit-min-iron fum-folic ac) tablet Take by mouth.        multivitamin with minerals (multivit-min-iron fum-folic ac) tablet Take 1 tablet by mouth once daily.        Pentasa 500 mg ER capsule Take 1 capsule (500 mg) by mouth 3 times a day. 3 pills in the morning  2 pills at 1200   3 at 6 pm     Per Dr. Talavera        pyridoxine (Vitamin B-6) 50 mg tablet TAKE 1 BY MOUTH ONCE A DAY FOR 14 DAYS          No current facility-administered medications for this visit.            Physical Exam:  Vital signs as per nursing/MA documentation were reviewed  General appearance: Alert and in no acute distress  HEENT: Normal Inspection  Neck - Normal Inspection  Respiratory : No respiratory distress. Lungs are clear   Cardiovascular: heart rate normal. No gallop  Back - normal inspection  Skin inspection:Warm  Musculoskeletal : No deformities  Neuro : Limited exam. Baseline     ROS: Review of symptoms is negative except for what is mentioned in HPI     Results/Data  Records including but not limited to electronic medical records, relevant lab work and imaging from patient's health care facility encounter were reviewed and independently verified        Charting was completed using voice recognition technology and may include unintended errors.     Discussed with patient/family, RN, and NP.

## 2024-06-18 ENCOUNTER — TELEPHONE (OUTPATIENT)
Dept: CARDIOLOGY | Facility: CLINIC | Age: 79
End: 2024-06-18
Payer: MEDICARE

## 2024-06-19 NOTE — TELEPHONE ENCOUNTER
Per Dr. Perez- yes, patient can cont iron supplement with Eliquis.    Spoke with patient's son and aware.

## 2024-06-21 DIAGNOSIS — I10 BENIGN ESSENTIAL HYPERTENSION: ICD-10-CM

## 2024-06-21 RX ORDER — LOSARTAN POTASSIUM 50 MG/1
50 TABLET ORAL DAILY
Qty: 60 TABLET | Refills: 6 | Status: SHIPPED | OUTPATIENT
Start: 2024-06-21

## 2024-06-25 ENCOUNTER — NURSING HOME VISIT (OUTPATIENT)
Dept: POST ACUTE CARE | Facility: EXTERNAL LOCATION | Age: 79
End: 2024-06-25
Payer: MEDICARE

## 2024-06-25 DIAGNOSIS — R00.2 PALPITATIONS: Primary | ICD-10-CM

## 2024-06-25 DIAGNOSIS — E78.5 HYPERLIPIDEMIA, UNSPECIFIED HYPERLIPIDEMIA TYPE: ICD-10-CM

## 2024-06-25 DIAGNOSIS — R07.2 PRECORDIAL PAIN: ICD-10-CM

## 2024-06-25 DIAGNOSIS — J44.9 CHRONIC OBSTRUCTIVE PULMONARY DISEASE, UNSPECIFIED COPD TYPE (MULTI): ICD-10-CM

## 2024-06-25 DIAGNOSIS — M62.81 MUSCLE WEAKNESS (GENERALIZED): ICD-10-CM

## 2024-06-25 DIAGNOSIS — I10 BENIGN ESSENTIAL HYPERTENSION: ICD-10-CM

## 2024-06-25 PROCEDURE — 99348 HOME/RES VST EST LOW MDM 30: CPT | Performed by: EMERGENCY MEDICINE

## 2024-06-25 NOTE — LETTER
Patient: Janice Ko  : 1945    Encounter Date: 2024    Janice Ko   78 y.o.  female  @location@                 Assessment and Plan:  Patient is a long-term resident of [facility] living     77-year-old     Accidental fall  Communicated fracture of distal radius  Ulnar styloid fracture  Left hip intertrochanteric fracture  S/p CVA with baseline weakness  Hypertension  Ulcerative colitis        s/p L hip Cephallomedullary nail for intertrochanteric femur fracture  Pain control  Patient was transfused for low hemoglobin-  H&H is stable since then  Blood pressure is low-hold antihypertensives  Wrist has been splinted  Continue baseline meds  GI due to prophylaxis  OT PT eval after likely surgery     ascorbic acid 500 mg oral tablet 500 mg = 1 tabs, ORAL, DAILY  aspirin 81 mg oral delayed release tablet 81 mg = 1 tabs, ORAL, DAILY  CoQ10 100 mg oral capsule 200 mg = 2 caps, ORAL, DAILY  Crestor 20 mg oral tablet 20 mg = 1 tabs, ORAL, DAILY  ferrous sulfate 325 mg (65 mg elemental iron) oral tablet 325 mg = 1 tabs, ORAL, EVERY OTHER DAY  lactobacillus acidophilus = Floranex, Florajen, Lactinex Granules 1 packets, ORAL, DAILY  losartan 50 mg oral tablet 50 mg = 1 tabs, ORAL, BID  multivitamin 1 tabs, ORAL, DAILY  Norvasc 10 mg oral tablet 5 mg = 0.5 tabs, ORAL, DAILY  Pentasa 500 mg oral capsule, extended release 1,000 mg = 2 caps, ORAL, TID  Ultram 50 mg oral tablet 100 mg = 2 tabs, PRN, ORAL, [id number]  Vitamin D3 25 mcg (1000 intl units) oral capsule 25 mcg = 1 caps, ORAL, DAILY     This patient was seen for my regular monthly visit, nursing evaluations and nursing notes were reviewed, interim events are reviewed, interim concerns and messages were reviewed as we have communicated with nursing staff.  Any issues with the falls, skin care impairment, declining physical condition are reviewed and noted, diagnosis list were reviewed, list of medications were reviewed, living will related issues  were reviewed, overall patient has been doing well, any declining in patient's condition or any change in patient's condition needs to be notified to physician promptly, discussed with nursing staff, if needed would communicate with family.  Patient stays confined here at the facility for long-term care, there are always concerns about long-term care related issues and concerns.  Nursing staff is trying their best to keep patient safe, all sort of measures has been taken to keep patient safe and comfortable.         Source of history: Nurse, Medical personnel, Medical record, Patient.  History limitation: None.     Provide safe environment for the patient    Continue current medication regimen    OT PT and speech therapy    Bowel and bladder,skin care    Nutritional support    Monitor and treat blood glucose    GI  and DVT prophylaxis    PRN medications    Periodic lab work    Regular Follow up      Charting is done using voice recognition software and may contain errors which have not been completely corrected    Histories   Past Medical History: Past Medical HistoryNo qualifying data available.      Family History: Son: Ulcerative colitis..      Procedure history: Colonoscopy.: 2018  Cataract: 2014  History of Biopsy Breast Open  x 2      Social History        Social & Psychosocial History  Social History  Alcohol     Current, Beer, Wine, 3-5 times per week     Exercise  Regular exercise     Other       Comment: G4 SVDx4 (09/23/2020 11:34 - Diane Mullins)     Sexual  No Sexual Activity     Substance Abuse  Denies Substance Abuse     Tobacco  Denies Tobacco Use  Never (less than 100 in lifetime) Tobacco Use:.     Psychosocial History              No active psychosocial history has been recorded  .        Health Status      Allergies (4) Active            Reaction  EPINEPHrine            None Documented  erythromycin           None Documented  sulfa drugs   None Documented  tetanus toxoid          temperature, swelling     Active Problems (46)  Acute deep venous thrombosis of calf..   Anemia   Anemia   Anxiety   Anxiety   Arthritis   Arthritis   At risk for falls   Atypical chest pain   Benign essential hypertension   Blood Products Refused   Blood Products Refused   Cerebrovascular accident   COVID-19   Cystocele   Decreased body mass index   Delay when starting to pass urine   Difficulty walking   Electrocardiogram abnormal   Finding of functional performance and activity   Gastroesophageal reflux disease   Hemiparesis as late effect of cerebrovascular accident   History of anemia   History of chest pain   Hyperlipidemia   Hypertension   Hypertensive disorder   Increased frequency of urination   Iron deficiency anemia   Left lower quadrant pain   Leukocytosis   Loss of hair   Mitral valve regurgitation   Palpitations   Paroxysmal supraventricular tachycardia   Patient encounter status   Seasonal allergic rhinitis   Tick bite   Ulcerative colitis   Ulcerative colitis   Urinary hesitancy   Uterine prolapse   Uterine prolapse   Vaginal bleeding.   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       • calcium carbonate-vitamin D3 (Oyster Shell) 250 mg-3.125 mcg (125 unit) tablet Take by mouth 2 times a day with meals.       • calcium carbonate-vitamin D3 600 mg-5 mcg (200 unit) tablet Take by mouth every 8 hours.       • cholecalciferol (Vitamin D-3) 25 MCG (1000 UT) tablet         • co-enzyme Q-10 50 mg capsule         • [facility] 10 billion cell -200 mg capsule, sprinkle         • Eliquis 2.5 mg tablet         • ferrous sulfate, 325 mg ferrous sulfate, (FerrouSuL) tablet Take 1 tablet by mouth once daily with breakfast. 90 tablet  3   • L.acid,ferm,ricardo,rha-B.bif,long (Controlled Delivery Probiotic) 126 mg (2 billion cell) tablet,delayed and ext.release Take by mouth once daily.       • lidocaine (Lidoderm) 5 % patch Place 1 patch on the skin once daily.       • losartan (Cozaar) 50 mg tablet Take 1 tablet (50 mg) by mouth once daily. 60 tablet 0   • multivitamin tablet Take 1 tablet by mouth once daily.       • multivitamin with minerals (multivit-min-iron fum-folic ac) tablet Take by mouth.       • multivitamin with minerals (multivit-min-iron fum-folic ac) tablet Take 1 tablet by mouth once daily.       • Pentasa 500 mg ER capsule Take 1 capsule (500 mg) by mouth 3 times a day. 3 pills in the morning  2 pills at 1200   3 at 6 pm     Per Dr. Talavera       • pyridoxine (Vitamin B-6) 50 mg tablet TAKE 1 BY MOUTH ONCE A DAY FOR 14 DAYS          No current facility-administered medications for this visit.            Physical Exam:  Vital signs as per nursing/MA documentation were reviewed  General appearance: Alert and in no acute distress  HEENT: Normal Inspection  Neck - Normal Inspection  Respiratory : No respiratory distress. Lungs are clear   Cardiovascular: heart rate normal. No gallop  Back - normal inspection  Skin inspection:Warm  Musculoskeletal : No deformities  Neuro : Limited exam. Baseline     ROS: Review of symptoms is negative except for what is mentioned in HPI     Results/Data  Records including but not limited to electronic medical records, relevant lab work and imaging from patient's health care facility encounter were reviewed and independently verified        Charting was completed using voice recognition technology and may include unintended errors.     Discussed with patient/family, RN, and NP.      Electronically Signed By: Carlo Oh MD   7/30/24  1:36 PM

## 2024-07-19 NOTE — PROGRESS NOTES
Janice Ko   78 y.o.  female  @location@                 Assessment and Plan:  Patient is a long-term resident of [facility] assisted living     77-year-old     Accidental fall  Communicated fracture of distal radius  Ulnar styloid fracture  Left hip intertrochanteric fracture  S/p CVA with baseline weakness  Hypertension  Ulcerative colitis        s/p L hip Cephallomedullary nail for intertrochanteric femur fracture  Pain control  Patient was transfused for low hemoglobin-  H&H is stable since then  Blood pressure is low-hold antihypertensives  Wrist has been splinted  Continue baseline meds  GI due to prophylaxis  OT PT eval after likely surgery     ascorbic acid 500 mg oral tablet 500 mg = 1 tabs, ORAL, DAILY  aspirin 81 mg oral delayed release tablet 81 mg = 1 tabs, ORAL, DAILY  CoQ10 100 mg oral capsule 200 mg = 2 caps, ORAL, DAILY  Crestor 20 mg oral tablet 20 mg = 1 tabs, ORAL, DAILY  ferrous sulfate 325 mg (65 mg elemental iron) oral tablet 325 mg = 1 tabs, ORAL, EVERY OTHER DAY  lactobacillus acidophilus = Floranex, Florajen, Lactinex Granules 1 packets, ORAL, DAILY  losartan 50 mg oral tablet 50 mg = 1 tabs, ORAL, BID  multivitamin 1 tabs, ORAL, DAILY  Norvasc 10 mg oral tablet 5 mg = 0.5 tabs, ORAL, DAILY  Pentasa 500 mg oral capsule, extended release 1,000 mg = 2 caps, ORAL, TID  Ultram 50 mg oral tablet 100 mg = 2 tabs, PRN, ORAL, [id number]  Vitamin D3 25 mcg (1000 intl units) oral capsule 25 mcg = 1 caps, ORAL, DAILY     This patient was seen for my regular monthly visit, nursing evaluations and nursing notes were reviewed, interim events are reviewed, interim concerns and messages were reviewed as we have communicated with nursing staff.  Any issues with the falls, skin care impairment, declining physical condition are reviewed and noted, diagnosis list were reviewed, list of medications were reviewed, living will related issues were reviewed, overall patient has been doing well, any declining in  patient's condition or any change in patient's condition needs to be notified to physician promptly, discussed with nursing staff, if needed would communicate with family.  Patient stays confined here at the facility for long-term care, there are always concerns about long-term care related issues and concerns.  Nursing staff is trying their best to keep patient safe, all sort of measures has been taken to keep patient safe and comfortable.         Source of history: Nurse, Medical personnel, Medical record, Patient.  History limitation: None.     Provide safe environment for the patient    Continue current medication regimen    OT PT and speech therapy    Bowel and bladder,skin care    Nutritional support    Monitor and treat blood glucose    GI  and DVT prophylaxis    PRN medications    Periodic lab work    Regular Follow up      Charting is done using voice recognition software and may contain errors which have not been completely corrected    Histories   Past Medical History: Past Medical HistoryNo qualifying data available.      Family History: Son: Ulcerative colitis..      Procedure history: Colonoscopy.: 2018  Cataract: 2014  History of Biopsy Breast Open  x 2      Social History        Social & Psychosocial History  Social History  Alcohol     Current, Beer, Wine, 3-5 times per week     Exercise  Regular exercise     Other       Comment: G4 SVDx4 (09/23/2020 11:34 - Diane Mullins)     Sexual  No Sexual Activity     Substance Abuse  Denies Substance Abuse     Tobacco  Denies Tobacco Use  Never (less than 100 in lifetime) Tobacco Use:.     Psychosocial History              No active psychosocial history has been recorded  .        Health Status      Allergies (4) Active            Reaction  EPINEPHrine            None Documented  erythromycin           None Documented  sulfa drugs   None Documented  tetanus toxoid         temperature, swelling     Active Problems (46)  Acute deep venous thrombosis  of calf..   Anemia   Anemia   Anxiety   Anxiety   Arthritis   Arthritis   At risk for falls   Atypical chest pain   Benign essential hypertension   Blood Products Refused   Blood Products Refused   Cerebrovascular accident   COVID-19   Cystocele   Decreased body mass index   Delay when starting to pass urine   Difficulty walking   Electrocardiogram abnormal   Finding of functional performance and activity   Gastroesophageal reflux disease   Hemiparesis as late effect of cerebrovascular accident   History of anemia   History of chest pain   Hyperlipidemia   Hypertension   Hypertensive disorder   Increased frequency of urination   Iron deficiency anemia   Left lower quadrant pain   Leukocytosis   Loss of hair   Mitral valve regurgitation   Palpitations   Paroxysmal supraventricular tachycardia   Patient encounter status   Seasonal allergic rhinitis   Tick bite   Ulcerative colitis   Ulcerative colitis   Urinary hesitancy   Uterine prolapse   Uterine prolapse   Vaginal bleeding.   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        calcium carbonate-vitamin D3 (Oyster Shell) 250 mg-3.125 mcg (125 unit) tablet Take by mouth 2 times a day with meals.        calcium carbonate-vitamin D3 600 mg-5 mcg (200 unit) tablet Take by mouth every 8 hours.        cholecalciferol (Vitamin D-3) 25 MCG (1000 UT) tablet          co-enzyme Q-10 50 mg capsule          [facility] 10 billion cell -200 mg capsule, sprinkle          Eliquis 2.5 mg tablet          ferrous sulfate, 325 mg ferrous sulfate, (FerrouSuL) tablet Take 1 tablet by mouth once daily with breakfast. 90 tablet 3    L.acid,ferm,ricardo,rha-B.bif,long (Controlled Delivery Probiotic) 126 mg (2 billion  cell) tablet,delayed and ext.release Take by mouth once daily.        lidocaine (Lidoderm) 5 % patch Place 1 patch on the skin once daily.        losartan (Cozaar) 50 mg tablet Take 1 tablet (50 mg) by mouth once daily. 60 tablet 0    multivitamin tablet Take 1 tablet by mouth once daily.        multivitamin with minerals (multivit-min-iron fum-folic ac) tablet Take by mouth.        multivitamin with minerals (multivit-min-iron fum-folic ac) tablet Take 1 tablet by mouth once daily.        Pentasa 500 mg ER capsule Take 1 capsule (500 mg) by mouth 3 times a day. 3 pills in the morning  2 pills at 1200   3 at 6 pm     Per Dr. Talavera        pyridoxine (Vitamin B-6) 50 mg tablet TAKE 1 BY MOUTH ONCE A DAY FOR 14 DAYS          No current facility-administered medications for this visit.            Physical Exam:  Vital signs as per nursing/MA documentation were reviewed  General appearance: Alert and in no acute distress  HEENT: Normal Inspection  Neck - Normal Inspection  Respiratory : No respiratory distress. Lungs are clear   Cardiovascular: heart rate normal. No gallop  Back - normal inspection  Skin inspection:Warm  Musculoskeletal : No deformities  Neuro : Limited exam. Baseline     ROS: Review of symptoms is negative except for what is mentioned in HPI     Results/Data  Records including but not limited to electronic medical records, relevant lab work and imaging from patient's health care facility encounter were reviewed and independently verified        Charting was completed using voice recognition technology and may include unintended errors.     Discussed with patient/family, RN, and NP.

## 2024-07-23 ENCOUNTER — NURSING HOME VISIT (OUTPATIENT)
Dept: POST ACUTE CARE | Facility: EXTERNAL LOCATION | Age: 79
End: 2024-07-23
Payer: MEDICARE

## 2024-07-23 DIAGNOSIS — K51.911 ULCERATIVE COLITIS WITH RECTAL BLEEDING, UNSPECIFIED LOCATION (MULTI): ICD-10-CM

## 2024-07-23 DIAGNOSIS — I10 BENIGN ESSENTIAL HYPERTENSION: Primary | ICD-10-CM

## 2024-07-23 DIAGNOSIS — I43 CARDIOMYOPATHY, HYPERTENSIVE, BENIGN, WITHOUT HEART FAILURE (MULTI): ICD-10-CM

## 2024-07-23 DIAGNOSIS — S52.509D CLOSED FRACTURE OF DISTAL END OF RADIUS WITH ROUTINE HEALING, UNSPECIFIED FRACTURE MORPHOLOGY, UNSPECIFIED LATERALITY, SUBSEQUENT ENCOUNTER: ICD-10-CM

## 2024-07-23 DIAGNOSIS — I11.9 CARDIOMYOPATHY, HYPERTENSIVE, BENIGN, WITHOUT HEART FAILURE (MULTI): ICD-10-CM

## 2024-07-23 DIAGNOSIS — J44.9 CHRONIC OBSTRUCTIVE PULMONARY DISEASE, UNSPECIFIED COPD TYPE (MULTI): ICD-10-CM

## 2024-07-23 PROCEDURE — 99349 HOME/RES VST EST MOD MDM 40: CPT | Performed by: EMERGENCY MEDICINE

## 2024-07-23 NOTE — LETTER
Patient: Janice Ko  : 1945    Encounter Date: 2024    Janice Ko   78 y.o.  female  @location@                 Assessment and Plan:  Patient is a long-term resident of [facility]     77-year-old     Accidental fall  Communicated fracture of distal radius  Ulnar styloid fracture  Left hip intertrochanteric fracture  S/p CVA with baseline weakness  Hypertension  Ulcerative colitis        s/p L hip Cephallomedullary nail for intertrochanteric femur fracture  Pain control  Patient was transfused for low hemoglobin-  H&H is stable since then  Blood pressure is low-hold antihypertensives  Wrist has been splinted  Continue baseline meds  GI due to prophylaxis  OT PT eval after likely surgery     ascorbic acid 500 mg oral tablet 500 mg = 1 tabs, ORAL, DAILY  aspirin 81 mg oral delayed release tablet 81 mg = 1 tabs, ORAL, DAILY  CoQ10 100 mg oral capsule 200 mg = 2 caps, ORAL, DAILY  Crestor 20 mg oral tablet 20 mg = 1 tabs, ORAL, DAILY  ferrous sulfate 325 mg (65 mg elemental iron) oral tablet 325 mg = 1 tabs, ORAL, EVERY OTHER DAY  lactobacillus acidophilus = Floranex, Florajen, Lactinex Granules 1 packets, ORAL, DAILY  losartan 50 mg oral tablet 50 mg = 1 tabs, ORAL, BID  multivitamin 1 tabs, ORAL, DAILY  Norvasc 10 mg oral tablet 5 mg = 0.5 tabs, ORAL, DAILY  Pentasa 500 mg oral capsule, extended release 1,000 mg = 2 caps, ORAL, TID  Ultram 50 mg oral tablet 100 mg = 2 tabs, PRN, ORAL, [id number]  Vitamin D3 25 mcg (1000 intl units) oral capsule 25 mcg = 1 caps, ORAL, DAILY     This patient was seen for my regular monthly visit, nursing evaluations and nursing notes were reviewed, interim events are reviewed, interim concerns and messages were reviewed as we have communicated with nursing staff.  Any issues with the falls, skin care impairment, declining physical condition are reviewed and noted, diagnosis list were reviewed, list of medications were reviewed, living will related issues  were reviewed, overall patient has been doing well, any declining in patient's condition or any change in patient's condition needs to be notified to physician promptly, discussed with nursing staff, if needed would communicate with family.  Patient stays confined here at the facility for long-term care, there are always concerns about long-term care related issues and concerns.  Nursing staff is trying their best to keep patient safe, all sort of measures has been taken to keep patient safe and comfortable.         Source of history: Nurse, Medical personnel, Medical record, Patient.  History limitation: None.     Provide safe environment for the patient    Continue current medication regimen    OT PT and speech therapy    Bowel and bladder,skin care    Nutritional support    Monitor and treat blood glucose    GI  and DVT prophylaxis    PRN medications    Periodic lab work    Regular Follow up      Charting is done using voice recognition software and may contain errors which have not been completely corrected    Histories   Past Medical History: Past Medical HistoryNo qualifying data available.      Family History: Son: Ulcerative colitis..      Procedure history: Colonoscopy.: 2018  Cataract: 2014  History of Biopsy Breast Open  x 2      Social History        Social & Psychosocial History  Social History  Alcohol     Current, Beer, Wine, 3-5 times per week     Exercise  Regular exercise     Other       Comment: G4 SVDx4 (09/23/2020 11:34 - Diane Mullins)     Sexual  No Sexual Activity     Substance Abuse  Denies Substance Abuse     Tobacco  Denies Tobacco Use  Never (less than 100 in lifetime) Tobacco Use:.     Psychosocial History              No active psychosocial history has been recorded  .        Health Status      Allergies (4) Active            Reaction  EPINEPHrine            None Documented  erythromycin           None Documented  sulfa drugs   None Documented  tetanus toxoid          temperature, swelling     Active Problems (46)  Acute deep venous thrombosis of calf..   Anemia   Anemia   Anxiety   Anxiety   Arthritis   Arthritis   At risk for falls   Atypical chest pain   Benign essential hypertension   Blood Products Refused   Blood Products Refused   Cerebrovascular accident   COVID-19   Cystocele   Decreased body mass index   Delay when starting to pass urine   Difficulty walking   Electrocardiogram abnormal   Finding of functional performance and activity   Gastroesophageal reflux disease   Hemiparesis as late effect of cerebrovascular accident   History of anemia   History of chest pain   Hyperlipidemia   Hypertension   Hypertensive disorder   Increased frequency of urination   Iron deficiency anemia   Left lower quadrant pain   Leukocytosis   Loss of hair   Mitral valve regurgitation   Palpitations   Paroxysmal supraventricular tachycardia   Patient encounter status   Seasonal allergic rhinitis   Tick bite   Ulcerative colitis   Ulcerative colitis   Urinary hesitancy   Uterine prolapse   Uterine prolapse   Vaginal bleeding.   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       • calcium carbonate-vitamin D3 (Oyster Shell) 250 mg-3.125 mcg (125 unit) tablet Take by mouth 2 times a day with meals.       • calcium carbonate-vitamin D3 600 mg-5 mcg (200 unit) tablet Take by mouth every 8 hours.       • cholecalciferol (Vitamin D-3) 25 MCG (1000 UT) tablet         • co-enzyme Q-10 50 mg capsule         • [employer] 10 billion cell -200 mg capsule, sprinkle         • Eliquis 2.5 mg tablet         • ferrous sulfate, 325 mg ferrous sulfate, (FerrouSuL) tablet Take 1 tablet by mouth once daily with breakfast. 90 tablet  3   • L.acid,ferm,ricardo,rha-B.bif,long (Controlled Delivery Probiotic) 126 mg (2 billion cell) tablet,delayed and ext.release Take by mouth once daily.       • lidocaine (Lidoderm) 5 % patch Place 1 patch on the skin once daily.       • losartan (Cozaar) 50 mg tablet Take 1 tablet (50 mg) by mouth once daily. 60 tablet 0   • multivitamin tablet Take 1 tablet by mouth once daily.       • multivitamin with minerals (multivit-min-iron fum-folic ac) tablet Take by mouth.       • multivitamin with minerals (multivit-min-iron fum-folic ac) tablet Take 1 tablet by mouth once daily.       • Pentasa 500 mg ER capsule Take 1 capsule (500 mg) by mouth 3 times a day. 3 pills in the morning  2 pills at 1200   3 at 6 pm     Per Dr. Talavera       • pyridoxine (Vitamin B-6) 50 mg tablet TAKE 1 BY MOUTH ONCE A DAY FOR 14 DAYS          No current facility-administered medications for this visit.            Physical Exam:  Vital signs as per nursing/MA documentation were reviewed  General appearance: Alert and in no acute distress  HEENT: Normal Inspection  Neck - Normal Inspection  Respiratory : No respiratory distress. Lungs are clear   Cardiovascular: heart rate normal. No gallop  Back - normal inspection  Skin inspection:Warm  Musculoskeletal : No deformities  Neuro : Limited exam. Baseline     ROS: Review of symptoms is negative except for what is mentioned in HPI     Results/Data  Records including but not limited to electronic medical records, relevant lab work and imaging from patient's health care facility encounter were reviewed and independently verified        Charting was completed using voice recognition technology and may include unintended errors.     Discussed with patient/family, RN, and NP.      Electronically Signed By: Carlo Oh MD   8/8/24  2:01 PM

## 2024-07-26 ENCOUNTER — APPOINTMENT (OUTPATIENT)
Dept: OBSTETRICS AND GYNECOLOGY | Facility: CLINIC | Age: 79
End: 2024-07-26
Payer: MEDICARE

## 2024-07-26 VITALS
WEIGHT: 89.6 LBS | HEIGHT: 60 IN | DIASTOLIC BLOOD PRESSURE: 80 MMHG | SYSTOLIC BLOOD PRESSURE: 168 MMHG | BODY MASS INDEX: 17.59 KG/M2

## 2024-07-26 DIAGNOSIS — N81.10 CYSTOCELE WITH RECTOCELE: Primary | ICD-10-CM

## 2024-07-26 DIAGNOSIS — N81.6 CYSTOCELE WITH RECTOCELE: Primary | ICD-10-CM

## 2024-07-26 PROCEDURE — 99213 OFFICE O/P EST LOW 20 MIN: CPT | Performed by: OBSTETRICS & GYNECOLOGY

## 2024-07-26 NOTE — PROGRESS NOTES
Subjective   Patient ID: Janice Ko is a 78 y.o. female who presents for Pessary Check (Patient here for pessary check- has c/o blood in  urine--pt is on eliquis/Pts adult daughter is in room as chaperone).  HPI  Patient is a 78-year-old white female with known history of cystorectocele with ring with web pessary in place.  Patient states since her last visit she has noticed more vaginal discharge and more bleeding.  Not soaking through pads but notices it several times throughout the day.  Denies any pelvic pain denies any blood in the urine or stool.  Review of Systems    Objective   Physical Exam  Pelvic: External genitalia atrophic, vagina pessary was removed and cleaned.  Speculum placed in the vagina several erosions noted.  Assessment/Plan   Known history of cystorectocele, vaginal erosions, recommend keeping pessary out for 4 to 6 weeks and then replacing.  Patient will take pessary home and bring it back at follow-up appointment.         Marcelo Correa MD 07/26/24 11:07 AM

## 2024-07-31 NOTE — PROGRESS NOTES
Janice Ko   78 y.o.  female  @location@                 Assessment and Plan:  Patient is a long-term resident of [facility] assisted living     77-year-old     Accidental fall  Communicated fracture of distal radius  Ulnar styloid fracture  Left hip intertrochanteric fracture  S/p CVA with baseline weakness  Hypertension  Ulcerative colitis        s/p L hip Cephallomedullary nail for intertrochanteric femur fracture  Pain control  Patient was transfused for low hemoglobin-  H&H is stable since then  Blood pressure is low-hold antihypertensives  Wrist has been splinted  Continue baseline meds  GI due to prophylaxis  OT PT eval after likely surgery     ascorbic acid 500 mg oral tablet 500 mg = 1 tabs, ORAL, DAILY  aspirin 81 mg oral delayed release tablet 81 mg = 1 tabs, ORAL, DAILY  CoQ10 100 mg oral capsule 200 mg = 2 caps, ORAL, DAILY  Crestor 20 mg oral tablet 20 mg = 1 tabs, ORAL, DAILY  ferrous sulfate 325 mg (65 mg elemental iron) oral tablet 325 mg = 1 tabs, ORAL, EVERY OTHER DAY  lactobacillus acidophilus = Floranex, Florajen, Lactinex Granules 1 packets, ORAL, DAILY  losartan 50 mg oral tablet 50 mg = 1 tabs, ORAL, BID  multivitamin 1 tabs, ORAL, DAILY  Norvasc 10 mg oral tablet 5 mg = 0.5 tabs, ORAL, DAILY  Pentasa 500 mg oral capsule, extended release 1,000 mg = 2 caps, ORAL, TID  Ultram 50 mg oral tablet 100 mg = 2 tabs, PRN, ORAL, [id number]  Vitamin D3 25 mcg (1000 intl units) oral capsule 25 mcg = 1 caps, ORAL, DAILY     This patient was seen for my regular monthly visit, nursing evaluations and nursing notes were reviewed, interim events are reviewed, interim concerns and messages were reviewed as we have communicated with nursing staff.  Any issues with the falls, skin care impairment, declining physical condition are reviewed and noted, diagnosis list were reviewed, list of medications were reviewed, living will related issues were reviewed, overall patient has been doing well, any declining in  patient's condition or any change in patient's condition needs to be notified to physician promptly, discussed with nursing staff, if needed would communicate with family.  Patient stays confined here at the facility for long-term care, there are always concerns about long-term care related issues and concerns.  Nursing staff is trying their best to keep patient safe, all sort of measures has been taken to keep patient safe and comfortable.         Source of history: Nurse, Medical personnel, Medical record, Patient.  History limitation: None.     Provide safe environment for the patient    Continue current medication regimen    OT PT and speech therapy    Bowel and bladder,skin care    Nutritional support    Monitor and treat blood glucose    GI  and DVT prophylaxis    PRN medications    Periodic lab work    Regular Follow up      Charting is done using voice recognition software and may contain errors which have not been completely corrected    Histories   Past Medical History: Past Medical HistoryNo qualifying data available.      Family History: Son: Ulcerative colitis..      Procedure history: Colonoscopy.: 2018  Cataract: 2014  History of Biopsy Breast Open  x 2      Social History        Social & Psychosocial History  Social History  Alcohol     Current, Beer, Wine, 3-5 times per week     Exercise  Regular exercise     Other       Comment: G4 SVDx4 (09/23/2020 11:34 - Diane Mullins)     Sexual  No Sexual Activity     Substance Abuse  Denies Substance Abuse     Tobacco  Denies Tobacco Use  Never (less than 100 in lifetime) Tobacco Use:.     Psychosocial History              No active psychosocial history has been recorded  .        Health Status      Allergies (4) Active            Reaction  EPINEPHrine            None Documented  erythromycin           None Documented  sulfa drugs   None Documented  tetanus toxoid         temperature, swelling     Active Problems (46)  Acute deep venous thrombosis  of calf..   Anemia   Anemia   Anxiety   Anxiety   Arthritis   Arthritis   At risk for falls   Atypical chest pain   Benign essential hypertension   Blood Products Refused   Blood Products Refused   Cerebrovascular accident   COVID-19   Cystocele   Decreased body mass index   Delay when starting to pass urine   Difficulty walking   Electrocardiogram abnormal   Finding of functional performance and activity   Gastroesophageal reflux disease   Hemiparesis as late effect of cerebrovascular accident   History of anemia   History of chest pain   Hyperlipidemia   Hypertension   Hypertensive disorder   Increased frequency of urination   Iron deficiency anemia   Left lower quadrant pain   Leukocytosis   Loss of hair   Mitral valve regurgitation   Palpitations   Paroxysmal supraventricular tachycardia   Patient encounter status   Seasonal allergic rhinitis   Tick bite   Ulcerative colitis   Ulcerative colitis   Urinary hesitancy   Uterine prolapse   Uterine prolapse   Vaginal bleeding.   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        calcium carbonate-vitamin D3 (Oyster Shell) 250 mg-3.125 mcg (125 unit) tablet Take by mouth 2 times a day with meals.        calcium carbonate-vitamin D3 600 mg-5 mcg (200 unit) tablet Take by mouth every 8 hours.        cholecalciferol (Vitamin D-3) 25 MCG (1000 UT) tablet          co-enzyme Q-10 50 mg capsule          [facility] 10 billion cell -200 mg capsule, sprinkle          Eliquis 2.5 mg tablet          ferrous sulfate, 325 mg ferrous sulfate, (FerrouSuL) tablet Take 1 tablet by mouth once daily with breakfast. 90 tablet 3    L.acid,ferm,ricardo,rha-B.bif,long (Controlled Delivery Probiotic) 126 mg (2 billion  cell) tablet,delayed and ext.release Take by mouth once daily.        lidocaine (Lidoderm) 5 % patch Place 1 patch on the skin once daily.        losartan (Cozaar) 50 mg tablet Take 1 tablet (50 mg) by mouth once daily. 60 tablet 0    multivitamin tablet Take 1 tablet by mouth once daily.        multivitamin with minerals (multivit-min-iron fum-folic ac) tablet Take by mouth.        multivitamin with minerals (multivit-min-iron fum-folic ac) tablet Take 1 tablet by mouth once daily.        Pentasa 500 mg ER capsule Take 1 capsule (500 mg) by mouth 3 times a day. 3 pills in the morning  2 pills at 1200   3 at 6 pm     Per Dr. Talavera        pyridoxine (Vitamin B-6) 50 mg tablet TAKE 1 BY MOUTH ONCE A DAY FOR 14 DAYS          No current facility-administered medications for this visit.            Physical Exam:  Vital signs as per nursing/MA documentation were reviewed  General appearance: Alert and in no acute distress  HEENT: Normal Inspection  Neck - Normal Inspection  Respiratory : No respiratory distress. Lungs are clear   Cardiovascular: heart rate normal. No gallop  Back - normal inspection  Skin inspection:Warm  Musculoskeletal : No deformities  Neuro : Limited exam. Baseline     ROS: Review of symptoms is negative except for what is mentioned in HPI     Results/Data  Records including but not limited to electronic medical records, relevant lab work and imaging from patient's health care facility encounter were reviewed and independently verified        Charting was completed using voice recognition technology and may include unintended errors.     Discussed with patient/family, RN, and NP.

## 2024-08-05 ENCOUNTER — APPOINTMENT (OUTPATIENT)
Dept: CARDIOLOGY | Facility: CLINIC | Age: 79
End: 2024-08-05
Payer: MEDICARE

## 2024-08-05 ENCOUNTER — HOSPITAL ENCOUNTER (OUTPATIENT)
Dept: CARDIOLOGY | Facility: CLINIC | Age: 79
Discharge: HOME | End: 2024-08-05
Payer: MEDICARE

## 2024-08-05 VITALS
WEIGHT: 90.8 LBS | BODY MASS INDEX: 17.83 KG/M2 | HEIGHT: 60 IN | SYSTOLIC BLOOD PRESSURE: 124 MMHG | DIASTOLIC BLOOD PRESSURE: 68 MMHG | OXYGEN SATURATION: 98 % | HEART RATE: 76 BPM

## 2024-08-05 DIAGNOSIS — G81.94 LEFT HEMIPARESIS (MULTI): ICD-10-CM

## 2024-08-05 DIAGNOSIS — I10 BENIGN ESSENTIAL HYPERTENSION: Primary | ICD-10-CM

## 2024-08-05 DIAGNOSIS — E78.5 HYPERLIPIDEMIA, UNSPECIFIED HYPERLIPIDEMIA TYPE: ICD-10-CM

## 2024-08-05 DIAGNOSIS — Z22.7 TB LUNG, LATENT: ICD-10-CM

## 2024-08-05 DIAGNOSIS — I34.0 NONRHEUMATIC MITRAL VALVE REGURGITATION: ICD-10-CM

## 2024-08-05 DIAGNOSIS — I26.99 PULMONARY THROMBOEMBOLISM (MULTI): ICD-10-CM

## 2024-08-05 LAB
AORTIC VALVE MEAN GRADIENT: 2.2 MMHG
AORTIC VALVE PEAK VELOCITY: 1.08 M/S
AV PEAK GRADIENT: 4.6 MMHG
AVA (PEAK VEL): 3.09 CM2
AVA (VTI): 3.33 CM2
EJECTION FRACTION APICAL 4 CHAMBER: 64.8
EJECTION FRACTION: 68 %
LEFT ATRIUM VOLUME AREA LENGTH INDEX BSA: 30.5 ML/M2
LEFT VENTRICLE INTERNAL DIMENSION DIASTOLE: 3.69 CM (ref 3.5–6)
LEFT VENTRICULAR OUTFLOW TRACT DIAMETER: 2.04 CM
RIGHT VENTRICLE FREE WALL PEAK S': 0.11 CM/S
RIGHT VENTRICLE PEAK SYSTOLIC PRESSURE: 31.2 MMHG
TRICUSPID ANNULAR PLANE SYSTOLIC EXCURSION: 2.2 CM

## 2024-08-05 PROCEDURE — 1159F MED LIST DOCD IN RCRD: CPT | Performed by: INTERNAL MEDICINE

## 2024-08-05 PROCEDURE — 1160F RVW MEDS BY RX/DR IN RCRD: CPT | Performed by: INTERNAL MEDICINE

## 2024-08-05 PROCEDURE — 3078F DIAST BP <80 MM HG: CPT | Performed by: INTERNAL MEDICINE

## 2024-08-05 PROCEDURE — 3074F SYST BP LT 130 MM HG: CPT | Performed by: INTERNAL MEDICINE

## 2024-08-05 PROCEDURE — 93306 TTE W/DOPPLER COMPLETE: CPT

## 2024-08-05 PROCEDURE — 93306 TTE W/DOPPLER COMPLETE: CPT | Performed by: INTERNAL MEDICINE

## 2024-08-05 PROCEDURE — 99215 OFFICE O/P EST HI 40 MIN: CPT | Performed by: INTERNAL MEDICINE

## 2024-08-05 PROCEDURE — 1036F TOBACCO NON-USER: CPT | Performed by: INTERNAL MEDICINE

## 2024-08-05 NOTE — PROGRESS NOTES
Subjective  Janice Ko  is a 78 y.o. year old female who presents for mitral regurgitation.  Notes her left leg numbness since hip surgery.    Blood pressure 124/68, pulse 76, height 1.524 m (5'), weight (!) 41.2 kg (90 lb 12.8 oz), SpO2 98%.   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     Varicose veins of lower extremities with inflammation     Past Surgical History:   Procedure Laterality Date    BREAST BIOPSY  07/09/2018    Biopsy Breast Open    CATARACT EXTRACTION  07/09/2018    Cataract Surgery    MR HEAD ANGIO WO IV CONTRAST  12/27/2022    MR HEAD ANGIO WO IV CONTRAST 12/27/2022 [facility]    MR NECK ANGIO WO IV CONTRAST  12/27/2022    MR NECK ANGIO WO IV CONTRAST 12/27/2022 DOCTOR OFFICE [facility]     Family History   Problem Relation Name Age of Onset    Heart failure Mother      Hypertension Mother      Other (malignant neoplasm of breast) Mother      Other (cva) Father      Hypertension Father      Other (myocardial infarction) Father      Other (stroke-in-evolution syndrome) Father      Multiple sclerosis Sister      Hypertension Brother      Alzheimer's disease Mother's Sister       @SOC    Current Outpatient Medications   Medication Sig Dispense Refill    acetaminophen (Tylenol) 325 mg tablet       aspirin 81 mg EC tablet Take 1 tablet (81 mg) by mouth once daily.      benzocaine-menthol (Cepastat Sore Throat) 15-3.6 mg lozenge Dissolve 1 lozenge in the mouth.      calcium carbonate-vitamin D3 (Oyster Shell) 250 mg-3.125 mcg (125 unit) tablet Take by mouth 2 times a day with meals.      calcium carbonate-vitamin D3 600 mg-5 mcg (200 unit) tablet Take by mouth every 8  hours.      cholecalciferol (Vitamin D-3) 25 MCG (1000 UT) tablet       co-enzyme Q-10 50 mg capsule       [facility] 10 billion cell -200 mg capsule, sprinkle       Eliquis 2.5 mg tablet Take 1 tablet (2.5 mg) by mouth 2 times a day. 180 tablet 0    ferrous sulfate, 325 mg ferrous sulfate, (FerrouSuL) tablet Take 1 tablet by mouth once daily with breakfast. 90 tablet 3    L.acid,ferm,ricardo,rha-B.bif,long (Controlled Delivery Probiotic) 126 mg (2 billion cell) tablet,delayed and ext.release Take by mouth once daily.      lidocaine (Lidoderm) 5 % patch Place 1 patch on the skin once daily.      losartan (Cozaar) 50 mg tablet TAKE 1 TABLET BY MOUTH EVERY DAY 60 tablet 06    multivitamin tablet Take 1 tablet by mouth once daily.      multivitamin with minerals (multivit-min-iron fum-folic ac) tablet Take by mouth.      multivitamin with minerals (multivit-min-iron fum-folic ac) tablet Take 1 tablet by mouth once daily.      Pentasa 500 mg ER capsule Take 1 capsule (500 mg) by mouth 3 times a day. 3 pills in the morning  2 pills at 1200   3 at 6 pm    Per Dr. Talavera      pyridoxine (Vitamin B-6) 50 mg tablet TAKE 1 BY MOUTH ONCE A DAY FOR 14 DAYS       No current facility-administered medications for this visit.        ROS  Review of Systems    Physical Exam  Physical Exam  Constitutional:       Appearance: Normal appearance.   HENT:      Head: Normocephalic and atraumatic.   Cardiovascular:      Rate and Rhythm: Normal rate and regular rhythm.      Comments: II-II/VI HSM to left axilla  Musculoskeletal:      Right lower leg: No edema.      Left lower leg: No edema.   Skin:     General: Skin is warm and dry.   Neurological:      General: No focal deficit present.      Mental Status: She is alert and oriented to person, place, and time.   Psychiatric:         Mood and Affect: Mood normal.         Behavior: Behavior normal.          EKG  Encounter Date: 05/26/24   ECG 12 lead   Result Value    Ventricular  Rate 72    Atrial Rate 73    KY Interval 148    QRS Duration 97    QT Interval 394    QTC Calculation(Bazett) 432    P Axis 73    R Axis 62    T Axis 51    QRS Count 12    Q Onset 249    T Offset 446    QTC Fredericia 418    Narrative    Sinus rhythm  Probable left atrial enlargement  Left ventricular hypertrophy    See ED provider note for full interpretation and clinical correlation  Confirmed by Melani Saenz ([id number]) on 5/29/2024 2:31:45 PM       Problem List Items Addressed This Visit       Benign essential hypertension - Primary    Hyperlipidemia    Relevant Orders    Lipid Panel Non-Fasting    CBC and Auto Differential    Follow Up In Cardiology    Mitral regurgitation     8/5/24 echocardiogram moderate to severe MR, LVEF=60-65% (reveiwed today)         Left hemiparesis (Multi)    TB lung, latent    Pulmonary thromboembolism (Multi)         Return 3 months with EKG      Javier Perez MD

## 2024-08-06 ENCOUNTER — APPOINTMENT (OUTPATIENT)
Dept: CARDIOLOGY | Facility: CLINIC | Age: 79
End: 2024-08-06
Payer: MEDICARE

## 2024-08-19 ENCOUNTER — APPOINTMENT (OUTPATIENT)
Dept: OBSTETRICS AND GYNECOLOGY | Facility: CLINIC | Age: 79
End: 2024-08-19
Payer: MEDICARE

## 2024-08-22 DIAGNOSIS — I82.462 ACUTE DEEP VEIN THROMBOSIS (DVT) OF CALF MUSCLE VEIN OF LEFT LOWER EXTREMITY (MULTI): ICD-10-CM

## 2024-08-22 RX ORDER — APIXABAN 2.5 MG/1
2.5 TABLET, FILM COATED ORAL 2 TIMES DAILY
Qty: 180 TABLET | Refills: 0 | Status: CANCELLED | OUTPATIENT
Start: 2024-08-22 | End: 2024-11-20

## 2024-08-23 ENCOUNTER — TELEPHONE (OUTPATIENT)
Dept: PRIMARY CARE | Facility: CLINIC | Age: 79
End: 2024-08-23
Payer: MEDICARE

## 2024-08-23 NOTE — TELEPHONE ENCOUNTER
Pt called and states that infectious diseases told the pt to contact PCP to be placed fenofibrate due to the pt having TB in lungs. Pt states that she wants to get tested to see if the fenofibrate was the medication that messed up her liver before  wanting to go back on it

## 2024-08-27 ENCOUNTER — HOME VISIT (OUTPATIENT)
Dept: POST ACUTE CARE | Facility: EXTERNAL LOCATION | Age: 79
End: 2024-08-27
Payer: MEDICARE

## 2024-08-27 DIAGNOSIS — F41.8 SITUATIONAL ANXIETY: ICD-10-CM

## 2024-08-27 DIAGNOSIS — M19.90 ARTHRITIS: ICD-10-CM

## 2024-08-27 DIAGNOSIS — R07.89 CHEST PAIN, ATYPICAL: ICD-10-CM

## 2024-08-27 DIAGNOSIS — Z00.00 WELLNESS EXAMINATION: Primary | ICD-10-CM

## 2024-08-27 DIAGNOSIS — I69.354 HEMIPLEGIA AND HEMIPARESIS FOLLOWING CEREBRAL INFARCTION AFFECTING LEFT NON-DOMINANT SIDE (MULTI): ICD-10-CM

## 2024-08-27 PROCEDURE — G0446 INTENS BEHAVE THER CARDIO DX: HCPCS | Performed by: EMERGENCY MEDICINE

## 2024-08-27 PROCEDURE — 99497 ADVNCD CARE PLAN 30 MIN: CPT | Performed by: EMERGENCY MEDICINE

## 2024-08-27 PROCEDURE — 99348 HOME/RES VST EST LOW MDM 30: CPT | Performed by: EMERGENCY MEDICINE

## 2024-08-27 PROCEDURE — G0439 PPPS, SUBSEQ VISIT: HCPCS | Performed by: EMERGENCY MEDICINE

## 2024-08-27 PROCEDURE — G0442 ANNUAL ALCOHOL SCREEN 15 MIN: HCPCS | Performed by: EMERGENCY MEDICINE

## 2024-08-27 PROCEDURE — 99397 PER PM REEVAL EST PAT 65+ YR: CPT | Performed by: EMERGENCY MEDICINE

## 2024-08-29 DIAGNOSIS — R79.89 ELEVATED LFTS: Primary | ICD-10-CM

## 2024-08-29 NOTE — TELEPHONE ENCOUNTER
This was never completed- sent to [facility] on 8/23.     Pt called back today asking about follow up.   Would like lab order for liver enzymes faxed to [facility] facility [phone]..    Do you want CMP or just AST/ALT? Please send back to me so I can make sure it is completed.     Thank you

## 2024-08-29 NOTE — PROGRESS NOTES
Janice Ko   78 y.o.  female  @location@                 Assessment and Plan:  Patient is a long-term resident of [facility]          Accidental fall  Communicated fracture of distal radius  Ulnar styloid fracture  Left hip intertrochanteric fracture  S/p CVA with baseline weakness  Hypertension  Ulcerative colitis        s/p L hip Cephallomedullary nail for intertrochanteric femur fracture  Pain control  Patient was transfused for low hemoglobin-  H&H is stable since then  Blood pressure is low-hold antihypertensives  Wrist has been splinted  Continue baseline meds  GI due to prophylaxis  OT PT eval after likely surgery     ascorbic acid 500 mg oral tablet 500 mg = 1 tabs, ORAL, DAILY  aspirin 81 mg oral delayed release tablet 81 mg = 1 tabs, ORAL, DAILY  CoQ10 100 mg oral capsule 200 mg = 2 caps, ORAL, DAILY  Crestor 20 mg oral tablet 20 mg = 1 tabs, ORAL, DAILY  ferrous sulfate 325 mg (65 mg elemental iron) oral tablet 325 mg = 1 tabs, ORAL, EVERY OTHER DAY  lactobacillus acidophilus = Floranex, Florajen, Lactinex Granules 1 packets, ORAL, DAILY  losartan 50 mg oral tablet 50 mg = 1 tabs, ORAL, BID  multivitamin 1 tabs, ORAL, DAILY  Norvasc 10 mg oral tablet 5 mg = 0.5 tabs, ORAL, DAILY  Pentasa 500 mg oral capsule, extended release 1,000 mg = 2 caps, ORAL, TID  Ultram 50 mg oral tablet 100 mg = 2 tabs, PRN, ORAL, [id number]  Vitamin D3 25 mcg (1000 intl units) oral capsule 25 mcg = 1 caps, ORAL, DAILY     The patient is being seen for the subsequent annual wellness visit.   Past Medical, Surgical and Family History: reviewed and updated in chart.   Medications and Supplements: Review of all medications by a prescribing practitioner or clinical pharmacist (such as prescriptions, OTCs, herbal therapies and supplements) documented in the medical record.   Patient Self Assessment of Health Status: fair.   Tobacco use: Non-User   Alcohol use: Non-User, As noted in social history   Illicit drug use:  Non-User   Current diet: well balanced diet.   Exercise Frequency: infrequently.   Depression/Suicide Screening:  .   During the past 2 weeks, the patient has not felt down, depressed or hopeless.    During the past 2 weeks, the patient has not felt little interest or pleasure in doing things.    Hearing Impairment: Patient has slight hearing impairment.   Cognitive Impairment: Some cognitive impairment observed.      Bathing: needs assistance.   Dressing: needs assistance.   Walking: needs assistance.   Toileting: performs independently.   Feeding: performs independently.   Personal Hygiene: performs independently.   Bowels: continent.   Bladder: continent.   Managing Finances: needs assistance.   Shopping: needs assistance.   Managing Medications: needs assistance.   Housework / Basic Home Maintenance: needs assistance.   Handling Transportation:  needs assistance.   Preparing Meals: Needs assistance  Using the Telephone/ Communication Devices: performs independently.   Patient resides at an assisted living facility   Falls Risk Screening:.  has not fallen   Home safety risk factors: none and Patient resides at an assisted living facility.   Advance directives:. Advanced Care Planning discussed and documented advance care plan or surrogate decision maker documented in the medical record.   A Conversation about Advance directives of at least 16 minutes has occurred.   Topics discussed: The code status is signed and filed with the assisted living facility.      PH Q-9 depression screening was completed  for 5-10 minutes    Alcohol screening was completed for 5 to 10 minutes    We had face-to-face discussion up to 15 minutes with this patient about the cardiovascular risk and behavioral therapies of nutritional choices, exercise and elimination of habits contradicting to the risk. We agreed on how they may be able to reduce their current cardiovascular risk.      1. medications are reviewed      2. Continue with  rehabilitative, supportive, and or restorative care as ordered and as the patient tolerates     3. Laboratory evaluations will be monitored on an ongoing as needed basis     4. Medications have been cross-referenced with the patient's diagnoses list, and medications reductions have been considered and/or implemented.     5. Pharmacy recommendations are addressed on an ongoing as needed basis.     6. Controlled substances have been electronically scripted every 60 days for opiates and others of similar schedule, and every 6 months for sedative/hypnotics and others of similar schedule.     7. Nursing has been queried about any potential adverse events that need to be reported to me.    Salient information and adjustment of care plan pertaining to this individual patient interaction today are the following:      A. We will continue with restorative and supportive care as the patient tolerates    B. Laboratory examinations will continue to be drawn on an ongoing as-needed basis. The patient's weight needs to be monitored and if needed we may need to institute appetite stimulating medication    C. The patient's long term prognosis is guarded    Charting is done using voice recognition software and may contain errors which may not have been completely corrected            Source of history: Nurse, Medical personnel, Medical record, Patient.  History limitation: None.         Histories   Past Medical History: Past Medical HistoryNo qualifying data available.      Family History: Son: Ulcerative colitis..      Procedure history: Colonoscopy.: 2018  Cataract: 2014  History of Biopsy Breast Open  x 2      Social History        Social & Psychosocial History  Social History  Alcohol     Current, Beer, Wine, 3-5 times per week     Exercise  Regular exercise     Other       Comment: G4 SVDx4 (09/23/2020 11:34 - Diane Mullins)     Sexual  No Sexual Activity     Substance Abuse  Denies Substance Abuse     Tobacco  Denies  Tobacco Use  Never (less than 100 in lifetime) Tobacco Use:.     Psychosocial History              No active psychosocial history has been recorded  .        Health Status      Allergies (4) Active            Reaction  EPINEPHrine            None Documented  erythromycin           None Documented  sulfa drugs   None Documented  tetanus toxoid         temperature, swelling     Active Problems (46)  Acute deep venous thrombosis of calf..   Anemia   Anemia   Anxiety   Anxiety   Arthritis   Arthritis   At risk for falls   Atypical chest pain   Benign essential hypertension   Blood Products Refused   Blood Products Refused   Cerebrovascular accident   COVID-19   Cystocele   Decreased body mass index   Delay when starting to pass urine   Difficulty walking   Electrocardiogram abnormal   Finding of functional performance and activity   Gastroesophageal reflux disease   Hemiparesis as late effect of cerebrovascular accident   History of anemia   History of chest pain   Hyperlipidemia   Hypertension   Hypertensive disorder   Increased frequency of urination   Iron deficiency anemia   Left lower quadrant pain   Leukocytosis   Loss of hair   Mitral valve regurgitation   Palpitations   Paroxysmal supraventricular tachycardia   Patient encounter status   Seasonal allergic rhinitis   Tick bite   Ulcerative colitis   Ulcerative colitis   Urinary hesitancy   Uterine prolapse   Uterine prolapse   Vaginal bleeding.   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        calcium carbonate-vitamin D3 (Oyster Shell) 250 mg-3.125 mcg (125 unit) tablet Take by mouth 2 times a day with meals.        calcium carbonate-vitamin D3 600 mg-5 mcg (200 unit) tablet Take by mouth every 8  hours.        cholecalciferol (Vitamin D-3) 25 MCG (1000 UT) tablet          co-enzyme Q-10 50 mg capsule          [facility] 10 billion cell -200 mg capsule, sprinkle          Eliquis 2.5 mg tablet          ferrous sulfate, 325 mg ferrous sulfate, (FerrouSuL) tablet Take 1 tablet by mouth once daily with breakfast. 90 tablet 3    L.acid,ferm,ricardo,rha-B.bif,long (Controlled Delivery Probiotic) 126 mg (2 billion cell) tablet,delayed and ext.release Take by mouth once daily.        lidocaine (Lidoderm) 5 % patch Place 1 patch on the skin once daily.        losartan (Cozaar) 50 mg tablet Take 1 tablet (50 mg) by mouth once daily. 60 tablet 0    multivitamin tablet Take 1 tablet by mouth once daily.        multivitamin with minerals (multivit-min-iron fum-folic ac) tablet Take by mouth.        multivitamin with minerals (multivit-min-iron fum-folic ac) tablet Take 1 tablet by mouth once daily.        Pentasa 500 mg ER capsule Take 1 capsule (500 mg) by mouth 3 times a day. 3 pills in the morning  2 pills at 1200   3 at 6 pm     Per Dr. Talavera        pyridoxine (Vitamin B-6) 50 mg tablet TAKE 1 BY MOUTH ONCE A DAY FOR 14 DAYS          No current facility-administered medications for this visit.            Physical Exam:  Vital signs as per nursing/MA documentation were reviewed  General appearance: Alert and in no acute distress  HEENT: Normal Inspection  Neck - Normal Inspection  Respiratory : No respiratory distress. Lungs are clear   Cardiovascular: heart rate normal. No gallop  Back - normal inspection  Skin inspection:Warm  Musculoskeletal : No deformities  Neuro : Limited exam. Baseline     ROS: Review of symptoms is negative except for what is mentioned in HPI     Results/Data  Records including but not limited to electronic medical records, relevant lab work and imaging from patient's health care facility encounter were reviewed and independently verified        Charting was completed using voice  recognition technology and may include unintended errors.     Discussed with patient/family, RN, and NP.

## 2024-09-06 ENCOUNTER — TELEPHONE (OUTPATIENT)
Dept: OBSTETRICS AND GYNECOLOGY | Facility: CLINIC | Age: 79
End: 2024-09-06
Payer: MEDICARE

## 2024-09-06 NOTE — TELEPHONE ENCOUNTER
Spoke to patient on the phone.  Pessary has been out for several weeks now.  Patient states she is doing fine does not feel as though things are protruding out through the vagina.  We discussed keeping pessary out indefinitely.  Also recommend she follow-up once a year just for evaluation or sooner if needed.  She will keep the pessary and have it available if needed.

## 2024-09-06 NOTE — TELEPHONE ENCOUNTER
Patient called and has an appt on Monday for a pessary check.  She indicates she does not have it it and everything seems to be good.  Everything is staying up there and no discharge.  She wanted to know if she should still keep her appt for Monday.  Please advise.

## 2024-09-09 ENCOUNTER — APPOINTMENT (OUTPATIENT)
Dept: OBSTETRICS AND GYNECOLOGY | Facility: CLINIC | Age: 79
End: 2024-09-09
Payer: MEDICARE

## 2024-09-18 ENCOUNTER — TELEPHONE (OUTPATIENT)
Dept: PRIMARY CARE | Facility: CLINIC | Age: 79
End: 2024-09-18
Payer: MEDICARE

## 2024-09-18 NOTE — TELEPHONE ENCOUNTER
Patient called requesting a prescription for fenofibrate to be sent to be sent to [facility] on file. [facility] [place] wants you to confirm this with Dr. Perez.

## 2024-09-26 ENCOUNTER — NURSING HOME VISIT (OUTPATIENT)
Dept: POST ACUTE CARE | Facility: EXTERNAL LOCATION | Age: 79
End: 2024-09-26
Payer: MEDICARE

## 2024-09-26 DIAGNOSIS — F41.8 SITUATIONAL ANXIETY: ICD-10-CM

## 2024-09-26 DIAGNOSIS — M62.81 MUSCLE WEAKNESS (GENERALIZED): ICD-10-CM

## 2024-09-26 DIAGNOSIS — I63.139 CEREBROVASCULAR ACCIDENT (CVA) DUE TO EMBOLISM OF CAROTID ARTERY, UNSPECIFIED BLOOD VESSEL LATERALITY (MULTI): Primary | ICD-10-CM

## 2024-09-26 DIAGNOSIS — K51.911 ULCERATIVE COLITIS WITH RECTAL BLEEDING, UNSPECIFIED LOCATION (MULTI): ICD-10-CM

## 2024-09-26 PROCEDURE — 99349 HOME/RES VST EST MOD MDM 40: CPT | Performed by: EMERGENCY MEDICINE

## 2024-09-26 NOTE — LETTER
Patient: Janice Ko  : 1945    Encounter Date: 2024    Janice Ko   78 y.o.  female  @location@                 Assessment and Plan:  Patient is a long-term resident of [facility]     77-year-old     Accidental fall  Communicated fracture of distal radius  Ulnar styloid fracture  Left hip intertrochanteric fracture  S/p CVA with baseline weakness  Hypertension  Ulcerative colitis        s/p L hip Cephallomedullary nail for intertrochanteric femur fracture  Pain control  Patient was transfused for low hemoglobin-  H&H is stable since then  Blood pressure is low-hold antihypertensives  Wrist has been splinted  Continue baseline meds  GI due to prophylaxis  OT PT eval after likely surgery     ascorbic acid 500 mg oral tablet 500 mg = 1 tabs, ORAL, DAILY  aspirin 81 mg oral delayed release tablet 81 mg = 1 tabs, ORAL, DAILY  CoQ10 100 mg oral capsule 200 mg = 2 caps, ORAL, DAILY  Crestor 20 mg oral tablet 20 mg = 1 tabs, ORAL, DAILY  ferrous sulfate 325 mg (65 mg elemental iron) oral tablet 325 mg = 1 tabs, ORAL, EVERY OTHER DAY  lactobacillus acidophilus = Floranex, Florajen, Lactinex Granules 1 packets, ORAL, DAILY  losartan 50 mg oral tablet 50 mg = 1 tabs, ORAL, BID  multivitamin 1 tabs, ORAL, DAILY  Norvasc 10 mg oral tablet 5 mg = 0.5 tabs, ORAL, DAILY  Pentasa 500 mg oral capsule, extended release 1,000 mg = 2 caps, ORAL, TID  Ultram 50 mg oral tablet 100 mg = 2 tabs, PRN, ORAL, [id number]  Vitamin D3 25 mcg (1000 intl units) oral capsule 25 mcg = 1 caps, ORAL, DAILY     This patient was seen for my regular monthly visit, nursing evaluations and nursing notes were reviewed, interim events are reviewed, interim concerns and messages were reviewed as we have communicated with nursing staff.  Any issues with the falls, skin care impairment, declining physical condition are reviewed and noted, diagnosis list were reviewed, list of medications were reviewed, living will related issues  were reviewed, overall patient has been doing well, any declining in patient's condition or any change in patient's condition needs to be notified to physician promptly, discussed with nursing staff, if needed would communicate with family.  Patient stays confined here at the facility for long-term care, there are always concerns about long-term care related issues and concerns.  Nursing staff is trying their best to keep patient safe, all sort of measures has been taken to keep patient safe and comfortable.         Source of history: Nurse, Medical personnel, Medical record, Patient.  History limitation: None.     1. medications are reviewed      2. Continue with rehabilitative, supportive, and or restorative care as ordered and as the patient tolerates     3. Laboratory evaluations will be monitored on an ongoing as needed basis     4. Medications have been cross-referenced with the patient's diagnoses list, and medications reductions have been considered and/or implemented.     5. Pharmacy recommendations are addressed on an ongoing as needed basis.     6. Controlled substances have been electronically scripted every 60 days for opiates and others of similar schedule, and every 6 months for sedative/hypnotics and others of similar schedule.     7. Nursing has been queried about any potential adverse events that need to be reported to me.    Salient information and adjustment of care plan pertaining to this individual patient interaction today are the following:      A. We will continue with restorative and supportive care as the patient tolerates    B. Laboratory examinations will continue to be drawn on an ongoing as-needed basis. The patient's weight needs to be monitored and if needed we may need to institute appetite stimulating medication    C. The patient's long term prognosis is guarded    Charting is done using voice recognition software and may contain errors which may not have been completely  corrected      Histories   Past Medical History: Past Medical HistoryNo qualifying data available.      Family History: Son: Ulcerative colitis..      Procedure history: Colonoscopy.: 2018  Cataract: 2014  History of Biopsy Breast Open  x 2      Social History        Social & Psychosocial History  Social History  Alcohol     Current, Beer, Wine, 3-5 times per week     Exercise  Regular exercise     Other       Comment: G4 SVDx4 (09/23/2020 11:34 - Diane Mullins)     Sexual  No Sexual Activity     Substance Abuse  Denies Substance Abuse     Tobacco  Denies Tobacco Use  Never (less than 100 in lifetime) Tobacco Use:.     Psychosocial History              No active psychosocial history has been recorded  .        Health Status      Allergies (4) Active            Reaction  EPINEPHrine            None Documented  erythromycin           None Documented  sulfa drugs   None Documented  tetanus toxoid         temperature, swelling     Active Problems (46)  Acute deep venous thrombosis of calf..   Anemia   Anemia   Anxiety   Anxiety   Arthritis   Arthritis   At risk for falls   Atypical chest pain   Benign essential hypertension   Blood Products Refused   Blood Products Refused   Cerebrovascular accident   COVID-19   Cystocele   Decreased body mass index   Delay when starting to pass urine   Difficulty walking   Electrocardiogram abnormal   Finding of functional performance and activity   Gastroesophageal reflux disease   Hemiparesis as late effect of cerebrovascular accident   History of anemia   History of chest pain   Hyperlipidemia   Hypertension   Hypertensive disorder   Increased frequency of urination   Iron deficiency anemia   Left lower quadrant pain   Leukocytosis   Loss of hair   Mitral valve regurgitation   Palpitations   Paroxysmal supraventricular tachycardia   Patient encounter status   Seasonal allergic rhinitis   Tick bite   Ulcerative colitis   Ulcerative colitis   Urinary hesitancy   Uterine  prolapse   Uterine prolapse   Vaginal bleeding.   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       • calcium carbonate-vitamin D3 (Oyster Shell) 250 mg-3.125 mcg (125 unit) tablet Take by mouth 2 times a day with meals.       • calcium carbonate-vitamin D3 600 mg-5 mcg (200 unit) tablet Take by mouth every 8 hours.       • cholecalciferol (Vitamin D-3) 25 MCG (1000 UT) tablet         • co-enzyme Q-10 50 mg capsule         • [facility] 10 billion cell -200 mg capsule, sprinkle         • Eliquis 2.5 mg tablet         • ferrous sulfate, 325 mg ferrous sulfate, (FerrouSuL) tablet Take 1 tablet by mouth once daily with breakfast. 90 tablet 3   • L.acid,ferm,ricardo,rha-B.bif,long (Controlled Delivery Probiotic) 126 mg (2 billion cell) tablet,delayed and ext.release Take by mouth once daily.       • lidocaine (Lidoderm) 5 % patch Place 1 patch on the skin once daily.       • losartan (Cozaar) 50 mg tablet Take 1 tablet (50 mg) by mouth once daily. 60 tablet 0   • multivitamin tablet Take 1 tablet by mouth once daily.       • multivitamin with minerals (multivit-min-iron fum-folic ac) tablet Take by mouth.       • multivitamin with minerals (multivit-min-iron fum-folic ac) tablet Take 1 tablet by mouth once daily.       • Pentasa 500 mg ER capsule Take 1 capsule (500 mg) by mouth 3 times a day. 3 pills in the morning  2 pills at 1200   3 at 6 pm     Per Dr. Talavera       • pyridoxine (Vitamin B-6) 50 mg tablet TAKE 1 BY MOUTH ONCE A DAY FOR 14 DAYS          No current facility-administered medications for this visit.            Physical Exam:  Vital signs as per nursing/MA documentation were reviewed  General appearance: Alert and in no acute  distress  HEENT: Normal Inspection  Neck - Normal Inspection  Respiratory : No respiratory distress. Lungs are clear   Cardiovascular: heart rate normal. No gallop  Back - normal inspection  Skin inspection:Warm  Musculoskeletal : No deformities  Neuro : Limited exam. Baseline     ROS: Review of symptoms is negative except for what is mentioned in HPI     Results/Data  Records including but not limited to electronic medical records, relevant lab work and imaging from patient's health care facility encounter were reviewed and independently verified        Charting was completed using voice recognition technology and may include unintended errors.     Discussed with patient/family, RN, and NP.      Electronically Signed By: Carlo Oh MD   9/28/24  9:10 AM

## 2024-09-27 NOTE — PROGRESS NOTES
Janice Ko   78 y.o.  female  @location@                 Assessment and Plan:  Patient is a long-term resident of [facility] assisted living     77-year-old     Accidental fall  Communicated fracture of distal radius  Ulnar styloid fracture  Left hip intertrochanteric fracture  S/p CVA with baseline weakness  Hypertension  Ulcerative colitis        s/p L hip Cephallomedullary nail for intertrochanteric femur fracture  Pain control  Patient was transfused for low hemoglobin-  H&H is stable since then  Blood pressure is low-hold antihypertensives  Wrist has been splinted  Continue baseline meds  GI due to prophylaxis  OT PT eval after likely surgery     ascorbic acid 500 mg oral tablet 500 mg = 1 tabs, ORAL, DAILY  aspirin 81 mg oral delayed release tablet 81 mg = 1 tabs, ORAL, DAILY  CoQ10 100 mg oral capsule 200 mg = 2 caps, ORAL, DAILY  Crestor 20 mg oral tablet 20 mg = 1 tabs, ORAL, DAILY  ferrous sulfate 325 mg (65 mg elemental iron) oral tablet 325 mg = 1 tabs, ORAL, EVERY OTHER DAY  lactobacillus acidophilus = Floranex, Florajen, Lactinex Granules 1 packets, ORAL, DAILY  losartan 50 mg oral tablet 50 mg = 1 tabs, ORAL, BID  multivitamin 1 tabs, ORAL, DAILY  Norvasc 10 mg oral tablet 5 mg = 0.5 tabs, ORAL, DAILY  Pentasa 500 mg oral capsule, extended release 1,000 mg = 2 caps, ORAL, TID  Ultram 50 mg oral tablet 100 mg = 2 tabs, PRN, ORAL, [id number]  Vitamin D3 25 mcg (1000 intl units) oral capsule 25 mcg = 1 caps, ORAL, DAILY     This patient was seen for my regular monthly visit, nursing evaluations and nursing notes were reviewed, interim events are reviewed, interim concerns and messages were reviewed as we have communicated with nursing staff.  Any issues with the falls, skin care impairment, declining physical condition are reviewed and noted, diagnosis list were reviewed, list of medications were reviewed, living will related issues were reviewed, overall patient has been doing well, any declining in  patient's condition or any change in patient's condition needs to be notified to physician promptly, discussed with nursing staff, if needed would communicate with family.  Patient stays confined here at the facility for long-term care, there are always concerns about long-term care related issues and concerns.  Nursing staff is trying their best to keep patient safe, all sort of measures has been taken to keep patient safe and comfortable.         Source of history: Nurse, Medical personnel, Medical record, Patient.  History limitation: None.     1. medications are reviewed      2. Continue with rehabilitative, supportive, and or restorative care as ordered and as the patient tolerates     3. Laboratory evaluations will be monitored on an ongoing as needed basis     4. Medications have been cross-referenced with the patient's diagnoses list, and medications reductions have been considered and/or implemented.     5. Pharmacy recommendations are addressed on an ongoing as needed basis.     6. Controlled substances have been electronically scripted every 60 days for opiates and others of similar schedule, and every 6 months for sedative/hypnotics and others of similar schedule.     7. Nursing has been queried about any potential adverse events that need to be reported to me.    Salient information and adjustment of care plan pertaining to this individual patient interaction today are the following:      A. We will continue with restorative and supportive care as the patient tolerates    B. Laboratory examinations will continue to be drawn on an ongoing as-needed basis. The patient's weight needs to be monitored and if needed we may need to institute appetite stimulating medication    C. The patient's long term prognosis is guarded    Charting is done using voice recognition software and may contain errors which may not have been completely corrected      Histories   Past Medical History: Past Medical HistoryNo  qualifying data available.      Family History: Son: Ulcerative colitis..      Procedure history: Colonoscopy.: 2018  Cataract: 2014  History of Biopsy Breast Open  x 2      Social History        Social & Psychosocial History  Social History  Alcohol     Current, Beer, Wine, 3-5 times per week     Exercise  Regular exercise     Other       Comment: G4 SVDx4 (09/23/2020 11:34 - Diane Mullins)     Sexual  No Sexual Activity     Substance Abuse  Denies Substance Abuse     Tobacco  Denies Tobacco Use  Never (less than 100 in lifetime) Tobacco Use:.     Psychosocial History              No active psychosocial history has been recorded  .        Health Status      Allergies (4) Active            Reaction  EPINEPHrine            None Documented  erythromycin           None Documented  sulfa drugs   None Documented  tetanus toxoid         temperature, swelling     Active Problems (46)  Acute deep venous thrombosis of calf..   Anemia   Anemia   Anxiety   Anxiety   Arthritis   Arthritis   At risk for falls   Atypical chest pain   Benign essential hypertension   Blood Products Refused   Blood Products Refused   Cerebrovascular accident   COVID-19   Cystocele   Decreased body mass index   Delay when starting to pass urine   Difficulty walking   Electrocardiogram abnormal   Finding of functional performance and activity   Gastroesophageal reflux disease   Hemiparesis as late effect of cerebrovascular accident   History of anemia   History of chest pain   Hyperlipidemia   Hypertension   Hypertensive disorder   Increased frequency of urination   Iron deficiency anemia   Left lower quadrant pain   Leukocytosis   Loss of hair   Mitral valve regurgitation   Palpitations   Paroxysmal supraventricular tachycardia   Patient encounter status   Seasonal allergic rhinitis   Tick bite   Ulcerative colitis   Ulcerative colitis   Urinary hesitancy   Uterine prolapse   Uterine prolapse   Vaginal bleeding.   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        calcium carbonate-vitamin D3 (Oyster Shell) 250 mg-3.125 mcg (125 unit) tablet Take by mouth 2 times a day with meals.        calcium carbonate-vitamin D3 600 mg-5 mcg (200 unit) tablet Take by mouth every 8 hours.        cholecalciferol (Vitamin D-3) 25 MCG (1000 UT) tablet          co-enzyme Q-10 50 mg capsule          [facility] 10 billion cell -200 mg capsule, sprinkle          Eliquis 2.5 mg tablet          ferrous sulfate, 325 mg ferrous sulfate, (FerrouSuL) tablet Take 1 tablet by mouth once daily with breakfast. 90 tablet 3    L.acid,ferm,ricardo,rha-B.bif,long (Controlled Delivery Probiotic) 126 mg (2 billion cell) tablet,delayed and ext.release Take by mouth once daily.        lidocaine (Lidoderm) 5 % patch Place 1 patch on the skin once daily.        losartan (Cozaar) 50 mg tablet Take 1 tablet (50 mg) by mouth once daily. 60 tablet 0    multivitamin tablet Take 1 tablet by mouth once daily.        multivitamin with minerals (multivit-min-iron fum-folic ac) tablet Take by mouth.        multivitamin with minerals (multivit-min-iron fum-folic ac) tablet Take 1 tablet by mouth once daily.        Pentasa 500 mg ER capsule Take 1 capsule (500 mg) by mouth 3 times a day. 3 pills in the morning  2 pills at 1200   3 at 6 pm     Per Dr. Talavera        pyridoxine (Vitamin B-6) 50 mg tablet TAKE 1 BY MOUTH ONCE A DAY FOR 14 DAYS          No current facility-administered medications for this visit.            Physical Exam:  Vital signs as per nursing/MA documentation were reviewed  General appearance: Alert and in no acute distress  HEENT: Normal Inspection  Neck - Normal Inspection  Respiratory : No respiratory  distress. Lungs are clear   Cardiovascular: heart rate normal. No gallop  Back - normal inspection  Skin inspection:Warm  Musculoskeletal : No deformities  Neuro : Limited exam. Baseline     ROS: Review of symptoms is negative except for what is mentioned in HPI     Results/Data  Records including but not limited to electronic medical records, relevant lab work and imaging from patient's health care facility encounter were reviewed and independently verified        Charting was completed using voice recognition technology and may include unintended errors.     Discussed with patient/family, RN, and NP.

## 2024-10-14 PROBLEM — I65.23 CAROTID STENOSIS, ASYMPTOMATIC, BILATERAL: Status: ACTIVE | Noted: 2024-09-25

## 2024-10-14 PROBLEM — I42.2 HYPERTROPHIC CARDIOMYOPATHY (MULTI): Status: ACTIVE | Noted: 2024-09-25

## 2024-10-29 ENCOUNTER — NURSING HOME VISIT (OUTPATIENT)
Dept: POST ACUTE CARE | Facility: EXTERNAL LOCATION | Age: 79
End: 2024-10-29
Payer: MEDICARE

## 2024-10-29 DIAGNOSIS — E43 SEVERE PROTEIN-CALORIE MALNUTRITION (MULTI): Primary | ICD-10-CM

## 2024-10-29 DIAGNOSIS — F41.8 SITUATIONAL ANXIETY: ICD-10-CM

## 2024-10-29 DIAGNOSIS — I63.139 CEREBROVASCULAR ACCIDENT (CVA) DUE TO EMBOLISM OF CAROTID ARTERY, UNSPECIFIED BLOOD VESSEL LATERALITY (MULTI): ICD-10-CM

## 2024-10-29 DIAGNOSIS — M62.81 MUSCLE WEAKNESS (GENERALIZED): ICD-10-CM

## 2024-10-29 PROCEDURE — 99348 HOME/RES VST EST LOW MDM 30: CPT | Performed by: EMERGENCY MEDICINE

## 2024-11-04 ENCOUNTER — OFFICE VISIT (OUTPATIENT)
Dept: CARDIOLOGY | Facility: CLINIC | Age: 79
End: 2024-11-04
Payer: MEDICARE

## 2024-11-04 VITALS
BODY MASS INDEX: 18.34 KG/M2 | SYSTOLIC BLOOD PRESSURE: 126 MMHG | OXYGEN SATURATION: 95 % | DIASTOLIC BLOOD PRESSURE: 72 MMHG | WEIGHT: 93.4 LBS | HEIGHT: 60 IN | HEART RATE: 80 BPM

## 2024-11-04 DIAGNOSIS — I10 BENIGN ESSENTIAL HYPERTENSION: ICD-10-CM

## 2024-11-04 DIAGNOSIS — I26.99 PULMONARY THROMBOEMBOLISM (MULTI): ICD-10-CM

## 2024-11-04 DIAGNOSIS — J44.9 CHRONIC OBSTRUCTIVE PULMONARY DISEASE, UNSPECIFIED COPD TYPE (MULTI): ICD-10-CM

## 2024-11-04 DIAGNOSIS — E78.5 HYPERLIPIDEMIA, UNSPECIFIED HYPERLIPIDEMIA TYPE: Primary | ICD-10-CM

## 2024-11-04 DIAGNOSIS — I63.139 CEREBROVASCULAR ACCIDENT (CVA) DUE TO EMBOLISM OF CAROTID ARTERY, UNSPECIFIED BLOOD VESSEL LATERALITY (MULTI): ICD-10-CM

## 2024-11-04 DIAGNOSIS — I34.0 NONRHEUMATIC MITRAL VALVE REGURGITATION: ICD-10-CM

## 2024-11-04 LAB
ATRIAL RATE: 68 BPM
P AXIS: 70 DEGREES
P OFFSET: 203 MS
P ONSET: 157 MS
PR INTERVAL: 130 MS
Q ONSET: 222 MS
QRS COUNT: 12 BEATS
QRS DURATION: 76 MS
QT INTERVAL: 382 MS
QTC CALCULATION(BAZETT): 406 MS
QTC FREDERICIA: 398 MS
R AXIS: 61 DEGREES
T AXIS: 63 DEGREES
T OFFSET: 413 MS
VENTRICULAR RATE: 68 BPM

## 2024-11-04 PROCEDURE — 1160F RVW MEDS BY RX/DR IN RCRD: CPT | Performed by: INTERNAL MEDICINE

## 2024-11-04 PROCEDURE — 93010 ELECTROCARDIOGRAM REPORT: CPT | Performed by: INTERNAL MEDICINE

## 2024-11-04 PROCEDURE — 1036F TOBACCO NON-USER: CPT | Performed by: INTERNAL MEDICINE

## 2024-11-04 PROCEDURE — 93005 ELECTROCARDIOGRAM TRACING: CPT | Performed by: INTERNAL MEDICINE

## 2024-11-04 PROCEDURE — 99214 OFFICE O/P EST MOD 30 MIN: CPT | Mod: 25 | Performed by: INTERNAL MEDICINE

## 2024-11-04 PROCEDURE — 3074F SYST BP LT 130 MM HG: CPT | Performed by: INTERNAL MEDICINE

## 2024-11-04 PROCEDURE — 99214 OFFICE O/P EST MOD 30 MIN: CPT | Performed by: INTERNAL MEDICINE

## 2024-11-04 PROCEDURE — 1159F MED LIST DOCD IN RCRD: CPT | Performed by: INTERNAL MEDICINE

## 2024-11-04 PROCEDURE — 3078F DIAST BP <80 MM HG: CPT | Performed by: INTERNAL MEDICINE

## 2024-11-04 RX ORDER — LOSARTAN POTASSIUM 50 MG/1
50 TABLET ORAL DAILY
Qty: 90 TABLET | Refills: 3 | Status: SHIPPED | OUTPATIENT
Start: 2024-11-04

## 2024-11-04 RX ORDER — GEMFIBROZIL 600 MG/1
600 TABLET, FILM COATED ORAL 2 TIMES DAILY
Qty: 180 TABLET | Refills: 3 | OUTPATIENT
Start: 2024-11-04 | End: 2025-11-04

## 2024-11-04 NOTE — PROGRESS NOTES
Subjective  aJnice Ko  is a 78 y.o. year old female who presents for mitral regurgitation.  Concerned about ta,ing statin drugs due to possible hepatic toxicity on and tubercular drugs    Blood pressure 150/80, pulse 80, height 1.524 m (5'), weight (!) 42.4 kg (93 lb 6.4 oz), SpO2 95%.   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     Varicose veins of lower extremities with inflammation     Past Surgical History:   Procedure Laterality Date    BREAST BIOPSY  07/09/2018    Biopsy Breast Open    CATARACT EXTRACTION  07/09/2018    Cataract Surgery    MR HEAD ANGIO WO IV CONTRAST  12/27/2022    MR HEAD ANGIO WO IV CONTRAST 12/27/2022 [facility]    MR NECK ANGIO WO IV CONTRAST  12/27/2022    MR NECK ANGIO WO IV CONTRAST 12/27/2022 [facility]     Family History   Problem Relation Name Age of Onset    Heart failure Mother      Hypertension Mother      Other (malignant neoplasm of breast) Mother      Other (cva) Father      Hypertension Father      Other (myocardial infarction) Father      Other (stroke-in-evolution syndrome) Father      Multiple sclerosis Sister      Hypertension Brother      Alzheimer's disease Mother's Sister       @SOC    Current Outpatient Medications   Medication Sig Dispense Refill    acetaminophen (Tylenol) 325 mg tablet       aspirin 81 mg EC tablet Take 1 tablet (81 mg) by mouth once daily.      benzocaine-menthol (Cepastat Sore Throat) 15-3.6 mg lozenge Dissolve 1 lozenge in the mouth.      calcium carbonate-vitamin D3 (Oyster Shell) 250 mg-3.125 mcg (125 unit) tablet Take by mouth 2 times a day with meals.      calcium carbonate-vitamin D3 600 mg-5  mcg (200 unit) tablet Take by mouth every 8 hours.      cholecalciferol (Vitamin D-3) 25 MCG (1000 UT) tablet       co-enzyme Q-10 50 mg capsule       [facility] 10 billion cell -200 mg capsule, sprinkle       Eliquis 2.5 mg tablet Take 1 tablet (2.5 mg) by mouth 2 times a day. 180 tablet 0    ferrous sulfate, 325 mg ferrous sulfate, (FerrouSuL) tablet Take 1 tablet by mouth once daily with breakfast. 90 tablet 3    L.acid,ferm,ricardo,rha-B.bif,long (Controlled Delivery Probiotic) 126 mg (2 billion cell) tablet,delayed and ext.release Take by mouth once daily.      lidocaine (Lidoderm) 5 % patch Place 1 patch on the skin once daily.      losartan (Cozaar) 50 mg tablet Take 1 tablet (50 mg) by mouth once daily. 90 tablet 3    multivitamin tablet Take 1 tablet by mouth once daily.      multivitamin with minerals (multivit-min-iron fum-folic ac) tablet Take by mouth.      multivitamin with minerals (multivit-min-iron fum-folic ac) tablet Take 1 tablet by mouth once daily.      Pentasa 500 mg ER capsule Take 1 capsule (500 mg) by mouth 3 times a day. 3 pills in the morning  2 pills at 1200   3 at 6 pm    Per Dr. Talavera      pyridoxine (Vitamin B-6) 50 mg tablet TAKE 1 BY MOUTH ONCE A DAY FOR 14 DAYS       No current facility-administered medications for this visit.        ROS  Review of Systems   All other systems reviewed and are negative.      Physical Exam  Physical Exam  Constitutional:       Appearance: Normal appearance.   HENT:      Head: Normocephalic and atraumatic.   Cardiovascular:      Rate and Rhythm: Normal rate and regular rhythm.      Comments: II-III/VI HSM to left axilla  Pulmonary:      Effort: Pulmonary effort is normal.      Breath sounds: Normal breath sounds.   Skin:     General: Skin is warm and dry.   Neurological:      General: No focal deficit present.      Mental Status: She is alert and oriented to person, place, and time.   Psychiatric:         Mood and Affect: Mood normal.           EKG  Encounter Date: 11/04/24   ECG 12 Lead   Result Value    Ventricular Rate 68    Atrial Rate 68    CA Interval 130    QRS Duration 76    QT Interval 382    QTC Calculation(Bazett) 406    P Axis 70    R Axis 61    T Axis 63    QRS Count 12    Q Onset 222    P Onset 157    P Offset 203    T Offset 413    QTC Fredericia 398    Narrative    Sinus rhythm with Premature atrial complexes  Otherwise normal ECG  When compared with ECG of 26-MAY-2024 16:51,  PREVIOUS ECG IS PRESENT       Problem List Items Addressed This Visit       Benign essential hypertension    Relevant Medications    losartan (Cozaar) 50 mg tablet    Cerebrovascular accident (Multi)    Chronic obstructive pulmonary disease (Multi)    Hyperlipidemia - Primary    Relevant Orders    ECG 12 Lead (Completed)    Mitral regurgitation     8/5/24 echocardiogram moderate to severe MR, LVEf = 60-65%         Pulmonary thromboembolism (Multi)       Gemfibozil  Return 3 months with echocardiogram and EKG      Javier Perez MD

## 2024-11-12 DIAGNOSIS — E78.5 HYPERLIPIDEMIA, UNSPECIFIED HYPERLIPIDEMIA TYPE: ICD-10-CM

## 2024-11-12 RX ORDER — FENOFIBRATE 120 MG/1
120 TABLET ORAL DAILY
Qty: 90 TABLET | Refills: 3 | Status: SHIPPED | OUTPATIENT
Start: 2024-11-12

## 2024-11-12 NOTE — TELEPHONE ENCOUNTER
Pt called stating she was started on Gemfibrozil, but would prefer to be started on fenofibrate as previously prescribed. Reviewed with Dr. Perez. Per Dr. Perez: Ok to start fenofibrate 120mg daily.  Order pended.

## 2024-11-21 ENCOUNTER — HOME VISIT (OUTPATIENT)
Dept: POST ACUTE CARE | Facility: EXTERNAL LOCATION | Age: 79
End: 2024-11-21
Payer: MEDICARE

## 2024-11-21 DIAGNOSIS — E43 SEVERE PROTEIN-CALORIE MALNUTRITION (MULTI): ICD-10-CM

## 2024-11-21 DIAGNOSIS — I26.99 PULMONARY THROMBOEMBOLISM (MULTI): ICD-10-CM

## 2024-11-21 DIAGNOSIS — I42.2 HYPERTROPHIC CARDIOMYOPATHY (MULTI): Primary | ICD-10-CM

## 2024-11-21 DIAGNOSIS — I10 BENIGN ESSENTIAL HYPERTENSION: ICD-10-CM

## 2024-11-21 PROCEDURE — 99349 HOME/RES VST EST MOD MDM 40: CPT | Performed by: EMERGENCY MEDICINE

## 2024-11-24 NOTE — PROGRESS NOTES
Janice Ko   78 y.o.  female  @location@                 Assessment and Plan:  Patient is a long-term resident of [facility] living     77-year-old     Accidental fall  Communicated fracture of distal radius  Ulnar styloid fracture  Left hip intertrochanteric fracture  S/p CVA with baseline weakness  Hypertension  Ulcerative colitis        s/p L hip Cephallomedullary nail for intertrochanteric femur fracture  Pain control  Patient was transfused for low hemoglobin-  H&H is stable since then  Blood pressure is low-hold antihypertensives  Wrist has been splinted  Continue baseline meds  GI due to prophylaxis  OT PT eval after likely surgery     ascorbic acid 500 mg oral tablet 500 mg = 1 tabs, ORAL, DAILY  aspirin 81 mg oral delayed release tablet 81 mg = 1 tabs, ORAL, DAILY  CoQ10 100 mg oral capsule 200 mg = 2 caps, ORAL, DAILY  Crestor 20 mg oral tablet 20 mg = 1 tabs, ORAL, DAILY  ferrous sulfate 325 mg (65 mg elemental iron) oral tablet 325 mg = 1 tabs, ORAL, EVERY OTHER DAY  lactobacillus acidophilus = Floranex, Florajen, Lactinex Granules 1 packets, ORAL, DAILY  losartan 50 mg oral tablet 50 mg = 1 tabs, ORAL, BID  multivitamin 1 tabs, ORAL, DAILY  Norvasc 10 mg oral tablet 5 mg = 0.5 tabs, ORAL, DAILY  Pentasa 500 mg oral capsule, extended release 1,000 mg = 2 caps, ORAL, TID  Ultram 50 mg oral tablet 100 mg = 2 tabs, PRN, ORAL, L5MELEX  Vitamin D3 25 mcg (1000 intl units) oral capsule 25 mcg = 1 caps, ORAL, DAILY     -Fall prevention    -Cognitive engagement     -Monitor and treat blood pressure     -Aggressive decubitus ulcer prevention.     -Bowel and bladder care     -Optimal nutrition and supplementation as needed     -GI  and DVT prophylaxis     -Symptom control     -Ambulation as tolerated     -Will follow    Charting is done using voice recognition software and may contain errors which have not been completely corrected    Source of history: Nurse, Medical personnel, Medical record,  Patient.  History limitation: None.     This patient was seen for my regular monthly visit, nursing evaluations and nursing notes were reviewed, interim events are reviewed, interim concerns and messages were reviewed as we have communicated with nursing staff.  Any issues with the falls, skin care impairment, declining physical condition are reviewed and noted, diagnosis list were reviewed, list of medications were reviewed, living will related issues were reviewed, overall patient has been doing well, any declining in patient's condition or any change in patient's condition needs to be notified to physician promptly, discussed with nursing staff, if needed would communicate with family.  Patient stays confined here at the facility for long-term care, there are always concerns about long-term care related issues and concerns.  Nursing staff is trying their best to keep patient safe, all sort of measures has been taken to keep patient safe and comfortable.         Histories   Past Medical History: Past Medical HistoryNo qualifying data available.      Family History: Son: Ulcerative colitis..      Procedure history: Colonoscopy.: 2018  Cataract: 2014  History of Biopsy Breast Open  x 2      Social History        Social & Psychosocial History  Social History  Alcohol     Current, Beer, Wine, 3-5 times per week     Exercise  Regular exercise     Other       Comment: G4 SVDx4 (09/23/2020 11:34 - Diane Mullins)     Sexual  No Sexual Activity     Substance Abuse  Denies Substance Abuse     Tobacco  Denies Tobacco Use  Never (less than 100 in lifetime) Tobacco Use:.     Psychosocial History              No active psychosocial history has been recorded  .        Health Status      Allergies (4) Active            Reaction  EPINEPHrine            None Documented  erythromycin           None Documented  sulfa drugs   None Documented  tetanus toxoid         temperature, swelling     Active Problems (46)  Acute deep  venous thrombosis of calf..   Anemia   Anemia   Anxiety   Anxiety   Arthritis   Arthritis   At risk for falls   Atypical chest pain   Benign essential hypertension   Blood Products Refused   Blood Products Refused   Cerebrovascular accident   COVID-19   Cystocele   Decreased body mass index   Delay when starting to pass urine   Difficulty walking   Electrocardiogram abnormal   Finding of functional performance and activity   Gastroesophageal reflux disease   Hemiparesis as late effect of cerebrovascular accident   History of anemia   History of chest pain   Hyperlipidemia   Hypertension   Hypertensive disorder   Increased frequency of urination   Iron deficiency anemia   Left lower quadrant pain   Leukocytosis   Loss of hair   Mitral valve regurgitation   Palpitations   Paroxysmal supraventricular tachycardia   Patient encounter status   Seasonal allergic rhinitis   Tick bite   Ulcerative colitis   Ulcerative colitis   Urinary hesitancy   Uterine prolapse   Uterine prolapse   Vaginal bleeding.   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        calcium carbonate-vitamin D3 (Oyster Shell) 250 mg-3.125 mcg (125 unit) tablet Take by mouth 2 times a day with meals.        calcium carbonate-vitamin D3 600 mg-5 mcg (200 unit) tablet Take by mouth every 8 hours.        cholecalciferol (Vitamin D-3) 25 MCG (1000 UT) tablet          co-enzyme Q-10 50 mg capsule          [facility] 10 billion cell -200 mg capsule, sprinkle          Eliquis 2.5 mg tablet          ferrous sulfate, 325 mg ferrous sulfate, (FerrouSuL) tablet Take 1 tablet by mouth once daily with breakfast. 90 tablet 3    L.acid,ferm,ricardo,rha-B.bif,long (Controlled Delivery Probiotic)  126 mg (2 billion cell) tablet,delayed and ext.release Take by mouth once daily.        lidocaine (Lidoderm) 5 % patch Place 1 patch on the skin once daily.        losartan (Cozaar) 50 mg tablet Take 1 tablet (50 mg) by mouth once daily. 60 tablet 0    multivitamin tablet Take 1 tablet by mouth once daily.        multivitamin with minerals (multivit-min-iron fum-folic ac) tablet Take by mouth.        multivitamin with minerals (multivit-min-iron fum-folic ac) tablet Take 1 tablet by mouth once daily.        Pentasa 500 mg ER capsule Take 1 capsule (500 mg) by mouth 3 times a day. 3 pills in the morning  2 pills at 1200   3 at 6 pm     Per Dr. Talavera        pyridoxine (Vitamin B-6) 50 mg tablet TAKE 1 BY MOUTH ONCE A DAY FOR 14 DAYS          No current facility-administered medications for this visit.            Physical Exam:  Vital signs as per nursing/MA documentation were reviewed  General appearance: Alert and in no acute distress  HEENT: Normal Inspection  Neck - Normal Inspection  Respiratory : No respiratory distress. Lungs are clear   Cardiovascular: heart rate normal. No gallop  Back - normal inspection  Skin inspection:Warm  Musculoskeletal : No deformities  Neuro : Limited exam. Baseline     ROS: Review of symptoms is negative except for what is mentioned in HPI     Results/Data  Records including but not limited to electronic medical records, relevant lab work and imaging from patient's health care facility encounter were reviewed and independently verified        Charting was completed using voice recognition technology and may include unintended errors.     Discussed with patient/family, RN, and NP.

## 2024-12-13 ENCOUNTER — TELEPHONE (OUTPATIENT)
Dept: PRIMARY CARE | Facility: CLINIC | Age: 79
End: 2024-12-13
Payer: MEDICARE

## 2024-12-13 NOTE — TELEPHONE ENCOUNTER
Pt called she thinks she has a bladder infection she needs order put in so she can go to [facility] because its closer to her she is in a senior living building in [place].Please advise  Janice [phone]

## 2024-12-17 NOTE — TELEPHONE ENCOUNTER
Patient called and wanted to see if she can take the probiotics with her medication that she takes now.Please advise

## 2024-12-17 NOTE — TELEPHONE ENCOUNTER
Pt wants to discuss other medication I did tell her she needs to follow up but she would like to speak to you. She has a hard time getting a ride here.

## 2025-01-13 PROBLEM — Z86.718 PERSONAL HISTORY OF OTHER VENOUS THROMBOSIS AND EMBOLISM: Status: RESOLVED | Noted: 2022-12-30 | Resolved: 2025-01-13

## 2025-01-16 ENCOUNTER — TELEPHONE (OUTPATIENT)
Dept: PRIMARY CARE | Facility: CLINIC | Age: 80
End: 2025-01-16
Payer: MEDICARE

## 2025-01-16 DIAGNOSIS — N39.0 URINARY TRACT INFECTION WITHOUT HEMATURIA, SITE UNSPECIFIED: Primary | ICD-10-CM

## 2025-01-16 RX ORDER — NITROFURANTOIN 25; 75 MG/1; MG/1
100 CAPSULE ORAL 2 TIMES DAILY
Qty: 14 CAPSULE | Refills: 0 | Status: SHIPPED | OUTPATIENT
Start: 2025-01-16 | End: 2025-01-23

## 2025-01-16 NOTE — TELEPHONE ENCOUNTER
Pt is calling requesting urine test c/o uti is also requesting antibotic send to pharm   
How Severe Is This Condition?: mild

## 2025-01-27 ENCOUNTER — HOSPITAL ENCOUNTER (OUTPATIENT)
Dept: CARDIOLOGY | Facility: CLINIC | Age: 80
Discharge: HOME | End: 2025-01-27
Payer: MEDICARE

## 2025-01-27 DIAGNOSIS — Z01.818 ENCOUNTER FOR OTHER PREPROCEDURAL EXAMINATION: ICD-10-CM

## 2025-01-27 DIAGNOSIS — I34.0 NONRHEUMATIC MITRAL VALVE REGURGITATION: ICD-10-CM

## 2025-01-27 LAB
AORTIC VALVE MEAN GRADIENT: 4 MMHG
AORTIC VALVE PEAK VELOCITY: 1.21 M/S
AV PEAK GRADIENT: 6 MMHG
EJECTION FRACTION APICAL 4 CHAMBER: 52.8
EJECTION FRACTION: 58 %
LEFT ATRIUM VOLUME AREA LENGTH INDEX BSA: 18.1 ML/M2
LEFT VENTRICLE INTERNAL DIMENSION DIASTOLE: 3.34 CM (ref 3.5–6)
MITRAL VALVE E/A RATIO: 1
RIGHT VENTRICLE FREE WALL PEAK S': 12 CM/S
RIGHT VENTRICLE PEAK SYSTOLIC PRESSURE: 28.6 MMHG
TRICUSPID ANNULAR PLANE SYSTOLIC EXCURSION: 3.6 CM

## 2025-01-27 PROCEDURE — 93306 TTE W/DOPPLER COMPLETE: CPT | Performed by: INTERNAL MEDICINE

## 2025-01-27 PROCEDURE — 93306 TTE W/DOPPLER COMPLETE: CPT

## 2025-02-03 ENCOUNTER — APPOINTMENT (OUTPATIENT)
Dept: CARDIOLOGY | Facility: CLINIC | Age: 80
End: 2025-02-03
Payer: MEDICARE

## 2025-02-24 ENCOUNTER — OFFICE VISIT (OUTPATIENT)
Dept: CARDIOLOGY | Facility: CLINIC | Age: 80
End: 2025-02-24
Payer: MEDICARE

## 2025-02-24 VITALS
OXYGEN SATURATION: 98 % | DIASTOLIC BLOOD PRESSURE: 80 MMHG | BODY MASS INDEX: 16.88 KG/M2 | WEIGHT: 86 LBS | HEIGHT: 60 IN | HEART RATE: 71 BPM | SYSTOLIC BLOOD PRESSURE: 142 MMHG

## 2025-02-24 DIAGNOSIS — I26.99 PULMONARY THROMBOEMBOLISM (MULTI): ICD-10-CM

## 2025-02-24 DIAGNOSIS — I63.139 CEREBROVASCULAR ACCIDENT (CVA) DUE TO EMBOLISM OF CAROTID ARTERY, UNSPECIFIED BLOOD VESSEL LATERALITY (MULTI): ICD-10-CM

## 2025-02-24 DIAGNOSIS — I10 BENIGN ESSENTIAL HYPERTENSION: ICD-10-CM

## 2025-02-24 DIAGNOSIS — E78.5 HYPERLIPIDEMIA, UNSPECIFIED HYPERLIPIDEMIA TYPE: ICD-10-CM

## 2025-02-24 DIAGNOSIS — I34.0 NONRHEUMATIC MITRAL VALVE REGURGITATION: ICD-10-CM

## 2025-02-24 DIAGNOSIS — J44.9 CHRONIC OBSTRUCTIVE PULMONARY DISEASE, UNSPECIFIED COPD TYPE (MULTI): ICD-10-CM

## 2025-02-24 DIAGNOSIS — R00.2 PALPITATIONS: Primary | ICD-10-CM

## 2025-02-24 LAB
ATRIAL RATE: 71 BPM
P AXIS: 67 DEGREES
P OFFSET: 205 MS
P ONSET: 152 MS
PR INTERVAL: 140 MS
Q ONSET: 222 MS
QRS COUNT: 12 BEATS
QRS DURATION: 78 MS
QT INTERVAL: 378 MS
QTC CALCULATION(BAZETT): 410 MS
QTC FREDERICIA: 400 MS
R AXIS: 49 DEGREES
T AXIS: 50 DEGREES
T OFFSET: 411 MS
VENTRICULAR RATE: 71 BPM

## 2025-02-24 PROCEDURE — 1159F MED LIST DOCD IN RCRD: CPT | Performed by: INTERNAL MEDICINE

## 2025-02-24 PROCEDURE — 3078F DIAST BP <80 MM HG: CPT | Performed by: INTERNAL MEDICINE

## 2025-02-24 PROCEDURE — 3077F SYST BP >= 140 MM HG: CPT | Performed by: INTERNAL MEDICINE

## 2025-02-24 PROCEDURE — 93005 ELECTROCARDIOGRAM TRACING: CPT | Performed by: INTERNAL MEDICINE

## 2025-02-24 PROCEDURE — 99214 OFFICE O/P EST MOD 30 MIN: CPT | Performed by: INTERNAL MEDICINE

## 2025-02-24 PROCEDURE — 1036F TOBACCO NON-USER: CPT | Performed by: INTERNAL MEDICINE

## 2025-02-24 PROCEDURE — 1160F RVW MEDS BY RX/DR IN RCRD: CPT | Performed by: INTERNAL MEDICINE

## 2025-02-24 RX ORDER — LOSARTAN POTASSIUM 50 MG/1
50 TABLET ORAL 2 TIMES DAILY
Qty: 180 TABLET | Refills: 3 | Status: SHIPPED | OUTPATIENT
Start: 2025-02-24 | End: 2026-02-24

## 2025-02-24 NOTE — PROGRESS NOTES
Subjective  Janice Ko  is a 79 y.o. year old female who presents for mitral regurgitation.  Her colitis is acting up.    Blood pressure 142/80, pulse 71, height 1.524 m (5'), weight (!) 39 kg (86 lb), SpO2 98%.   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     Varicose veins of lower extremities with inflammation     Past Surgical History:   Procedure Laterality Date    BREAST BIOPSY  07/09/2018    Biopsy Breast Open    CATARACT EXTRACTION  07/09/2018    Cataract Surgery    MR HEAD ANGIO WO IV CONTRAST  12/27/2022    MR HEAD ANGIO WO IV CONTRAST 12/27/2022 [facility]    MR NECK ANGIO WO IV CONTRAST  12/27/2022    MR NECK ANGIO WO IV CONTRAST 12/27/2022 [facility]     Family History   Problem Relation Name Age of Onset    Heart failure Mother      Hypertension Mother      Other (malignant neoplasm of breast) Mother      Other (cva) Father      Hypertension Father      Other (myocardial infarction) Father      Other (stroke-in-evolution syndrome) Father      Multiple sclerosis Sister      Hypertension Brother      Alzheimer's disease Mother's Sister       @SOC    Current Outpatient Medications   Medication Sig Dispense Refill    acetaminophen (Tylenol) 325 mg tablet       aspirin 81 mg EC tablet Take 1 tablet (81 mg) by mouth once daily.      benzocaine-menthol (Cepastat Sore Throat) 15-3.6 mg lozenge Dissolve 1 lozenge in the mouth.      calcium carbonate-vitamin D3 (Oyster Shell) 250 mg-3.125 mcg (125 unit) tablet Take by mouth 2 times a day with meals.      calcium carbonate-vitamin D3 600 mg-5 mcg (200 unit) tablet Take by mouth every 8 hours.      cholecalciferol  (Vitamin D-3) 25 MCG (1000 UT) tablet       co-enzyme Q-10 50 mg capsule       [facility] 10 billion cell -200 mg capsule, sprinkle       Eliquis 2.5 mg tablet Take 1 tablet (2.5 mg) by mouth 2 times a day. 180 tablet 0    fenofibrate (Fenoglide) 120 mg tablet Take 1 tablet (120 mg) by mouth once daily. 90 tablet 3    L.acid,ferm,ricardo,rha-B.bif,long (Controlled Delivery Probiotic) 126 mg (2 billion cell) tablet,delayed and ext.release Take by mouth once daily.      lidocaine (Lidoderm) 5 % patch Place 1 patch on the skin once daily.      losartan (Cozaar) 50 mg tablet Take 1 tablet (50 mg) by mouth once daily. 90 tablet 3    multivitamin tablet Take 1 tablet by mouth once daily.      multivitamin with minerals (multivit-min-iron fum-folic ac) tablet Take by mouth.      multivitamin with minerals (multivit-min-iron fum-folic ac) tablet Take 1 tablet by mouth once daily.      Pentasa 500 mg ER capsule Take 1 capsule (500 mg) by mouth 3 times a day. 3 pills in the morning  2 pills at 1200   3 at 6 pm    Per Dr. Talavera      pyridoxine (Vitamin B-6) 50 mg tablet TAKE 1 BY MOUTH ONCE A DAY FOR 14 DAYS       No current facility-administered medications for this visit.        ROS  Review of Systems   All other systems reviewed and are negative.      Physical Exam  Physical Exam  Constitutional:       Appearance: Normal appearance.   HENT:      Head: Normocephalic and atraumatic.   Cardiovascular:      Rate and Rhythm: Normal rate.      Comments: II-II/VI HSM to left axilla  Abdominal:      Palpations: Abdomen is soft.   Skin:     General: Skin is warm and dry.   Neurological:      Mental Status: She is alert.   Psychiatric:         Mood and Affect: Mood normal.         Behavior: Behavior normal.          EKG  Encounter Date: 02/24/25   ECG 12 Lead   Result Value    Ventricular Rate 71    Atrial Rate 71    UT Interval 140    QRS Duration 78    QT Interval 378    QTC Calculation(Bazett) 410    P Axis 67    R  Axis 49    T Axis 50    QRS Count 12    Q Onset 222    P Onset 152    P Offset 205    T Offset 411    QTC Fredericia 400    Narrative    Normal sinus rhythm  Normal ECG  When compared with ECG of 04-NOV-2024 10:33,  Premature atrial complexes are no longer Present       Problem List Items Addressed This Visit       Cerebrovascular accident (Multi)    Chronic obstructive pulmonary disease (Multi)    Hyperlipidemia    Relevant Orders    Lipid panel    Comprehensive metabolic panel    Mitral regurgitation     1/27/25 echocardiogram moderate mitral regurgitation, LVEF = 55-60%, Tr with normal RVSP         Palpitations - Primary    Relevant Orders    ECG 12 Lead (Completed)    Pulmonary thromboembolism (Multi)         CMP. Lipid profile  Same meds  Return 3 mnths with EKG    Javier Perez MD

## 2025-02-28 ENCOUNTER — TELEPHONE (OUTPATIENT)
Dept: PRIMARY CARE | Facility: CLINIC | Age: 80
End: 2025-02-28
Payer: MEDICARE

## 2025-02-28 DIAGNOSIS — D64.9 ANEMIA, UNSPECIFIED TYPE: Primary | ICD-10-CM

## 2025-02-28 NOTE — TELEPHONE ENCOUNTER
Patient called requesting an order for blood work for iron and CBC, her energy is low again, get exhausted going to a doctor appointment. Gastro stated to reach out to PCP to get tests ordered.

## 2025-03-03 ENCOUNTER — APPOINTMENT (OUTPATIENT)
Dept: OBSTETRICS AND GYNECOLOGY | Facility: CLINIC | Age: 80
End: 2025-03-03
Payer: MEDICARE

## 2025-03-14 ENCOUNTER — APPOINTMENT (OUTPATIENT)
Dept: OBSTETRICS AND GYNECOLOGY | Facility: CLINIC | Age: 80
End: 2025-03-14
Payer: MEDICARE

## 2025-03-17 ENCOUNTER — APPOINTMENT (OUTPATIENT)
Dept: OBSTETRICS AND GYNECOLOGY | Facility: CLINIC | Age: 80
End: 2025-03-17
Payer: MEDICARE

## 2025-05-02 PROBLEM — K92.1 BLOODY STOOLS: Status: RESOLVED | Noted: 2023-11-19 | Resolved: 2025-05-02

## 2025-05-02 PROBLEM — S22.31XA CLOSED FRACTURE OF ONE RIB OF RIGHT SIDE: Status: RESOLVED | Noted: 2024-04-10 | Resolved: 2025-05-02

## 2025-05-02 PROBLEM — N93.9 VAGINAL BLEEDING: Status: RESOLVED | Noted: 2023-04-03 | Resolved: 2025-05-02

## 2025-05-02 PROBLEM — R19.7 DIARRHEA: Status: RESOLVED | Noted: 2023-12-26 | Resolved: 2025-05-02

## 2025-05-02 PROBLEM — R35.0 URINARY FREQUENCY: Status: RESOLVED | Noted: 2023-04-03 | Resolved: 2025-05-02

## 2025-05-02 PROBLEM — B96.89 ACUTE BACTERIAL SINUSITIS: Status: RESOLVED | Noted: 2023-04-03 | Resolved: 2025-05-02

## 2025-05-02 PROBLEM — I65.23 OCCLUSION AND STENOSIS OF BILATERAL CAROTID ARTERIES: Status: RESOLVED | Noted: 2022-12-30 | Resolved: 2025-05-02

## 2025-05-02 PROBLEM — K51.90 ULCERATIVE COLITIS: Status: RESOLVED | Noted: 2023-04-03 | Resolved: 2025-05-02

## 2025-05-02 PROBLEM — N89.8 DISCHARGE OF VAGINA: Status: RESOLVED | Noted: 2023-04-03 | Resolved: 2025-05-02

## 2025-05-02 PROBLEM — S72.002D CLOSED FRACTURE OF LEFT HIP WITH ROUTINE HEALING: Status: RESOLVED | Noted: 2023-08-31 | Resolved: 2025-05-02

## 2025-05-02 PROBLEM — S52.509D: Status: RESOLVED | Noted: 2023-08-31 | Resolved: 2025-05-02

## 2025-05-02 PROBLEM — R07.0 THROAT PAIN: Status: RESOLVED | Noted: 2023-04-03 | Resolved: 2025-05-02
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
## 2025-05-02 PROBLEM — R19.7 ACUTE DIARRHEA: Status: RESOLVED | Noted: 2023-11-19 | Resolved: 2025-05-02

## 2025-05-02 PROBLEM — U07.1 COVID-19: Status: RESOLVED | Noted: 2023-04-03 | Resolved: 2025-05-02

## 2025-05-02 PROBLEM — J01.90 ACUTE BACTERIAL SINUSITIS: Status: RESOLVED | Noted: 2023-04-03 | Resolved: 2025-05-02
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
## 2025-05-02 PROBLEM — A04.72 C. DIFFICILE COLITIS: Status: RESOLVED | Noted: 2023-12-03 | Resolved: 2025-05-02

## 2025-05-02 PROBLEM — R31.9 HEMATURIA: Status: RESOLVED | Noted: 2023-04-03 | Resolved: 2025-05-02

## 2025-05-02 PROBLEM — R39.11 URINARY HESITANCY: Status: RESOLVED | Noted: 2020-10-09 | Resolved: 2025-05-02

## 2025-05-02 PROBLEM — N81.10 FEMALE CYSTOCELE: Status: RESOLVED | Noted: 2020-08-19 | Resolved: 2025-05-02
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
## 2025-05-19 ENCOUNTER — APPOINTMENT (OUTPATIENT)
Dept: CARDIOLOGY | Facility: CLINIC | Age: 80
End: 2025-05-19
Payer: MEDICARE

## 2025-06-30 ENCOUNTER — OFFICE VISIT (OUTPATIENT)
Dept: CARDIOLOGY | Facility: CLINIC | Age: 80
End: 2025-06-30
Payer: MEDICARE

## 2025-06-30 VITALS
DIASTOLIC BLOOD PRESSURE: 80 MMHG | SYSTOLIC BLOOD PRESSURE: 132 MMHG | WEIGHT: 89 LBS | HEART RATE: 67 BPM | HEIGHT: 60 IN | OXYGEN SATURATION: 97 % | BODY MASS INDEX: 17.47 KG/M2

## 2025-06-30 DIAGNOSIS — I26.99 PULMONARY THROMBOEMBOLISM (MULTI): ICD-10-CM

## 2025-06-30 DIAGNOSIS — Z22.7 TB LUNG, LATENT: ICD-10-CM

## 2025-06-30 DIAGNOSIS — K21.9 GASTROESOPHAGEAL REFLUX DISEASE WITHOUT ESOPHAGITIS: ICD-10-CM

## 2025-06-30 DIAGNOSIS — E78.5 HYPERLIPIDEMIA, UNSPECIFIED HYPERLIPIDEMIA TYPE: ICD-10-CM

## 2025-06-30 DIAGNOSIS — I34.0 NONRHEUMATIC MITRAL VALVE REGURGITATION: Primary | ICD-10-CM

## 2025-06-30 DIAGNOSIS — I10 BENIGN ESSENTIAL HYPERTENSION: ICD-10-CM

## 2025-06-30 DIAGNOSIS — J44.9 CHRONIC OBSTRUCTIVE PULMONARY DISEASE, UNSPECIFIED COPD TYPE (MULTI): ICD-10-CM

## 2025-06-30 DIAGNOSIS — Z86.73 HISTORY OF CEREBROVASCULAR ACCIDENT: ICD-10-CM

## 2025-06-30 DIAGNOSIS — R00.2 PALPITATIONS: ICD-10-CM

## 2025-06-30 DIAGNOSIS — I63.139 CEREBROVASCULAR ACCIDENT (CVA) DUE TO EMBOLISM OF CAROTID ARTERY, UNSPECIFIED BLOOD VESSEL LATERALITY (MULTI): ICD-10-CM

## 2025-06-30 PROCEDURE — 1159F MED LIST DOCD IN RCRD: CPT | Performed by: INTERNAL MEDICINE

## 2025-06-30 PROCEDURE — 99215 OFFICE O/P EST HI 40 MIN: CPT | Performed by: INTERNAL MEDICINE

## 2025-06-30 PROCEDURE — 3078F DIAST BP <80 MM HG: CPT | Performed by: INTERNAL MEDICINE

## 2025-06-30 PROCEDURE — 3075F SYST BP GE 130 - 139MM HG: CPT | Performed by: INTERNAL MEDICINE

## 2025-06-30 PROCEDURE — 1160F RVW MEDS BY RX/DR IN RCRD: CPT | Performed by: INTERNAL MEDICINE

## 2025-06-30 PROCEDURE — 99212 OFFICE O/P EST SF 10 MIN: CPT

## 2025-06-30 PROCEDURE — 1036F TOBACCO NON-USER: CPT | Performed by: INTERNAL MEDICINE

## 2025-06-30 RX ORDER — ROSUVASTATIN CALCIUM 5 MG/1
5 TABLET, COATED ORAL DAILY
COMMUNITY
End: 2025-06-30 | Stop reason: SDUPTHER

## 2025-06-30 RX ORDER — ROSUVASTATIN CALCIUM 5 MG/1
5 TABLET, COATED ORAL
Qty: 36 TABLET | Refills: 3 | Status: SHIPPED | OUTPATIENT
Start: 2025-06-30 | End: 2026-06-30

## 2025-06-30 RX ORDER — METOPROLOL SUCCINATE 25 MG/1
25 TABLET, EXTENDED RELEASE ORAL DAILY
COMMUNITY

## 2025-06-30 NOTE — PROGRESS NOTES
Subjective  Janice Ko  is a 79 y.o. year old female who presents for mitral regurgitation.  Previously on Crestor but caused increased LFT's.  Notes exertional dyspnea    Blood pressure 132/80, pulse 67, height (!) 1.524 m (5'), weight (!) 40.4 kg (89 lb), SpO2 97%.   Epinephrine, Rifampin, Sulfa (sulfonamide antibiotics), and Tetanus immune globulin  Medical History[1]  Surgical History[2]  Family History[3]  @SOC    Current Medications[4]     ROS  Review of Systems   All other systems reviewed and are negative.      Physical Exam  Physical Exam  Constitutional:       Appearance: Normal appearance.   HENT:      Head: Normocephalic and atraumatic.   Cardiovascular:      Rate and Rhythm: Normal rate and regular rhythm.   Pulmonary:      Effort: Pulmonary effort is normal.      Breath sounds: Normal breath sounds.   Abdominal:      General: Abdomen is flat.   Musculoskeletal:      Right lower leg: No edema.      Left lower leg: No edema.   Skin:     General: Skin is warm and dry.   Neurological:      General: No focal deficit present.      Mental Status: She is alert and oriented to person, place, and time.   Psychiatric:         Mood and Affect: Mood normal.         Behavior: Behavior normal.          EKG  Encounter Date: 02/24/25   ECG 12 Lead   Result Value    Ventricular Rate 71    Atrial Rate 71    NC Interval 140    QRS Duration 78    QT Interval 378    QTC Calculation(Bazett) 410    P Axis 67    R Axis 49    T Axis 50    QRS Count 12    Q Onset 222    P Onset 152    P Offset 205    T Offset 411    QTC Fredericia 400    Narrative    Normal sinus rhythm  Normal ECG  When compared with ECG of 04-NOV-2024 10:33,  Premature atrial complexes are no longer Present  Confirmed by Javier Perez ([id number]) on 2/24/2025 3:50:05 PM       Problem List Items Addressed This Visit       Benign essential hypertension    Cerebrovascular accident (Multi)    Chronic obstructive pulmonary disease (Multi)    Hyperlipidemia     6/6/25 Tchol = 230, HDL = 66, LDL = 150, Trig = 82         Relevant Medications    rosuvastatin (Crestor) 5 mg tablet    Mitral regurgitation - Primary    1/27/25 echocardiogram moderate MR, LVEF= 55-60%, TR with normal RVSP         Relevant Medications    metoprolol succinate XL (Toprol-XL) 25 mg 24 hr tablet    Other Relevant Orders    Transthoracic echo (TTE) complete    Palpitations    Gastroesophageal reflux disease    History of cerebrovascular accident    TB lung, latent    Pulmonary thromboembolism (Multi)         Crestor 5 mg three times weekly  Recheck lipid profile with LFT's 6 weeks  Return 3 months with EKG and echocardiogram      Javier Perez MD        [1]   Past Medical History:  Diagnosis Date    Personal history of other diseases of the circulatory system     History of mitral valve prolapse    Personal history of other specified conditions     History of chest pain    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     Varicose veins of lower extremities with inflammation   [2]   Past Surgical History:  Procedure Laterality Date    BREAST BIOPSY  07/09/2018    Biopsy Breast Open    CATARACT EXTRACTION  07/09/2018    Cataract Surgery    MR HEAD ANGIO WO IV CONTRAST  12/27/2022    MR HEAD ANGIO WO IV CONTRAST 12/27/2022 [facility]    MR NECK ANGIO WO IV CONTRAST  12/27/2022    MR NECK ANGIO WO IV CONTRAST 12/27/2022 [facility]   [3]   Family History  Problem Relation Name Age of Onset    Heart failure Mother      Hypertension Mother      Other (malignant neoplasm of breast) Mother      Other (cva) Father      Hypertension Father      Other (myocardial infarction) Father      Other (stroke-in-evolution syndrome) Father      Multiple sclerosis Sister      Hypertension Brother      Alzheimer's disease Mother's Sister     [4]   Current Outpatient Medications   Medication Sig Dispense Refill    acetaminophen (Tylenol) 325 mg tablet       aspirin 81 mg EC tablet Take  1 tablet (81 mg) by mouth once daily.      benzocaine-menthol (Cepastat Sore Throat) 15-3.6 mg lozenge Dissolve 1 lozenge in the mouth.      calcium carbonate-vitamin D3 (Oyster Shell) 250 mg-3.125 mcg (125 unit) tablet Take by mouth 2 times a day with meals.      calcium carbonate-vitamin D3 600 mg-5 mcg (200 unit) tablet Take by mouth every 8 hours.      cholecalciferol (Vitamin D-3) 25 MCG (1000 UT) tablet       co-enzyme Q-10 50 mg capsule       [facility] 10 billion cell -200 mg capsule, sprinkle       fenofibrate (Fenoglide) 120 mg tablet Take 1 tablet (120 mg) by mouth once daily. 90 tablet 3    L.acid,ferm,ricardo,rha-B.bif,long (Controlled Delivery Probiotic) 126 mg (2 billion cell) tablet,delayed and ext.release Take by mouth once daily.      lidocaine (Lidoderm) 5 % patch Place 1 patch on the skin once daily.      losartan (Cozaar) 50 mg tablet Take 1 tablet (50 mg) by mouth 2 times a day. 180 tablet 3    metoprolol succinate XL (Toprol-XL) 25 mg 24 hr tablet Take 1 tablet (25 mg) by mouth once daily.      multivitamin with minerals (multivit-min-iron fum-folic ac) tablet Take 1 tablet by mouth once daily.      Pentasa 500 mg ER capsule Take 1 capsule (500 mg) by mouth 3 times a day. 3 pills in the morning  2 pills at 1200   3 at 6 pm    Per Dr. Talavera      pyridoxine (Vitamin B-6) 50 mg tablet TAKE 1 BY MOUTH ONCE A DAY FOR 14 DAYS      rosuvastatin (Crestor) 5 mg tablet Take 1 tablet (5 mg) by mouth once a day on Monday, Wednesday, and Friday. 36 tablet 3     No current facility-administered medications for this visit.

## 2025-07-01 DIAGNOSIS — E78.5 HYPERLIPIDEMIA, UNSPECIFIED HYPERLIPIDEMIA TYPE: ICD-10-CM

## 2025-09-08 ENCOUNTER — APPOINTMENT (OUTPATIENT)
Dept: CARDIOLOGY | Facility: CLINIC | Age: 80
End: 2025-09-08
Payer: MEDICARE